# Patient Record
Sex: FEMALE | Race: OTHER | Employment: UNEMPLOYED | URBAN - METROPOLITAN AREA
[De-identification: names, ages, dates, MRNs, and addresses within clinical notes are randomized per-mention and may not be internally consistent; named-entity substitution may affect disease eponyms.]

---

## 2018-08-02 ENCOUNTER — APPOINTMENT (OUTPATIENT)
Dept: CT IMAGING | Age: 53
End: 2018-08-02
Payer: MEDICARE

## 2018-08-02 ENCOUNTER — HOSPITAL ENCOUNTER (EMERGENCY)
Age: 53
Discharge: HOME | End: 2018-08-02
Attending: EMERGENCY MEDICINE
Payer: MEDICARE

## 2018-08-02 VITALS
SYSTOLIC BLOOD PRESSURE: 105 MMHG | BODY MASS INDEX: 27.6 KG/M2 | HEIGHT: 62 IN | DIASTOLIC BLOOD PRESSURE: 65 MMHG | RESPIRATION RATE: 20 BRPM | OXYGEN SATURATION: 100 % | WEIGHT: 150 LBS | HEART RATE: 73 BPM | TEMPERATURE: 98.2 F

## 2018-08-02 DIAGNOSIS — S09.90XA INJURY OF HEAD, INITIAL ENCOUNTER: Primary | ICD-10-CM

## 2018-08-02 DIAGNOSIS — S01.511A LIP LACERATION, INITIAL ENCOUNTER: ICD-10-CM

## 2018-08-02 PROCEDURE — 72125 CT NECK SPINE W/O DYE: CPT

## 2018-08-02 PROCEDURE — 12011 RPR F/E/E/N/L/M 2.5 CM/<: CPT

## 2018-08-02 PROCEDURE — 6370000000 HC RX 637 (ALT 250 FOR IP): Performed by: EMERGENCY MEDICINE

## 2018-08-02 PROCEDURE — 70450 CT HEAD/BRAIN W/O DYE: CPT

## 2018-08-02 PROCEDURE — 99284 EMERGENCY DEPT VISIT MOD MDM: CPT

## 2018-08-02 RX ORDER — LIDOCAINE AND PRILOCAINE 25; 25 MG/G; MG/G
CREAM TOPICAL ONCE
Status: COMPLETED | OUTPATIENT
Start: 2018-08-02 | End: 2018-08-02

## 2018-08-02 RX ADMIN — LIDOCAINE AND PRILOCAINE: 25; 25 CREAM TOPICAL at 16:30

## 2018-08-02 ASSESSMENT — ENCOUNTER SYMPTOMS
SHORTNESS OF BREATH: 0
ABDOMINAL PAIN: 0
EYE PAIN: 0
CHEST TIGHTNESS: 0
NAUSEA: 0
SORE THROAT: 0
VOMITING: 0

## 2018-08-02 ASSESSMENT — PAIN DESCRIPTION - PAIN TYPE: TYPE: ACUTE PAIN

## 2018-08-02 ASSESSMENT — PAIN DESCRIPTION - LOCATION: LOCATION: MOUTH;NECK

## 2018-08-02 ASSESSMENT — PAIN SCALES - GENERAL: PAINLEVEL_OUTOF10: 7

## 2018-08-02 NOTE — ED PROVIDER NOTES
[facility] ED  eMERGENCY dEPARTMENT eNCOUnter      Pt Name: Bernadine Jay  MRN: [id number]  [place] 1965  Date of evaluation: 8/2/2018  Provider: Quinn Bullard DO    CHIEF COMPLAINT       Chief Complaint   Patient presents with    Fall     Pt was at workshop when she states she tripped while walking. Pt states she hit her but denies LOC. Laceration noted to top lip. Pt also complaining of neck. HISTORY OF PRESENT ILLNESS   (Location/Symptom, Timing/Onset, Context/Setting, Quality, Duration, Modifying Factors, Severity)  Note limiting factors. Bernadine Jay is a 46 y.o. female who presents to the emergency department . Patient presents from the workshop. She has history of mental retardation and seizure disorder but states that she did not have a seizure. She tripped over her own feet and fell hitting her face. She has a laceration on her top lip. She does have a headache and neck ache. She was not knocked out. She did not injure anything else     HPI    Nursing Notes were reviewed. REVIEW OF SYSTEMS    (2-9 systems for level 4, 10 or more for level 5)     Review of Systems   Constitutional: Negative for activity change, appetite change and fatigue. HENT: Negative for congestion and sore throat. Eyes: Negative for pain and visual disturbance. Respiratory: Negative for chest tightness and shortness of breath. Cardiovascular: Negative for chest pain. Gastrointestinal: Negative for abdominal pain, nausea and vomiting. Endocrine: Negative for polydipsia. Genitourinary: Negative for flank pain and urgency. Musculoskeletal: Positive for neck pain. Negative for gait problem and neck stiffness. Skin: Positive for wound. Negative for rash. Neurological: Positive for headaches. Negative for dizziness, tremors, seizures, syncope, facial asymmetry, speech difficulty, weakness, light-headedness and numbness.    Psychiatric/Behavioral: Negative for confusion and distress. HENT:   Head: Normocephalic and atraumatic. Right Ear: External ear normal.   Left Ear: External ear normal.   Mouth/Throat: No oropharyngeal exudate. 2 cm open wound upper lip on the mucosa. No active bleeding   Eyes: Conjunctivae are normal. Pupils are equal, round, and reactive to light. Neck: Normal range of motion. Neck supple. No JVD present. No tracheal deviation present. No thyromegaly present. Cardiovascular: Normal rate and normal heart sounds. No murmur heard. Pulmonary/Chest: Effort normal and breath sounds normal. No respiratory distress. She has no wheezes. Abdominal: Soft. Bowel sounds are normal. There is no tenderness. There is no guarding. Musculoskeletal: Normal range of motion. She exhibits no edema. Neck is tender to palpation over the spinous processes. She is in a hard cervical collar   Neurological: She is alert and oriented to person, place, and time. No cranial nerve deficit. Skin: Skin is warm and dry. No rash noted. She is not diaphoretic. Psychiatric: She has a normal mood and affect. Her behavior is normal.       DIAGNOSTIC RESULTS     EKG: All EKG's are interpreted by the Emergency Department Physician who either signs or Co-signs this chart in the absence of a cardiologist.        RADIOLOGY:   Non-plain film images such as CT, Ultrasound and MRI are read by the radiologist. Plain radiographic images are visualized and preliminarily interpreted by the emergency physician with the below findings:    CT of the cervical spine is negative for fracture.   CT of the brain is negative for bleeding    Interpretation per the Radiologist below, if available at the time of this note:    CT Head WO Contrast    (Results Pending)   CT CERVICAL SPINE WO CONTRAST    (Results Pending)         ED BEDSIDE ULTRASOUND:   Performed by ED Physician - none    LABS:  Labs Reviewed - No data to display    All other labs were within normal range or not returned as of this

## 2018-08-02 NOTE — ED NOTES
Pt and sister provided with discharge instructions, also educated verbally. Pt and sister both verbalize understanding & deny any questions.      Marisela Carlson RN  08/02/18 5548

## 2018-08-05 ENCOUNTER — HOSPITAL ENCOUNTER (EMERGENCY)
Age: 53
Discharge: HOME | End: 2018-08-05
Attending: FAMILY MEDICINE
Payer: MEDICARE

## 2018-08-05 ENCOUNTER — APPOINTMENT (OUTPATIENT)
Dept: CT IMAGING | Age: 53
End: 2018-08-05
Payer: MEDICARE

## 2018-08-05 VITALS
DIASTOLIC BLOOD PRESSURE: 67 MMHG | TEMPERATURE: 98.2 F | HEART RATE: 69 BPM | WEIGHT: 150 LBS | HEIGHT: 62 IN | OXYGEN SATURATION: 98 % | BODY MASS INDEX: 27.6 KG/M2 | RESPIRATION RATE: 16 BRPM | SYSTOLIC BLOOD PRESSURE: 110 MMHG

## 2018-08-05 DIAGNOSIS — R41.0 CONFUSION: Primary | ICD-10-CM
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
## 2018-08-05 LAB
ALBUMIN SERPL-MCNC: 4 G/DL (ref 3.9–4.9)
ALP BLD-CCNC: 104 U/L (ref 40–130)
ALT SERPL-CCNC: 12 U/L (ref 0–33)
ANION GAP SERPL CALCULATED.3IONS-SCNC: 12 MEQ/L (ref 7–13)
AST SERPL-CCNC: 12 U/L (ref 0–35)
BACTERIA: NORMAL /HPF
BASOPHILS ABSOLUTE: 0 K/UL (ref 0–0.2)
BASOPHILS RELATIVE PERCENT: 0.9 %
BILIRUB SERPL-MCNC: <0.2 MG/DL (ref 0–1.2)
BILIRUBIN URINE: NEGATIVE
BLOOD, URINE: NEGATIVE
BUN BLDV-MCNC: 9 MG/DL (ref 6–20)
CALCIUM SERPL-MCNC: 8.7 MG/DL (ref 8.6–10.2)
CHLORIDE BLD-SCNC: 104 MEQ/L (ref 98–107)
CLARITY: CLEAR
CO2: 22 MEQ/L (ref 22–29)
COLOR: ABNORMAL
CREAT SERPL-MCNC: 0.67 MG/DL (ref 0.5–0.9)
EOSINOPHILS ABSOLUTE: 0 K/UL (ref 0–0.7)
EOSINOPHILS RELATIVE PERCENT: 1 %
EPITHELIAL CELLS, UA: NORMAL /HPF
GFR AFRICAN AMERICAN: >60
GFR NON-AFRICAN AMERICAN: >60
GLOBULIN: 2.7 G/DL (ref 2.3–3.5)
GLUCOSE BLD-MCNC: 90 MG/DL (ref 74–109)
GLUCOSE URINE: NEGATIVE MG/DL
HCT VFR BLD CALC: 33 % (ref 37–47)
HEMOGLOBIN: 11.1 G/DL (ref 12–16)
KETONES, URINE: NEGATIVE MG/DL
LEUKOCYTE ESTERASE, URINE: ABNORMAL
LYMPHOCYTES ABSOLUTE: 1.1 K/UL (ref 1–4.8)
LYMPHOCYTES RELATIVE PERCENT: 28.4 %
MCH RBC QN AUTO: 32.6 PG (ref 27–31.3)
MCHC RBC AUTO-ENTMCNC: 33.6 % (ref 33–37)
MCV RBC AUTO: 96.8 FL (ref 82–100)
MONOCYTES ABSOLUTE: 0.3 K/UL (ref 0.2–0.8)
MONOCYTES RELATIVE PERCENT: 8 %
NEUTROPHILS ABSOLUTE: 2.4 K/UL (ref 1.4–6.5)
NEUTROPHILS RELATIVE PERCENT: 61.7 %
NITRITE, URINE: NEGATIVE
PDW BLD-RTO: 13.5 % (ref 11.5–14.5)
PH UA: 6.5 (ref 5–9)
PLATELET # BLD: 234 K/UL (ref 130–400)
POTASSIUM REFLEX MAGNESIUM: 3.7 MEQ/L (ref 3.5–5.1)
PROTEIN UA: NEGATIVE MG/DL
RBC # BLD: 3.41 M/UL (ref 4.2–5.4)
RBC UA: NORMAL /HPF (ref 0–2)
SLIDE REVIEW: ABNORMAL
SODIUM BLD-SCNC: 138 MEQ/L (ref 132–144)
SPECIFIC GRAVITY UA: 1.02 (ref 1–1.03)
TOTAL PROTEIN: 6.7 G/DL (ref 6.4–8.1)
UROBILINOGEN, URINE: 0.2 E.U./DL
WBC # BLD: 3.9 K/UL (ref 4.8–10.8)
WBC UA: NORMAL /HPF (ref 0–5)

## 2018-08-05 PROCEDURE — 81001 URINALYSIS AUTO W/SCOPE: CPT

## 2018-08-05 PROCEDURE — 36415 COLL VENOUS BLD VENIPUNCTURE: CPT

## 2018-08-05 PROCEDURE — 85025 COMPLETE CBC W/AUTO DIFF WBC: CPT

## 2018-08-05 PROCEDURE — 70450 CT HEAD/BRAIN W/O DYE: CPT

## 2018-08-05 PROCEDURE — 87086 URINE CULTURE/COLONY COUNT: CPT

## 2018-08-05 PROCEDURE — 80053 COMPREHEN METABOLIC PANEL: CPT

## 2018-08-05 PROCEDURE — 99285 EMERGENCY DEPT VISIT HI MDM: CPT

## 2018-08-05 ASSESSMENT — ENCOUNTER SYMPTOMS
RESPIRATORY NEGATIVE: 1
GASTROINTESTINAL NEGATIVE: 1
EYES NEGATIVE: 1
ALLERGIC/IMMUNOLOGIC NEGATIVE: 1

## 2018-08-05 NOTE — ED PROVIDER NOTES
Past Medical History:   Diagnosis Date    Mental retardation, mild (I.Q. 50-70)     Seizures ([address]          SURGICAL HISTORY       Past Surgical History:   Procedure Laterality Date    ORTHOPEDIC SURGERY Bilateral     ankle    TUBAL LIGATION           CURRENT MEDICATIONS       Previous Medications    ACETAMINOPHEN (TYLENOL) 325 MG TABLET    Take 2 tablets by mouth every 6 hours as needed for Pain or Fever    CARBAMAZEPINE (TEGRETOL XR) 400 MG EXTENDED RELEASE TABLET    Take 400 mg by mouth 2 times daily    CLOBAZAM (ONFI PO)    Take 10 mg by mouth every 12 hours    LIDOCAINE (LIDODERM) 5 %    Place 2 patches onto the skin daily 12 hours on, 12 hours off. MELOXICAM (MOBIC) 7.5 MG TABLET    Take 1 tablet by mouth 2 times daily (with meals)    RISPERIDONE (RISPERDAL) 3 MG TABLET    Take 3 mg by mouth 2 times daily    TOPIRAMATE (TOPAMAX) 200 MG TABLET    Take 200 mg by mouth 2 times daily       ALLERGIES     Patient has no known allergies. FAMILY HISTORY     History reviewed. No pertinent family history. SOCIAL HISTORY       Social History     Social History    Marital status: Single     Spouse name: N/A    Number of children: N/A    Years of education: N/A     Social History Main Topics    Smoking status: Never Smoker    Smokeless tobacco: Never Used    Alcohol use No    Drug use: No    Sexual activity: Not Asked     Other Topics Concern    None     Social History Narrative    None       SCREENINGS             PHYSICAL EXAM    (up to 7 for level 4, 8 or more for level 5)     ED Triage Vitals [08/05/18 1552]   BP Temp Temp Source Pulse Resp SpO2 Height Weight   110/67 98.2 °F (36.8 °C) Oral 69 16 98 % 5' 2\" (1.575 m) 150 lb (68 kg)       Physical Exam   Constitutional: She is oriented to person, place, and time. She appears well-developed and well-nourished. HENT:   Head: Normocephalic and atraumatic.    Right Ear: External ear normal.   Left Ear: External ear normal.   Nose: Nose normal. Mouth/Throat: Oropharynx is clear and moist.   Eyes: Conjunctivae and EOM are normal. Pupils are equal, round, and reactive to light. Neck: Normal range of motion. Neck supple. Cardiovascular: Normal rate, regular rhythm, normal heart sounds and intact distal pulses. Pulmonary/Chest: Breath sounds normal. No respiratory distress. She has no wheezes. She has no rales. She exhibits no tenderness. Abdominal: Soft. Bowel sounds are normal.   Musculoskeletal: Normal range of motion. Neurological: She is alert and oriented to person, place, and time. She has normal reflexes. She displays normal reflexes. No cranial nerve deficit. She exhibits normal muscle tone. Coordination normal.   Skin: Skin is warm and dry. Psychiatric: She has a normal mood and affect. Her behavior is normal. Judgment and thought content normal.   Nursing note and vitals reviewed. DIAGNOSTIC RESULTS     EKG: All EKG's are interpreted by the Emergency Department Physician who either signs or Co-signs this chart in the absence of a cardiologist.        RADIOLOGY:   Non-plain film images such as CT, Ultrasound and MRI are read by the radiologist. Plain radiographic images are visualized and preliminarily interpreted by the emergency physician with the below findings:        Interpretation per the Radiologist below, if available at the time of this note:    CT head without contrast   Final Result      NO ACUTE INTRACRANIAL PROCESS NOR CHANGE FROM LAST EXAM.      MILD MUCOSAL THICKENING ETHMOID SINUSES. All CT scans at this facility use dose modulation, iterative reconstruction, and/or weight based dosing when appropriate to reduce radiation dose to as low as reasonably achievable.                ED BEDSIDE ULTRASOUND:   Performed by ED Physician - none    LABS:  Labs Reviewed   CBC WITH AUTO DIFFERENTIAL - Abnormal; Notable for the following:        Result Value    WBC 3.9 (*)     RBC 3.41 (*)     Hemoglobin 11.1 (*)

## 2018-08-07 LAB — URINE CULTURE, ROUTINE: NORMAL

## 2019-01-11 ENCOUNTER — APPOINTMENT (OUTPATIENT)
Dept: CT IMAGING | Age: 54
End: 2019-01-11
Payer: MEDICARE

## 2019-01-11 ENCOUNTER — APPOINTMENT (OUTPATIENT)
Dept: GENERAL RADIOLOGY | Age: 54
End: 2019-01-11
Payer: MEDICARE

## 2019-01-11 ENCOUNTER — HOSPITAL ENCOUNTER (OUTPATIENT)
Age: 54
Setting detail: OBSERVATION
Discharge: HOME | End: 2019-01-14
Attending: INTERNAL MEDICINE | Admitting: INTERNAL MEDICINE
Payer: MEDICARE

## 2019-01-11 ENCOUNTER — APPOINTMENT (OUTPATIENT)
Dept: MRI IMAGING | Age: 54
End: 2019-01-11
Payer: MEDICARE

## 2019-01-11 ENCOUNTER — APPOINTMENT (OUTPATIENT)
Dept: ULTRASOUND IMAGING | Age: 54
End: 2019-01-11
Payer: MEDICARE

## 2019-01-11 DIAGNOSIS — R47.81 SLURRED SPEECH: ICD-10-CM

## 2019-01-11 DIAGNOSIS — G45.9 TIA (TRANSIENT ISCHEMIC ATTACK): Primary | ICD-10-CM

## 2019-01-11 PROBLEM — R29.90 STROKE-LIKE SYMPTOM: Status: ACTIVE | Noted: 2019-01-11

## 2019-01-11 LAB
ALBUMIN SERPL-MCNC: 4.2 G/DL (ref 3.9–4.9)
ALP BLD-CCNC: 128 U/L (ref 40–130)
ALT SERPL-CCNC: 10 U/L (ref 0–33)
ANION GAP SERPL CALCULATED.3IONS-SCNC: 12 MEQ/L (ref 7–13)
APTT: 25.8 SEC (ref 21.6–35.4)
AST SERPL-CCNC: 12 U/L (ref 0–35)
BASOPHILS ABSOLUTE: 0 K/UL (ref 0–0.2)
BASOPHILS RELATIVE PERCENT: 1 %
BILIRUB SERPL-MCNC: <0.2 MG/DL (ref 0–1.2)
BILIRUBIN URINE: NEGATIVE
BLOOD, URINE: NEGATIVE
BUN BLDV-MCNC: 9 MG/DL (ref 6–20)
CALCIUM SERPL-MCNC: 8.4 MG/DL (ref 8.6–10.2)
CARBAMAZEPINE LEVEL: 14.7 UG/ML (ref 4–10)
CHLORIDE BLD-SCNC: 101 MEQ/L (ref 98–107)
CLARITY: ABNORMAL
CO2: 22 MEQ/L (ref 22–29)
COLOR: ABNORMAL
CREAT SERPL-MCNC: 0.66 MG/DL (ref 0.5–0.9)
EKG ATRIAL RATE: 54 BPM
EKG P AXIS: 49 DEGREES
EKG P-R INTERVAL: 178 MS
EKG Q-T INTERVAL: 426 MS
EKG QRS DURATION: 104 MS
EKG QTC CALCULATION (BAZETT): 403 MS
EKG R AXIS: 68 DEGREES
EKG T AXIS: 46 DEGREES
EKG VENTRICULAR RATE: 54 BPM
EOSINOPHILS ABSOLUTE: 0.1 K/UL (ref 0–0.7)
EOSINOPHILS RELATIVE PERCENT: 1.9 %
FOLATE: 8.2 NG/ML (ref 7.3–26.1)
GFR AFRICAN AMERICAN: >60
GFR NON-AFRICAN AMERICAN: >60
GLOBULIN: 3 G/DL (ref 2.3–3.5)
GLUCOSE BLD-MCNC: 97 MG/DL (ref 74–109)
GLUCOSE URINE: NEGATIVE MG/DL
HCT VFR BLD CALC: 36.4 % (ref 37–47)
HEMOGLOBIN: 12.5 G/DL (ref 12–16)
INR BLD: 1
KETONES, URINE: NEGATIVE MG/DL
LEUKOCYTE ESTERASE, URINE: NEGATIVE
LYMPHOCYTES ABSOLUTE: 1 K/UL (ref 1–4.8)
LYMPHOCYTES RELATIVE PERCENT: 31.6 %
MCH RBC QN AUTO: 33.1 PG (ref 27–31.3)
MCHC RBC AUTO-ENTMCNC: 34.3 % (ref 33–37)
MCV RBC AUTO: 96.7 FL (ref 82–100)
MONOCYTES ABSOLUTE: 0.3 K/UL (ref 0.2–0.8)
MONOCYTES RELATIVE PERCENT: 9.3 %
NEUTROPHILS ABSOLUTE: 1.8 K/UL (ref 1.4–6.5)
NEUTROPHILS RELATIVE PERCENT: 56.2 %
NITRITE, URINE: NEGATIVE
PDW BLD-RTO: 13.8 % (ref 11.5–14.5)
PH UA: 8 (ref 5–9)
PLATELET # BLD: 253 K/UL (ref 130–400)
POTASSIUM SERPL-SCNC: 3.7 MEQ/L (ref 3.5–5.1)
PROTEIN UA: NEGATIVE MG/DL
PROTHROMBIN TIME: 10.2 SEC (ref 9–11.5)
RBC # BLD: 3.77 M/UL (ref 4.2–5.4)
SLIDE REVIEW: ABNORMAL
SODIUM BLD-SCNC: 135 MEQ/L (ref 132–144)
SPECIFIC GRAVITY UA: 1.02 (ref 1–1.03)
TOTAL CK: 80 U/L (ref 0–170)
TOTAL PROTEIN: 7.2 G/DL (ref 6.4–8.1)
TROPONIN: <0.01 NG/ML (ref 0–0.01)
TSH SERPL DL<=0.05 MIU/L-ACNC: 6.58 UIU/ML (ref 0.27–4.2)
URINE REFLEX TO CULTURE: ABNORMAL
UROBILINOGEN, URINE: 0.2 E.U./DL
VITAMIN D 25-HYDROXY: 9.2 NG/ML (ref 30–100)
WBC # BLD: 3.1 K/UL (ref 4.8–10.8)

## 2019-01-11 PROCEDURE — G0378 HOSPITAL OBSERVATION PER HR: HCPCS

## 2019-01-11 PROCEDURE — 80053 COMPREHEN METABOLIC PANEL: CPT

## 2019-01-11 PROCEDURE — 84443 ASSAY THYROID STIM HORMONE: CPT

## 2019-01-11 PROCEDURE — 96372 THER/PROPH/DIAG INJ SC/IM: CPT

## 2019-01-11 PROCEDURE — 71045 X-RAY EXAM CHEST 1 VIEW: CPT

## 2019-01-11 PROCEDURE — 80156 ASSAY CARBAMAZEPINE TOTAL: CPT

## 2019-01-11 PROCEDURE — 70544 MR ANGIOGRAPHY HEAD W/O DYE: CPT

## 2019-01-11 PROCEDURE — 84484 ASSAY OF TROPONIN QUANT: CPT

## 2019-01-11 PROCEDURE — 99285 EMERGENCY DEPT VISIT HI MDM: CPT

## 2019-01-11 PROCEDURE — 85025 COMPLETE CBC W/AUTO DIFF WBC: CPT

## 2019-01-11 PROCEDURE — 6370000000 HC RX 637 (ALT 250 FOR IP): Performed by: PHYSICIAN ASSISTANT

## 2019-01-11 PROCEDURE — 6360000002 HC RX W HCPCS: Performed by: NURSE PRACTITIONER

## 2019-01-11 PROCEDURE — 81003 URINALYSIS AUTO W/O SCOPE: CPT

## 2019-01-11 PROCEDURE — 85730 THROMBOPLASTIN TIME PARTIAL: CPT

## 2019-01-11 PROCEDURE — 82746 ASSAY OF FOLIC ACID SERUM: CPT

## 2019-01-11 PROCEDURE — 70551 MRI BRAIN STEM W/O DYE: CPT

## 2019-01-11 PROCEDURE — 93880 EXTRACRANIAL BILAT STUDY: CPT

## 2019-01-11 PROCEDURE — 70450 CT HEAD/BRAIN W/O DYE: CPT

## 2019-01-11 PROCEDURE — 85610 PROTHROMBIN TIME: CPT

## 2019-01-11 PROCEDURE — 82550 ASSAY OF CK (CPK): CPT

## 2019-01-11 PROCEDURE — 36415 COLL VENOUS BLD VENIPUNCTURE: CPT

## 2019-01-11 PROCEDURE — 93010 ELECTROCARDIOGRAM REPORT: CPT | Performed by: INTERNAL MEDICINE

## 2019-01-11 PROCEDURE — 93005 ELECTROCARDIOGRAM TRACING: CPT

## 2019-01-11 PROCEDURE — 82306 VITAMIN D 25 HYDROXY: CPT

## 2019-01-11 PROCEDURE — 6370000000 HC RX 637 (ALT 250 FOR IP): Performed by: INTERNAL MEDICINE

## 2019-01-11 RX ORDER — CLOBAZAM 10 MG/1
10 TABLET ORAL EVERY 12 HOURS
Status: DISCONTINUED | OUTPATIENT
Start: 2019-01-11 | End: 2019-01-14 | Stop reason: HOSPADM

## 2019-01-11 RX ORDER — SIMVASTATIN 20 MG
20 TABLET ORAL NIGHTLY
Status: DISCONTINUED | OUTPATIENT
Start: 2019-01-11 | End: 2019-01-11

## 2019-01-11 RX ORDER — LORAZEPAM 2 MG/ML
2 INJECTION INTRAMUSCULAR EVERY 10 MIN PRN
Status: DISCONTINUED | OUTPATIENT
Start: 2019-01-11 | End: 2019-01-14 | Stop reason: HOSPADM

## 2019-01-11 RX ORDER — SODIUM CHLORIDE 0.9 % (FLUSH) 0.9 %
10 SYRINGE (ML) INJECTION PRN
Status: DISCONTINUED | OUTPATIENT
Start: 2019-01-11 | End: 2019-01-14 | Stop reason: HOSPADM

## 2019-01-11 RX ORDER — ASPIRIN 81 MG/1
324 TABLET, CHEWABLE ORAL ONCE
Status: COMPLETED | OUTPATIENT
Start: 2019-01-11 | End: 2019-01-11

## 2019-01-11 RX ORDER — ASPIRIN 81 MG/1
81 TABLET ORAL DAILY
Status: DISCONTINUED | OUTPATIENT
Start: 2019-01-11 | End: 2019-01-14 | Stop reason: HOSPADM

## 2019-01-11 RX ORDER — RISPERIDONE 3 MG/1
3 TABLET, FILM COATED ORAL 2 TIMES DAILY
Status: DISCONTINUED | OUTPATIENT
Start: 2019-01-11 | End: 2019-01-14 | Stop reason: HOSPADM

## 2019-01-11 RX ORDER — CARBAMAZEPINE 100 MG/1
400 TABLET, EXTENDED RELEASE ORAL 2 TIMES DAILY
Status: DISCONTINUED | OUTPATIENT
Start: 2019-01-11 | End: 2019-01-13

## 2019-01-11 RX ORDER — SODIUM CHLORIDE 0.9 % (FLUSH) 0.9 %
10 SYRINGE (ML) INJECTION EVERY 12 HOURS SCHEDULED
Status: DISCONTINUED | OUTPATIENT
Start: 2019-01-11 | End: 2019-01-14 | Stop reason: HOSPADM

## 2019-01-11 RX ORDER — ACETAMINOPHEN 325 MG/1
650 TABLET ORAL EVERY 6 HOURS PRN
Status: DISCONTINUED | OUTPATIENT
Start: 2019-01-11 | End: 2019-01-14 | Stop reason: HOSPADM

## 2019-01-11 RX ORDER — ONDANSETRON 2 MG/ML
4 INJECTION INTRAMUSCULAR; INTRAVENOUS EVERY 6 HOURS PRN
Status: DISCONTINUED | OUTPATIENT
Start: 2019-01-11 | End: 2019-01-14 | Stop reason: HOSPADM

## 2019-01-11 RX ADMIN — ASPIRIN 81 MG 324 MG: 81 TABLET ORAL at 09:25

## 2019-01-11 RX ADMIN — CLOBAZAM 10 MG: 10 TABLET ORAL at 22:35

## 2019-01-11 RX ADMIN — RISPERIDONE 3 MG: 3 TABLET ORAL at 15:12

## 2019-01-11 RX ADMIN — RISPERIDONE 3 MG: 3 TABLET ORAL at 22:35

## 2019-01-11 RX ADMIN — ENOXAPARIN SODIUM 40 MG: 40 INJECTION SUBCUTANEOUS at 15:14

## 2019-01-11 RX ADMIN — TOPIRAMATE 200 MG: 200 TABLET, FILM COATED ORAL at 22:35

## 2019-01-11 ASSESSMENT — ENCOUNTER SYMPTOMS
EYE DISCHARGE: 0
SHORTNESS OF BREATH: 0
COLOR CHANGE: 0
ABDOMINAL DISTENTION: 0
CONSTIPATION: 0
ABDOMINAL PAIN: 0
RHINORRHEA: 0
SORE THROAT: 0

## 2019-01-12 LAB
CARBAMAZEPINE LEVEL: 5.7 UG/ML (ref 4–10)
CHOLESTEROL, TOTAL: 262 MG/DL (ref 0–199)
GLUCOSE BLD-MCNC: 107 MG/DL (ref 60–115)
HDLC SERPL-MCNC: 112 MG/DL (ref 40–59)
LDL CHOLESTEROL CALCULATED: 120 MG/DL (ref 0–129)
LV EF: 55 %
LVEF MODALITY: NORMAL
PERFORMED ON: NORMAL
T3 FREE: 2.5 PG/ML (ref 2–4.4)
T4 FREE: 0.55 NG/DL (ref 0.93–1.7)
TRIGL SERPL-MCNC: 152 MG/DL (ref 0–200)

## 2019-01-12 PROCEDURE — 93306 TTE W/DOPPLER COMPLETE: CPT

## 2019-01-12 PROCEDURE — 84439 ASSAY OF FREE THYROXINE: CPT

## 2019-01-12 PROCEDURE — 80061 LIPID PANEL: CPT

## 2019-01-12 PROCEDURE — 6370000000 HC RX 637 (ALT 250 FOR IP): Performed by: NURSE PRACTITIONER

## 2019-01-12 PROCEDURE — 6370000000 HC RX 637 (ALT 250 FOR IP): Performed by: SPECIALIST

## 2019-01-12 PROCEDURE — G0378 HOSPITAL OBSERVATION PER HR: HCPCS

## 2019-01-12 PROCEDURE — G8978 MOBILITY CURRENT STATUS: HCPCS

## 2019-01-12 PROCEDURE — 80156 ASSAY CARBAMAZEPINE TOTAL: CPT

## 2019-01-12 PROCEDURE — 36415 COLL VENOUS BLD VENIPUNCTURE: CPT

## 2019-01-12 PROCEDURE — 84481 FREE ASSAY (FT-3): CPT

## 2019-01-12 PROCEDURE — 96372 THER/PROPH/DIAG INJ SC/IM: CPT

## 2019-01-12 PROCEDURE — 6360000002 HC RX W HCPCS: Performed by: NURSE PRACTITIONER

## 2019-01-12 PROCEDURE — 97162 PT EVAL MOD COMPLEX 30 MIN: CPT

## 2019-01-12 PROCEDURE — G8979 MOBILITY GOAL STATUS: HCPCS

## 2019-01-12 PROCEDURE — 2580000003 HC RX 258: Performed by: NURSE PRACTITIONER

## 2019-01-12 PROCEDURE — 6370000000 HC RX 637 (ALT 250 FOR IP): Performed by: INTERNAL MEDICINE

## 2019-01-12 RX ORDER — ASPIRIN 81 MG/1
81 TABLET ORAL DAILY
Qty: 30 TABLET | Refills: 3 | Status: SHIPPED | OUTPATIENT
Start: 2019-01-12 | End: 2019-01-28

## 2019-01-12 RX ORDER — ATORVASTATIN CALCIUM 40 MG/1
40 TABLET, FILM COATED ORAL NIGHTLY
Status: DISCONTINUED | OUTPATIENT
Start: 2019-01-12 | End: 2019-01-14 | Stop reason: HOSPADM

## 2019-01-12 RX ORDER — ATORVASTATIN CALCIUM 40 MG/1
40 TABLET, FILM COATED ORAL NIGHTLY
Qty: 30 TABLET | Refills: 3 | Status: SHIPPED | OUTPATIENT
Start: 2019-01-12 | End: 2019-01-28

## 2019-01-12 RX ADMIN — Medication 10 ML: at 22:11

## 2019-01-12 RX ADMIN — Medication 10 ML: at 10:18

## 2019-01-12 RX ADMIN — CLOBAZAM 10 MG: 10 TABLET ORAL at 14:48

## 2019-01-12 RX ADMIN — RISPERIDONE 3 MG: 3 TABLET ORAL at 22:11

## 2019-01-12 RX ADMIN — ACETAMINOPHEN 650 MG: 325 TABLET ORAL at 23:38

## 2019-01-12 RX ADMIN — TOPIRAMATE 200 MG: 200 TABLET, FILM COATED ORAL at 22:11

## 2019-01-12 RX ADMIN — RISPERIDONE 3 MG: 3 TABLET ORAL at 10:17

## 2019-01-12 RX ADMIN — ENOXAPARIN SODIUM 40 MG: 40 INJECTION SUBCUTANEOUS at 10:18

## 2019-01-12 RX ADMIN — ATORVASTATIN CALCIUM 40 MG: 40 TABLET, FILM COATED ORAL at 22:11

## 2019-01-12 RX ADMIN — Medication 5000 UNITS: at 22:11

## 2019-01-12 RX ADMIN — ASPIRIN 81 MG: 81 TABLET, COATED ORAL at 10:17

## 2019-01-12 RX ADMIN — TOPIRAMATE 200 MG: 200 TABLET, FILM COATED ORAL at 10:17

## 2019-01-12 ASSESSMENT — PAIN SCALES - GENERAL
PAINLEVEL_OUTOF10: 5
PAINLEVEL_OUTOF10: 0

## 2019-01-13 PROCEDURE — 2580000003 HC RX 258: Performed by: NURSE PRACTITIONER

## 2019-01-13 PROCEDURE — G0378 HOSPITAL OBSERVATION PER HR: HCPCS

## 2019-01-13 PROCEDURE — 90686 IIV4 VACC NO PRSV 0.5 ML IM: CPT | Performed by: INTERNAL MEDICINE

## 2019-01-13 PROCEDURE — 6360000002 HC RX W HCPCS: Performed by: NURSE PRACTITIONER

## 2019-01-13 PROCEDURE — 6370000000 HC RX 637 (ALT 250 FOR IP): Performed by: SPECIALIST

## 2019-01-13 PROCEDURE — G0008 ADMIN INFLUENZA VIRUS VAC: HCPCS | Performed by: INTERNAL MEDICINE

## 2019-01-13 PROCEDURE — 6370000000 HC RX 637 (ALT 250 FOR IP): Performed by: INTERNAL MEDICINE

## 2019-01-13 PROCEDURE — 96372 THER/PROPH/DIAG INJ SC/IM: CPT

## 2019-01-13 PROCEDURE — 6360000002 HC RX W HCPCS: Performed by: INTERNAL MEDICINE

## 2019-01-13 PROCEDURE — 6370000000 HC RX 637 (ALT 250 FOR IP): Performed by: NURSE PRACTITIONER

## 2019-01-13 RX ADMIN — ENOXAPARIN SODIUM 40 MG: 40 INJECTION SUBCUTANEOUS at 08:50

## 2019-01-13 RX ADMIN — ACETAMINOPHEN 650 MG: 325 TABLET ORAL at 05:04

## 2019-01-13 RX ADMIN — RISPERIDONE 3 MG: 3 TABLET ORAL at 20:56

## 2019-01-13 RX ADMIN — ATORVASTATIN CALCIUM 40 MG: 40 TABLET, FILM COATED ORAL at 20:56

## 2019-01-13 RX ADMIN — CLOBAZAM 10 MG: 10 TABLET ORAL at 21:02

## 2019-01-13 RX ADMIN — Medication 10 ML: at 08:52

## 2019-01-13 RX ADMIN — TOPIRAMATE 200 MG: 200 TABLET, FILM COATED ORAL at 20:56

## 2019-01-13 RX ADMIN — TOPIRAMATE 200 MG: 200 TABLET, FILM COATED ORAL at 08:50

## 2019-01-13 RX ADMIN — RISPERIDONE 3 MG: 3 TABLET ORAL at 08:50

## 2019-01-13 RX ADMIN — ACETAMINOPHEN 650 MG: 325 TABLET ORAL at 20:56

## 2019-01-13 RX ADMIN — Medication 10 ML: at 20:58

## 2019-01-13 RX ADMIN — ACETAMINOPHEN 650 MG: 325 TABLET ORAL at 13:35

## 2019-01-13 RX ADMIN — CLOBAZAM 10 MG: 10 TABLET ORAL at 05:18

## 2019-01-13 RX ADMIN — Medication 5000 UNITS: at 08:50

## 2019-01-13 RX ADMIN — INFLUENZA A VIRUS A/MICHIGAN/45/2015 X-275 (H1N1) ANTIGEN (FORMALDEHYDE INACTIVATED), INFLUENZA A VIRUS A/SINGAPORE/INFIMH-16-0019/2016 IVR-186 (H3N2) ANTIGEN (FORMALDEHYDE INACTIVATED), INFLUENZA B VIRUS B/PHUKET/3073/2013 ANTIGEN (FORMALDEHYDE INACTIVATED), AND INFLUENZA B VIRUS B/MARYLAND/15/2016 BX-69A ANTIGEN (FORMALDEHYDE INACTIVATED) 0.5 ML: 15; 15; 15; 15 INJECTION, SUSPENSION INTRAMUSCULAR at 08:50

## 2019-01-13 RX ADMIN — ASPIRIN 81 MG: 81 TABLET, COATED ORAL at 08:50

## 2019-01-13 ASSESSMENT — PAIN DESCRIPTION - LOCATION: LOCATION: HEAD

## 2019-01-13 ASSESSMENT — PAIN SCALES - GENERAL
PAINLEVEL_OUTOF10: 10
PAINLEVEL_OUTOF10: 6
PAINLEVEL_OUTOF10: 8

## 2019-01-14 VITALS
WEIGHT: 152 LBS | OXYGEN SATURATION: 97 % | HEART RATE: 88 BPM | BODY MASS INDEX: 27.97 KG/M2 | RESPIRATION RATE: 18 BRPM | SYSTOLIC BLOOD PRESSURE: 133 MMHG | TEMPERATURE: 99.1 F | HEIGHT: 62 IN | DIASTOLIC BLOOD PRESSURE: 73 MMHG

## 2019-01-14 PROCEDURE — 6370000000 HC RX 637 (ALT 250 FOR IP): Performed by: INTERNAL MEDICINE

## 2019-01-14 PROCEDURE — G0378 HOSPITAL OBSERVATION PER HR: HCPCS

## 2019-01-14 PROCEDURE — 6360000002 HC RX W HCPCS: Performed by: NURSE PRACTITIONER

## 2019-01-14 PROCEDURE — 97116 GAIT TRAINING THERAPY: CPT

## 2019-01-14 PROCEDURE — 96372 THER/PROPH/DIAG INJ SC/IM: CPT

## 2019-01-14 PROCEDURE — 6370000000 HC RX 637 (ALT 250 FOR IP): Performed by: SPECIALIST

## 2019-01-14 PROCEDURE — 6370000000 HC RX 637 (ALT 250 FOR IP): Performed by: NURSE PRACTITIONER

## 2019-01-14 RX ADMIN — ASPIRIN 81 MG: 81 TABLET, COATED ORAL at 08:12

## 2019-01-14 RX ADMIN — ACETAMINOPHEN 650 MG: 325 TABLET ORAL at 04:05

## 2019-01-14 RX ADMIN — Medication 5000 UNITS: at 08:12

## 2019-01-14 RX ADMIN — CLOBAZAM 10 MG: 10 TABLET ORAL at 07:09

## 2019-01-14 RX ADMIN — TOPIRAMATE 200 MG: 200 TABLET, FILM COATED ORAL at 08:12

## 2019-01-14 RX ADMIN — ENOXAPARIN SODIUM 40 MG: 40 INJECTION SUBCUTANEOUS at 08:13

## 2019-01-14 RX ADMIN — ACETAMINOPHEN 650 MG: 325 TABLET ORAL at 12:26

## 2019-01-14 RX ADMIN — RISPERIDONE 3 MG: 3 TABLET ORAL at 08:12

## 2019-01-14 ASSESSMENT — PAIN DESCRIPTION - LOCATION: LOCATION: HEAD

## 2019-01-14 ASSESSMENT — PAIN SCALES - GENERAL
PAINLEVEL_OUTOF10: 5
PAINLEVEL_OUTOF10: 0
PAINLEVEL_OUTOF10: 7
PAINLEVEL_OUTOF10: 6

## 2019-01-28 ENCOUNTER — OFFICE VISIT (OUTPATIENT)
Dept: FAMILY MEDICINE CLINIC | Age: 54
End: 2019-01-28
Payer: MEDICARE

## 2019-01-28 VITALS
TEMPERATURE: 98.1 F | HEART RATE: 77 BPM | OXYGEN SATURATION: 99 % | DIASTOLIC BLOOD PRESSURE: 62 MMHG | BODY MASS INDEX: 27.42 KG/M2 | HEIGHT: 62 IN | WEIGHT: 149 LBS | RESPIRATION RATE: 14 BRPM | SYSTOLIC BLOOD PRESSURE: 118 MMHG

## 2019-01-28 DIAGNOSIS — Z12.11 COLON CANCER SCREENING: ICD-10-CM

## 2019-01-28 DIAGNOSIS — E55.9 VITAMIN D DEFICIENCY: Chronic | ICD-10-CM

## 2019-01-28 DIAGNOSIS — R29.90 STROKE-LIKE SYMPTOM: Primary | ICD-10-CM

## 2019-01-28 DIAGNOSIS — Q07.9 CONGENITAL ENCEPHALOPATHY (HCC): ICD-10-CM

## 2019-01-28 DIAGNOSIS — Z12.39 SCREENING FOR BREAST CANCER: ICD-10-CM

## 2019-01-28 DIAGNOSIS — E03.9 HYPOTHYROIDISM, UNSPECIFIED TYPE: ICD-10-CM

## 2019-01-28 DIAGNOSIS — G40.909 SEIZURE DISORDER (HCC): ICD-10-CM

## 2019-01-28 PROBLEM — R79.89 ABNORMAL THYROID BLOOD TEST: Chronic | Status: ACTIVE | Noted: 2019-01-28

## 2019-01-28 PROCEDURE — 1036F TOBACCO NON-USER: CPT | Performed by: FAMILY MEDICINE

## 2019-01-28 PROCEDURE — 3017F COLORECTAL CA SCREEN DOC REV: CPT | Performed by: FAMILY MEDICINE

## 2019-01-28 PROCEDURE — 99204 OFFICE O/P NEW MOD 45 MIN: CPT | Performed by: FAMILY MEDICINE

## 2019-01-28 PROCEDURE — G8427 DOCREV CUR MEDS BY ELIG CLIN: HCPCS | Performed by: FAMILY MEDICINE

## 2019-01-28 PROCEDURE — G8482 FLU IMMUNIZE ORDER/ADMIN: HCPCS | Performed by: FAMILY MEDICINE

## 2019-01-28 PROCEDURE — G8419 CALC BMI OUT NRM PARAM NOF/U: HCPCS | Performed by: FAMILY MEDICINE

## 2019-01-28 RX ORDER — LANOLIN ALCOHOL/MO/W.PET/CERES
3 CREAM (GRAM) TOPICAL DAILY
COMMUNITY
End: 2019-11-25 | Stop reason: DRUGHIGH

## 2019-01-28 RX ORDER — CLOBAZAM 10 MG/1
TABLET ORAL
Refills: 5 | COMMUNITY
Start: 2018-12-30

## 2019-01-28 ASSESSMENT — PATIENT HEALTH QUESTIONNAIRE - PHQ9
2. FEELING DOWN, DEPRESSED OR HOPELESS: 0
SUM OF ALL RESPONSES TO PHQ QUESTIONS 1-9: 0
SUM OF ALL RESPONSES TO PHQ9 QUESTIONS 1 & 2: 0
SUM OF ALL RESPONSES TO PHQ QUESTIONS 1-9: 0
1. LITTLE INTEREST OR PLEASURE IN DOING THINGS: 0

## 2019-05-03 ENCOUNTER — HOSPITAL ENCOUNTER (EMERGENCY)
Age: 54
Discharge: HOME | End: 2019-05-03
Attending: EMERGENCY MEDICINE
Payer: MEDICARE

## 2019-05-03 ENCOUNTER — APPOINTMENT (OUTPATIENT)
Dept: GENERAL RADIOLOGY | Age: 54
End: 2019-05-03
Payer: MEDICARE

## 2019-05-03 ENCOUNTER — APPOINTMENT (OUTPATIENT)
Dept: CT IMAGING | Age: 54
End: 2019-05-03
Payer: MEDICARE

## 2019-05-03 VITALS
RESPIRATION RATE: 18 BRPM | TEMPERATURE: 98.8 F | DIASTOLIC BLOOD PRESSURE: 55 MMHG | WEIGHT: 150 LBS | SYSTOLIC BLOOD PRESSURE: 107 MMHG | BODY MASS INDEX: 27.44 KG/M2 | HEART RATE: 66 BPM | OXYGEN SATURATION: 100 %
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
## 2019-05-03 DIAGNOSIS — S09.90XA CLOSED HEAD INJURY, INITIAL ENCOUNTER: Primary | ICD-10-CM

## 2019-05-03 PROCEDURE — 72220 X-RAY EXAM SACRUM TAILBONE: CPT

## 2019-05-03 PROCEDURE — 72110 X-RAY EXAM L-2 SPINE 4/>VWS: CPT

## 2019-05-03 PROCEDURE — 99283 EMERGENCY DEPT VISIT LOW MDM: CPT

## 2019-05-03 PROCEDURE — 70450 CT HEAD/BRAIN W/O DYE: CPT

## 2019-05-03 ASSESSMENT — ENCOUNTER SYMPTOMS
DIARRHEA: 0
RHINORRHEA: 0
COUGH: 0
ABDOMINAL PAIN: 0
ABDOMINAL DISTENTION: 0
EYE PAIN: 0
COLOR CHANGE: 0
BACK PAIN: 0
CONSTIPATION: 0
NAUSEA: 0
SHORTNESS OF BREATH: 0
APNEA: 0
SORE THROAT: 0
SINUS PRESSURE: 0
VOMITING: 0
WHEEZING: 0
PHOTOPHOBIA: 0

## 2019-05-03 NOTE — ED NOTES
Patient resting in bed with caregiver at bedside. Patient denies any complaints of pain. Call light in reach. Bed pads intact.      Rene Mckoy RN  05/03/19 0851

## 2019-05-03 NOTE — ED PROVIDER NOTES
[address] [place] [address] ED  eMERGENCY dEPARTMENT eNCOUnter      Pt Name: Patrick Green  MRN: [id number]  [place] 1965  Date of evaluation: 5/3/2019  Provider: Rosa Gutiérrez MD    CHIEF COMPLAINT       Chief Complaint   Patient presents with    Fall     fell at adult  found on floor in restroom (hx seizures)         HISTORY OF PRESENT ILLNESS   (Location/Symptom, Timing/Onset,Context/Setting, Quality, Duration, Modifying Factors, Severity)  Note limiting factors. Patrick Green is a 46 y.o. female who presents to the emergency department with complaint of fall at work in the restroom. 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 Marlise J Luis while coming out of the restroom and landed on her butt. Denies loss of consciousness. Wears protective helmet. May have struck her head. Denies pain or discomfort. HPI    Nursing Notes were reviewed. REVIEW OF SYSTEMS    (2-9 systems for level 4, 10 or more for level 5)     Review of Systems   Constitutional: Negative. Negative for activity change, appetite change, chills, fatigue and fever. HENT: Negative for congestion, ear discharge, ear pain, hearing loss, rhinorrhea, sinus pressure and sore throat. Eyes: Negative for photophobia, pain and visual disturbance. Respiratory: Negative for apnea, cough, shortness of breath and wheezing. Cardiovascular: Negative for chest pain, palpitations and leg swelling. Gastrointestinal: Negative for abdominal distention, abdominal pain, constipation, diarrhea, nausea and vomiting. Endocrine: Negative for cold intolerance, heat intolerance and polyuria. Genitourinary: Negative for dysuria, flank pain, frequency and urgency. Musculoskeletal: Negative for arthralgias, back pain, gait problem, myalgias and neck stiffness. Skin: Negative for color change, pallor and rash. Allergic/Immunologic: Negative for food allergies and immunocompromised state.    Neurological: Negative for History    None   Social Needs    Financial resource strain: None    Food insecurity:     Worry: None     Inability: None    Transportation needs:     Medical: None     Non-medical: None   Tobacco Use    Smoking status: Never Smoker    Smokeless tobacco: Never Used   Substance and Sexual Activity    Alcohol use: No    Drug use: No    Sexual activity: Never   Lifestyle    Physical activity:     Days per week: None     Minutes per session: None    Stress: None   Relationships    Social connections:     Talks on phone: None     Gets together: None     Attends Latter-day service: None     Active member of club or organization: None     Attends meetings of clubs or organizations: None     Relationship status: None    Intimate partner violence:     Fear of current or ex partner: None     Emotionally abused: None     Physically abused: None     Forced sexual activity: None   Other Topics Concern    None   Social History Narrative    None       SCREENINGS   NIH Stroke Scale  Interval: Baseline  Level of Consciousness (1a. ): Alert  LOC Questions (1b. ):  Answers both correctly  LOC Commands (1c. ): Obeys both correctly  Best Gaze (2. ): Normal  Visual (3. ): No visual loss  Facial Palsy (4. ): Normal  Motor Arm, Left (5a. ): No drift  Motor Arm, Right (5b. ): No drift  Motor Leg, Left (6a. ): No drift  Motor Leg, Right (6b. ): No drift  Limb Ataxia (7. ): Absent  Sensory (8. ): Normal  Best Language (9. ): No aphasia  Dysarthria (10. ): Normal  Extinction and Inattention (11): No neglect  Total: 0Glasgow Coma Scale  Eye Opening: Spontaneous  Best Verbal Response: Oriented  Best Motor Response: Obeys commands  Obi Coma Scale Score: 15        PHYSICAL EXAM    (up to 7 for level 4, 8 or more for level 5)     ED Triage Vitals    BP Temp Temp src Pulse Resp SpO2 Height Weight   (!) 104/56 98.8 °F (37.1 °C) -- 76 20 98 % -- 150 lb (68 kg)       Physical Exam   Constitutional: She is oriented to person, place, and time. She appears well-developed and well-nourished. No distress. Protective helmet intact   HENT:   Head: Normocephalic and atraumatic. Nose: Nose normal.   Mouth/Throat: Oropharynx is clear and moist. No oropharyngeal exudate. Eyes: Pupils are equal, round, and reactive to light. Conjunctivae and EOM are normal. Right eye exhibits no discharge. Left eye exhibits no discharge. No scleral icterus. Neck: Normal range of motion. Neck supple. No JVD present. No tracheal deviation present. No thyromegaly present. Cardiovascular: Normal rate, regular rhythm, normal heart sounds and intact distal pulses. Exam reveals no gallop and no friction rub. No murmur heard. Pulmonary/Chest: Effort normal and breath sounds normal. No stridor. No respiratory distress. She has no wheezes. She has no rales. She exhibits no tenderness. Abdominal: Soft. Bowel sounds are normal. She exhibits no distension and no mass. There is no tenderness. There is no rebound and no guarding. Musculoskeletal: Normal range of motion. She exhibits no edema, tenderness or deformity. Lymphadenopathy:     She has no cervical adenopathy. Neurological: She is alert and oriented to person, place, and time. She has normal reflexes. She displays normal reflexes. No cranial nerve deficit. She exhibits normal muscle tone. Coordination normal.   Skin: Skin is warm and dry. Capillary refill takes less than 2 seconds. No rash noted. She is not diaphoretic. No erythema. No pallor. Psychiatric: She has a normal mood and affect. Her behavior is normal. Judgment and thought content normal.   Nursing note and vitals reviewed.       DIAGNOSTIC RESULTS     EKG: All EKG's are interpreted by the Emergency Department Physician who either signs or Co-signs this chart in the absence of a cardiologist.        RADIOLOGY:   Non-plain film images such as CT, Ultrasound and MRI are read by the radiologist. Crystal Caputo radiographicimages are visualized and preliminarily interpreted by the emergency physician with the below findings:        Interpretation per the Radiologist below, if available at the time of this note:    XR LUMBAR SPINE (MIN 4 VIEWS)   Final Result   Mild degenerative changes are seen in the lumbar spine. No acute fracture is seen in the lumbar spine or coccyx. XR SACRUM COCCYX (MIN 2 VIEWS)   Final Result   Mild degenerative changes are seen in the lumbar spine. No acute fracture is seen in the lumbar spine or coccyx. CT Head WO Contrast   Final Result   No acute intracranial process is seen. .      All CT scans at this facility use dose modulation, iterative reconstruction, and/or weight based dosing when appropriate to reduce radiation dose to as low as reasonably achievable. ED BEDSIDE ULTRASOUND:   Performed by ED Physician - none    LABS:  Labs Reviewed - No data to display    All other labs were within normal range or not returned as of this dictation. EMERGENCY DEPARTMENT COURSE and DIFFERENTIALDIAGNOSIS/MDM:   Vitals:    Vitals:    05/03/19 1448 05/03/19 1551   BP: (!) 104/56 (!) 107/55   Pulse: 76 66   Resp: 20 18   Temp: 98.8 °F (37.1 °C)    SpO2: 98% 100%   Weight: 150 lb (68 kg)            MDM  Number of Diagnoses or Management Options     Amount and/or Complexity of Data Reviewed  Tests in the radiology section of CPT®: reviewed and ordered    Risk of Complications, Morbidity, and/or Mortality  Presenting problems: moderate  Diagnostic procedures: moderate  Management options: moderate    Patient Progress  Patient progress: improved      CRITICAL CARE TIME   Total Critical Care time was  minutes, excluding separately reportable procedures. There was a high probability of clinically significant/life threatening deterioration in the patient's condition which required my urgentintervention. CONSULTS:  None    PROCEDURES:  Unless otherwise noted below, none     Procedures    FINAL IMPRESSION      1.  Closed head injury,

## 2019-05-03 NOTE — ED TRIAGE NOTES
Patient brought in by Corby Tomlinson from [address] adult  she states she was in the bathroom and fell she was alert upon caregiver finding her and was only gone from the group for a few minutes. Patient does have history of seizures and is MR but she is alert and talkative with home helmet in place upon arrival. Denies any complaints of pain. Seizure pads applied to bedside. Group home care giver at bedside.

## 2019-05-24 ENCOUNTER — OFFICE VISIT (OUTPATIENT)
Dept: FAMILY MEDICINE CLINIC | Age: 54
End: 2019-05-24
Payer: MEDICARE

## 2019-05-24 VITALS
OXYGEN SATURATION: 99 % | HEIGHT: 62 IN | RESPIRATION RATE: 16 BRPM | SYSTOLIC BLOOD PRESSURE: 122 MMHG | BODY MASS INDEX: 26.87 KG/M2 | DIASTOLIC BLOOD PRESSURE: 60 MMHG | HEART RATE: 79 BPM | TEMPERATURE: 97.4 F | WEIGHT: 146 LBS

## 2019-05-24 DIAGNOSIS — E55.9 VITAMIN D DEFICIENCY: Chronic | ICD-10-CM

## 2019-05-24 DIAGNOSIS — E07.9 DISORDER OF THYROID: ICD-10-CM

## 2019-05-24 DIAGNOSIS — Q07.9 CONGENITAL ENCEPHALOPATHY (HCC): ICD-10-CM

## 2019-05-24 DIAGNOSIS — R79.89 ABNORMAL THYROID BLOOD TEST: Chronic | ICD-10-CM

## 2019-05-24 DIAGNOSIS — R79.89 ABNORMAL THYROID BLOOD TEST: Primary | Chronic | ICD-10-CM

## 2019-05-24 DIAGNOSIS — G40.909 SEIZURE DISORDER (HCC): ICD-10-CM

## 2019-05-24 PROBLEM — R29.90 STROKE-LIKE SYMPTOM: Status: RESOLVED | Noted: 2019-01-11 | Resolved: 2019-05-24

## 2019-05-24 LAB
TSH SERPL DL<=0.05 MIU/L-ACNC: 2.88 UIU/ML (ref 0.44–3.86)
VITAMIN D 25-HYDROXY: 12.3 NG/ML (ref 30–100)

## 2019-05-24 PROCEDURE — 1036F TOBACCO NON-USER: CPT | Performed by: FAMILY MEDICINE

## 2019-05-24 PROCEDURE — G8427 DOCREV CUR MEDS BY ELIG CLIN: HCPCS | Performed by: FAMILY MEDICINE

## 2019-05-24 PROCEDURE — G8419 CALC BMI OUT NRM PARAM NOF/U: HCPCS | Performed by: FAMILY MEDICINE

## 2019-05-24 PROCEDURE — 3017F COLORECTAL CA SCREEN DOC REV: CPT | Performed by: FAMILY MEDICINE

## 2019-05-24 PROCEDURE — 99214 OFFICE O/P EST MOD 30 MIN: CPT | Performed by: FAMILY MEDICINE

## 2019-05-24 NOTE — PROGRESS NOTES
Subjective  Teofilo Hardin, 46 y.o. female presents today with:  Chief Complaint   Patient presents with    Hypothyroidism     Patient is here for her 6 month follow up with multiple healt problems. HPI    Seizure disorder. No recent seizures. Congenital encephalopathy. Stable on current medications. 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 Wants to run more money. Abnormal thyroid blood test.  TSH has not been drawn yet. Vitamin D deficiency. She is unsure if she is taking M.D. on a regular basis. No other questions and or concerns for today's visit      Review of Systems  No fevers, chills, sweats. No unintended weight loss. No abdominal pain, nausea, vomiting, diarrhea, constipation, bloody stools, black tarry stools. No rashes. No swollen glands.     Past Medical History:   Diagnosis Date    Arthritis     Bacteremia due to Gram-negative bacteria 12/11/2016    Mental retardation, mild (I.Q. 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 classified elsewhere     Seizures (HCC)      Past Surgical History:   Procedure Laterality Date    FRACTURE SURGERY      ORTHOPEDIC SURGERY Bilateral     ankle    TUBAL LIGATION       Social History     Socioeconomic History    Marital status: Single     Spouse name: Not on file    Number of children: Not on file    Years of education: Not on file    Highest education level: Not on file   Occupational History    Not on file   Social Needs    Financial resource strain: Not on file    Food insecurity:     Worry: Not on file     Inability: Not on file    Transportation needs:     Medical: Not on file     Non-medical: Not on file   Tobacco Use    Smoking status: Never Smoker    Smokeless tobacco: Never Used   Substance and Sexual Activity    Alcohol use: No    Drug use: No    Sexual activity: Never   Lifestyle    Physical activity:     Days per week: Not on file     Minutes per session: Not on file    Stress: Not on file   Relationships    Social connections:     Talks on phone: Not on file     Gets together: Not on file     Attends Mormon service: Not on file     Active member of club or organization: Not on file     Attends meetings of clubs or organizations: Not on file     Relationship status: Not on file    Intimate partner violence:     Fear of current or ex partner: Not on file     Emotionally abused: Not on file     Physically abused: Not on file     Forced sexual activity: Not on file   Other Topics Concern    Not on file   Social History Narrative    Not on file     Family History   Problem Relation Age of Onset    Stroke Mother     Thyroid Disease Mother     Diabetes Sister     Diabetes Brother     Hypertension Father     Stroke Father         ICH     No Known Allergies  Current Outpatient Medications   Medication Sig Dispense Refill    cloBAZam (ONFI) 10 MG TABS tablet TAKE ONE TABLET BY MOUTH EVERY 12 HOURS  5    melatonin 3 MG TABS tablet Take 3 mg by mouth daily      vitamin D (CHOLECALCIFEROL) 5000 units CAPS capsule Take 1 capsule by mouth daily 30 capsule 1    acetaminophen (TYLENOL) 325 MG tablet Take 2 tablets by mouth every 6 hours as needed for Pain or Fever 120 tablet 3    risperiDONE (RISPERDAL) 3 MG tablet Take 3 mg by mouth 2 times daily      topiramate (TOPAMAX) 200 MG tablet Take 200 mg by mouth 2 times daily       No current facility-administered medications for this visit. PMH, Surgical Hx, Family Hx, and Social Hxreviewed and updated. Health Maintenance reviewed. Objective    Vitals:    05/24/19 1256   BP: 122/60   Site: Left Upper Arm   Position: Sitting   Cuff Size: Medium Adult   Pulse: 79   Resp: 16   Temp: 97.4 °F (36.3 °C)   TempSrc: Temporal   SpO2: 99%   Weight: 146 lb (66.2 kg)   Height: 5' 2\" (1.575 m)        Physical Exam   Constitutional: She appears well-developed and well-nourished.

## 2019-07-20 LAB — CARBAMAZEPINE LEVEL: <2 UG/ML (ref 4–10)

## 2019-11-25 ENCOUNTER — OFFICE VISIT (OUTPATIENT)
Dept: FAMILY MEDICINE CLINIC | Age: 54
End: 2019-11-25
Payer: MEDICARE

## 2019-11-25 VITALS
RESPIRATION RATE: 18 BRPM | HEART RATE: 72 BPM | SYSTOLIC BLOOD PRESSURE: 110 MMHG | BODY MASS INDEX: 27.97 KG/M2 | HEIGHT: 62 IN | TEMPERATURE: 97.7 F | DIASTOLIC BLOOD PRESSURE: 62 MMHG | OXYGEN SATURATION: 98 % | WEIGHT: 152 LBS

## 2019-11-25 DIAGNOSIS — R79.89 ABNORMAL THYROID BLOOD TEST: Chronic | ICD-10-CM

## 2019-11-25 DIAGNOSIS — Z79.899 HIGH RISK MEDICATION USE: Primary | Chronic | ICD-10-CM

## 2019-11-25 DIAGNOSIS — Z23 FLU VACCINE NEED: ICD-10-CM

## 2019-11-25 DIAGNOSIS — K92.1 BLOOD IN STOOL: Chronic | ICD-10-CM

## 2019-11-25 PROCEDURE — G8427 DOCREV CUR MEDS BY ELIG CLIN: HCPCS | Performed by: FAMILY MEDICINE

## 2019-11-25 PROCEDURE — 1036F TOBACCO NON-USER: CPT | Performed by: FAMILY MEDICINE

## 2019-11-25 PROCEDURE — 90688 IIV4 VACCINE SPLT 0.5 ML IM: CPT | Performed by: FAMILY MEDICINE

## 2019-11-25 PROCEDURE — 99214 OFFICE O/P EST MOD 30 MIN: CPT | Performed by: FAMILY MEDICINE

## 2019-11-25 PROCEDURE — G8482 FLU IMMUNIZE ORDER/ADMIN: HCPCS | Performed by: FAMILY MEDICINE

## 2019-11-25 PROCEDURE — 3017F COLORECTAL CA SCREEN DOC REV: CPT | Performed by: FAMILY MEDICINE

## 2019-11-25 PROCEDURE — G0008 ADMIN INFLUENZA VIRUS VAC: HCPCS | Performed by: FAMILY MEDICINE

## 2019-11-25 PROCEDURE — G8419 CALC BMI OUT NRM PARAM NOF/U: HCPCS | Performed by: FAMILY MEDICINE

## 2019-11-25 RX ORDER — BRIVARACETAM 50 MG/1
100 TABLET, FILM COATED ORAL
Refills: 3 | COMMUNITY
Start: 2019-11-07

## 2019-11-25 RX ORDER — CARBAMAZEPINE 300 MG/1
CAPSULE, EXTENDED RELEASE ORAL
Refills: 3 | COMMUNITY
Start: 2019-08-19 | End: 2019-11-25 | Stop reason: ALTCHOICE

## 2019-11-25 RX ORDER — MAGNESIUM OXIDE 500 MG
TABLET ORAL
Refills: 3 | COMMUNITY
Start: 2019-08-24 | End: 2019-11-25 | Stop reason: ALTCHOICE

## 2019-11-30 DIAGNOSIS — K92.1 BLOOD IN STOOL: Chronic | ICD-10-CM

## 2019-11-30 DIAGNOSIS — Z79.899 HIGH RISK MEDICATION USE: Chronic | ICD-10-CM

## 2019-11-30 LAB
ALBUMIN SERPL-MCNC: 4.2 G/DL (ref 3.5–4.6)
ALP BLD-CCNC: 110 U/L (ref 40–130)
ALT SERPL-CCNC: 17 U/L (ref 0–33)
ANION GAP SERPL CALCULATED.3IONS-SCNC: 11 MEQ/L (ref 9–15)
AST SERPL-CCNC: 13 U/L (ref 0–35)
BASOPHILS ABSOLUTE: 0 K/UL (ref 0–0.2)
BASOPHILS RELATIVE PERCENT: 1.2 %
BILIRUB SERPL-MCNC: <0.2 MG/DL (ref 0.2–0.7)
BUN BLDV-MCNC: 15 MG/DL (ref 6–20)
CALCIUM SERPL-MCNC: 8.9 MG/DL (ref 8.5–9.9)
CHLORIDE BLD-SCNC: 109 MEQ/L (ref 95–107)
CHOLESTEROL, FASTING: 235 MG/DL (ref 0–199)
CO2: 21 MEQ/L (ref 20–31)
CREAT SERPL-MCNC: 0.69 MG/DL (ref 0.5–0.9)
EOSINOPHILS ABSOLUTE: 0 K/UL (ref 0–0.7)
EOSINOPHILS RELATIVE PERCENT: 1 %
GFR AFRICAN AMERICAN: >60
GFR NON-AFRICAN AMERICAN: >60
GLOBULIN: 3.3 G/DL (ref 2.3–3.5)
GLUCOSE BLD-MCNC: 87 MG/DL (ref 70–99)
HCT VFR BLD CALC: 35.2 % (ref 37–47)
HDLC SERPL-MCNC: 100 MG/DL (ref 40–59)
HEMOGLOBIN: 11.4 G/DL (ref 12–16)
LDL CHOLESTEROL CALCULATED: 118 MG/DL (ref 0–129)
LYMPHOCYTES ABSOLUTE: 0.8 K/UL (ref 1–4.8)
LYMPHOCYTES RELATIVE PERCENT: 23 %
MCH RBC QN AUTO: 31 PG (ref 27–31.3)
MCHC RBC AUTO-ENTMCNC: 32.5 % (ref 33–37)
MCV RBC AUTO: 95.3 FL (ref 82–100)
MONOCYTES ABSOLUTE: 0.1 K/UL (ref 0.2–0.8)
MONOCYTES RELATIVE PERCENT: 2.9 %
NEUTROPHILS ABSOLUTE: 2.6 K/UL (ref 1.4–6.5)
NEUTROPHILS RELATIVE PERCENT: 73 %
PDW BLD-RTO: 15.5 % (ref 11.5–14.5)
PLATELET # BLD: 283 K/UL (ref 130–400)
PLATELET SLIDE REVIEW: NORMAL
POTASSIUM SERPL-SCNC: 3.7 MEQ/L (ref 3.4–4.9)
RBC # BLD: 3.69 M/UL (ref 4.2–5.4)
SODIUM BLD-SCNC: 141 MEQ/L (ref 135–144)
TOTAL PROTEIN: 7.5 G/DL (ref 6.3–8)
TRIGLYCERIDE, FASTING: 84 MG/DL (ref 0–150)
TSH SERPL DL<=0.05 MIU/L-ACNC: 5.28 UIU/ML (ref 0.44–3.86)
WBC # BLD: 3.6 K/UL (ref 4.8–10.8)

## 2019-12-05 ENCOUNTER — APPOINTMENT (OUTPATIENT)
Dept: CT IMAGING | Age: 54
End: 2019-12-05
Payer: MEDICARE

## 2019-12-05 ENCOUNTER — HOSPITAL ENCOUNTER (EMERGENCY)
Age: 54
Discharge: HOME | End: 2019-12-05
Attending: INTERNAL MEDICINE
Payer: MEDICARE

## 2019-12-05 VITALS
RESPIRATION RATE: 18 BRPM | OXYGEN SATURATION: 100 % | DIASTOLIC BLOOD PRESSURE: 55 MMHG | SYSTOLIC BLOOD PRESSURE: 116 MMHG | HEART RATE: 60 BPM | TEMPERATURE: 98.4 F

## 2019-12-05 DIAGNOSIS — G40.909 SEIZURE DISORDER (HCC): Primary | ICD-10-CM

## 2019-12-05 LAB
ALBUMIN SERPL-MCNC: 4.6 G/DL (ref 3.5–4.6)
ALP BLD-CCNC: 115 U/L (ref 40–130)
ALT SERPL-CCNC: 15 U/L (ref 0–33)
ANION GAP SERPL CALCULATED.3IONS-SCNC: 15 MEQ/L (ref 9–15)
AST SERPL-CCNC: 14 U/L (ref 0–35)
BASOPHILS ABSOLUTE: 0 K/UL (ref 0–0.2)
BASOPHILS RELATIVE PERCENT: 1 %
BILIRUB SERPL-MCNC: <0.2 MG/DL (ref 0.2–0.7)
BUN BLDV-MCNC: 9 MG/DL (ref 6–20)
CALCIUM SERPL-MCNC: 9.1 MG/DL (ref 8.5–9.9)
CHLORIDE BLD-SCNC: 108 MEQ/L (ref 95–107)
CO2: 19 MEQ/L (ref 20–31)
CREAT SERPL-MCNC: 0.73 MG/DL (ref 0.5–0.9)
EOSINOPHILS ABSOLUTE: 0 K/UL (ref 0–0.7)
EOSINOPHILS RELATIVE PERCENT: 1 %
GFR AFRICAN AMERICAN: >60
GFR NON-AFRICAN AMERICAN: >60
GLOBULIN: 2.9 G/DL (ref 2.3–3.5)
GLUCOSE BLD-MCNC: 117 MG/DL (ref 70–99)
HCT VFR BLD CALC: 36.4 % (ref 37–47)
HEMOGLOBIN: 11.9 G/DL (ref 12–16)
LYMPHOCYTES ABSOLUTE: 1.5 K/UL (ref 1–4.8)
LYMPHOCYTES RELATIVE PERCENT: 41 %
MCH RBC QN AUTO: 30.7 PG (ref 27–31.3)
MCHC RBC AUTO-ENTMCNC: 32.7 % (ref 33–37)
MCV RBC AUTO: 93.9 FL (ref 82–100)
MONOCYTES ABSOLUTE: 0.4 K/UL (ref 0.2–0.8)
MONOCYTES RELATIVE PERCENT: 10 %
NEUTROPHILS ABSOLUTE: 1.7 K/UL (ref 1.4–6.5)
NEUTROPHILS RELATIVE PERCENT: 47 %
PDW BLD-RTO: 15.1 % (ref 11.5–14.5)
PLATELET # BLD: 260 K/UL (ref 130–400)
PLATELET SLIDE REVIEW: ADEQUATE
POTASSIUM SERPL-SCNC: 3.4 MEQ/L (ref 3.4–4.9)
RBC # BLD: 3.88 M/UL (ref 4.2–5.4)
RBC # BLD: NORMAL 10*6/UL
SODIUM BLD-SCNC: 142 MEQ/L (ref 135–144)
TOTAL PROTEIN: 7.5 G/DL (ref 6.3–8)
WBC # BLD: 3.6 K/UL (ref 4.8–10.8)

## 2019-12-05 PROCEDURE — 80053 COMPREHEN METABOLIC PANEL: CPT

## 2019-12-05 PROCEDURE — 85025 COMPLETE CBC W/AUTO DIFF WBC: CPT

## 2019-12-05 PROCEDURE — 72125 CT NECK SPINE W/O DYE: CPT

## 2019-12-05 PROCEDURE — 70450 CT HEAD/BRAIN W/O DYE: CPT

## 2019-12-05 PROCEDURE — 99285 EMERGENCY DEPT VISIT HI MDM: CPT

## 2019-12-05 PROCEDURE — 36415 COLL VENOUS BLD VENIPUNCTURE: CPT

## 2019-12-12 ENCOUNTER — OFFICE VISIT (OUTPATIENT)
Dept: FAMILY MEDICINE CLINIC | Age: 54
End: 2019-12-12
Payer: MEDICARE

## 2019-12-12 VITALS
DIASTOLIC BLOOD PRESSURE: 60 MMHG | WEIGHT: 153 LBS | SYSTOLIC BLOOD PRESSURE: 122 MMHG | BODY MASS INDEX: 28.16 KG/M2 | TEMPERATURE: 97.7 F | RESPIRATION RATE: 16 BRPM | HEIGHT: 62 IN | HEART RATE: 64 BPM | OXYGEN SATURATION: 99 %

## 2019-12-12 DIAGNOSIS — E03.9 ACQUIRED HYPOTHYROIDISM: Primary | Chronic | ICD-10-CM

## 2019-12-12 PROCEDURE — G8482 FLU IMMUNIZE ORDER/ADMIN: HCPCS | Performed by: FAMILY MEDICINE

## 2019-12-12 PROCEDURE — G8427 DOCREV CUR MEDS BY ELIG CLIN: HCPCS | Performed by: FAMILY MEDICINE

## 2019-12-12 PROCEDURE — 1036F TOBACCO NON-USER: CPT | Performed by: FAMILY MEDICINE

## 2019-12-12 PROCEDURE — 99213 OFFICE O/P EST LOW 20 MIN: CPT | Performed by: FAMILY MEDICINE

## 2019-12-12 PROCEDURE — 3017F COLORECTAL CA SCREEN DOC REV: CPT | Performed by: FAMILY MEDICINE

## 2019-12-12 PROCEDURE — G8419 CALC BMI OUT NRM PARAM NOF/U: HCPCS | Performed by: FAMILY MEDICINE

## 2019-12-12 RX ORDER — LEVOTHYROXINE SODIUM 0.07 MG/1
75 TABLET ORAL DAILY
Qty: 90 TABLET | Refills: 4 | Status: SHIPPED | OUTPATIENT
Start: 2019-12-12 | End: 2020-12-21 | Stop reason: SDUPTHER

## 2020-03-06 ENCOUNTER — APPOINTMENT (OUTPATIENT)
Dept: CT IMAGING | Age: 55
End: 2020-03-06
Payer: MEDICARE

## 2020-03-06 ENCOUNTER — HOSPITAL ENCOUNTER (EMERGENCY)
Age: 55
Discharge: HOME | End: 2020-03-06
Payer: MEDICARE

## 2020-03-06 VITALS
BODY MASS INDEX: 27.98 KG/M2 | HEART RATE: 64 BPM | DIASTOLIC BLOOD PRESSURE: 88 MMHG | RESPIRATION RATE: 14 BRPM | TEMPERATURE: 97 F | OXYGEN SATURATION: 100 % | HEIGHT: 62 IN | SYSTOLIC BLOOD PRESSURE: 126 MMHG

## 2020-03-06 LAB
ALBUMIN SERPL-MCNC: 4.2 G/DL (ref 3.5–4.6)
ALP BLD-CCNC: 127 U/L (ref 40–130)
ALT SERPL-CCNC: 17 U/L (ref 0–33)
ANION GAP SERPL CALCULATED.3IONS-SCNC: 13 MEQ/L (ref 9–15)
AST SERPL-CCNC: 15 U/L (ref 0–35)
BASOPHILS ABSOLUTE: 0.1 K/UL (ref 0–0.2)
BASOPHILS RELATIVE PERCENT: 1.2 %
BILIRUB SERPL-MCNC: <0.2 MG/DL (ref 0.2–0.7)
BUN BLDV-MCNC: 10 MG/DL (ref 6–20)
CALCIUM SERPL-MCNC: 8.8 MG/DL (ref 8.5–9.9)
CHLORIDE BLD-SCNC: 103 MEQ/L (ref 95–107)
CO2: 20 MEQ/L (ref 20–31)
CREAT SERPL-MCNC: 0.71 MG/DL (ref 0.5–0.9)
EKG ATRIAL RATE: 67 BPM
EKG P AXIS: 56 DEGREES
EKG P-R INTERVAL: 134 MS
EKG Q-T INTERVAL: 404 MS
EKG QRS DURATION: 86 MS
EKG QTC CALCULATION (BAZETT): 426 MS
EKG R AXIS: 51 DEGREES
EKG T AXIS: 40 DEGREES
EKG VENTRICULAR RATE: 67 BPM
EOSINOPHILS ABSOLUTE: 0.1 K/UL (ref 0–0.7)
EOSINOPHILS RELATIVE PERCENT: 1.2 %
GFR AFRICAN AMERICAN: >60
GFR NON-AFRICAN AMERICAN: >60
GLOBULIN: 3.1 G/DL (ref 2.3–3.5)
GLUCOSE BLD-MCNC: 102 MG/DL (ref 70–99)
HCT VFR BLD CALC: 36.5 % (ref 37–47)
HEMOGLOBIN: 12.1 G/DL (ref 12–16)
LYMPHOCYTES ABSOLUTE: 1.4 K/UL (ref 1–4.8)
LYMPHOCYTES RELATIVE PERCENT: 31.2 %
MAGNESIUM: 2.1 MG/DL (ref 1.7–2.4)
MCH RBC QN AUTO: 30.7 PG (ref 27–31.3)
MCHC RBC AUTO-ENTMCNC: 33 % (ref 33–37)
MCV RBC AUTO: 93.1 FL (ref 82–100)
MONOCYTES ABSOLUTE: 0.3 K/UL (ref 0.2–0.8)
MONOCYTES RELATIVE PERCENT: 7.4 %
NEUTROPHILS ABSOLUTE: 2.7 K/UL (ref 1.4–6.5)
NEUTROPHILS RELATIVE PERCENT: 59 %
PDW BLD-RTO: 14.1 % (ref 11.5–14.5)
PLATELET # BLD: 276 K/UL (ref 130–400)
POTASSIUM SERPL-SCNC: 3.1 MEQ/L (ref 3.4–4.9)
PROLACTIN: 86.4 NG/ML
RBC # BLD: 3.92 M/UL (ref 4.2–5.4)
SODIUM BLD-SCNC: 136 MEQ/L (ref 135–144)
TOTAL PROTEIN: 7.3 G/DL (ref 6.3–8)
TROPONIN: <0.01 NG/ML (ref 0–0.01)
WBC # BLD: 4.6 K/UL (ref 4.8–10.8)

## 2020-03-06 PROCEDURE — 80053 COMPREHEN METABOLIC PANEL: CPT

## 2020-03-06 PROCEDURE — 85025 COMPLETE CBC W/AUTO DIFF WBC: CPT

## 2020-03-06 PROCEDURE — 93005 ELECTROCARDIOGRAM TRACING: CPT

## 2020-03-06 PROCEDURE — 84484 ASSAY OF TROPONIN QUANT: CPT

## 2020-03-06 PROCEDURE — 84146 ASSAY OF PROLACTIN: CPT

## 2020-03-06 PROCEDURE — 70450 CT HEAD/BRAIN W/O DYE: CPT

## 2020-03-06 PROCEDURE — 6370000000 HC RX 637 (ALT 250 FOR IP): Performed by: NURSE PRACTITIONER

## 2020-03-06 PROCEDURE — 99283 EMERGENCY DEPT VISIT LOW MDM: CPT

## 2020-03-06 PROCEDURE — 36415 COLL VENOUS BLD VENIPUNCTURE: CPT

## 2020-03-06 PROCEDURE — 99284 EMERGENCY DEPT VISIT MOD MDM: CPT

## 2020-03-06 PROCEDURE — 83735 ASSAY OF MAGNESIUM: CPT

## 2020-03-06 RX ADMIN — POTASSIUM BICARBONATE 40 MEQ: 782 TABLET, EFFERVESCENT ORAL at 16:50

## 2020-03-06 ASSESSMENT — ENCOUNTER SYMPTOMS
CONSTIPATION: 0
COUGH: 0
SHORTNESS OF BREATH: 0
NAUSEA: 0
WHEEZING: 0
BLOOD IN STOOL: 0
ABDOMINAL PAIN: 0
VOMITING: 0
EYE DISCHARGE: 0
DIARRHEA: 0
EYE REDNESS: 0
RHINORRHEA: 0
TROUBLE SWALLOWING: 0
COLOR CHANGE: 0
SORE THROAT: 0
BACK PAIN: 0
EYE PAIN: 0

## 2020-03-06 NOTE — ED NOTES
Pt awake calm and cooperative at this time. family at bedside. Pt denies pain.       Chuyita Carmichael, OSCAR  03/06/20 7319

## 2020-03-06 NOTE — ED PROVIDER NOTES
Coma Scale  Eye Opening: Spontaneous  Best Verbal Response: Oriented  Best Motor Response: Obeys commands  Obi Coma Scale Score: 15        PHYSICAL EXAM    (up to 7 for level 4, 8 or more for level 5)     ED Triage Vitals [03/06/20 1549]   BP Temp Temp src Pulse Resp SpO2 Height Weight   106/68 97 °F (36.1 °C) -- 66 19 100 % 5' 2\" (1.575 m) --       Physical Exam  Vitals signs and nursing note reviewed. Constitutional:       General: She is not in acute distress. Appearance: She is well-developed. She is not diaphoretic. HENT:      Head: Normocephalic and atraumatic. Nose: Nose normal.   Eyes:      Conjunctiva/sclera: Conjunctivae normal.      Pupils: Pupils are equal, round, and reactive to light. Neck:      Musculoskeletal: Normal range of motion and neck supple. Cardiovascular:      Rate and Rhythm: Normal rate and regular rhythm. Heart sounds: Normal heart sounds. Pulmonary:      Effort: Pulmonary effort is normal. No respiratory distress. Breath sounds: Normal breath sounds. No wheezing. Abdominal:      General: Bowel sounds are normal.      Palpations: Abdomen is soft. Tenderness: There is no abdominal tenderness. Skin:     General: Skin is warm and dry. Capillary Refill: Capillary refill takes less than 2 seconds. Findings: No rash. Neurological:      Mental Status: She is alert and oriented to person, place, and time. Cranial Nerves: No cranial nerve deficit. Psychiatric:         Behavior: Behavior normal.         RESULTS     EKG: All EKG's are interpreted by the Emergency Department Physician who either signs or Co-signsthis chart in the absence of a cardiologist.    Normal sinus rhythm, rate of 67 bpm.  Next elevations noted. No ectopy.     RADIOLOGY:   Non-plain filmimages such as CT, Ultrasound and MRI are read by the radiologist. Plain radiographic images are visualized and preliminarily interpreted by the emergency physician with the below findings:        Interpretation per the Radiologist below, if available at the time ofthis note:    CT Head WO Contrast   Final Result      No acute intracranial process. ED BEDSIDE ULTRASOUND:   Performed by ED Physician - none    LABS:  Labs Reviewed   COMPREHENSIVE METABOLIC PANEL - Abnormal; Notable for the following components:       Result Value    Potassium 3.1 (*)     Glucose 102 (*)     All other components within normal limits   CBC WITH AUTO DIFFERENTIAL - Abnormal; Notable for the following components:    WBC 4.6 (*)     RBC 3.92 (*)     Hematocrit 36.5 (*)     All other components within normal limits   TROPONIN   MAGNESIUM   PROLACTIN       All other labs were within normal range or not returned as of this dictation. EMERGENCY DEPARTMENT COURSE and DIFFERENTIAL DIAGNOSIS/MDM:   Vitals:    Vitals:    03/06/20 1549   BP: 106/68   Pulse: 66   Resp: 19   Temp: 97 °F (36.1 °C)   SpO2: 100%   Height: 5' 2\" (1.575 m)            MDM   Patient is a 18-year-old female presenting with chief complaint of possible seizure. She had a mechanical fall and the people nearby thought that she may have been postictal when they went to check on her. She is hemodynamically stable nontoxic-appearing. She would like to be gotten out of the ER by 530 and states that she has absolutely no symptoms at this time. Patient's labs are unremarkable. CT head is negative. She did have mild hypokalemia which I replaced in the emergency department. Consulted Dr. Britt Rajan placed. Waiting for Dr. Britt Rajan, neurologist, to call back. Dr. Britt Rajan agrees with plan to discharge patient at this time and considering medication she is on at home, he does not believe that any changes should be made at this time. He states that she can follow-up in outpatient setting. This information was communicated to the patient and the patient feels comfortable being discharged.   She is discharged in stable condition stable vital

## 2020-03-10 ENCOUNTER — OFFICE VISIT (OUTPATIENT)
Dept: FAMILY MEDICINE CLINIC | Age: 55
End: 2020-03-10
Payer: MEDICARE

## 2020-03-10 VITALS
RESPIRATION RATE: 14 BRPM | BODY MASS INDEX: 28.49 KG/M2 | TEMPERATURE: 96.4 F | HEIGHT: 62 IN | HEART RATE: 70 BPM | DIASTOLIC BLOOD PRESSURE: 76 MMHG | OXYGEN SATURATION: 100 % | WEIGHT: 154.8 LBS | SYSTOLIC BLOOD PRESSURE: 112 MMHG

## 2020-03-10 DIAGNOSIS — G40.909 SEIZURE DISORDER (HCC): ICD-10-CM

## 2020-03-10 DIAGNOSIS — E87.6 HYPOKALEMIA: ICD-10-CM

## 2020-03-10 LAB
ANION GAP SERPL CALCULATED.3IONS-SCNC: 12 MEQ/L (ref 9–15)
BUN BLDV-MCNC: 10 MG/DL (ref 6–20)
CALCIUM SERPL-MCNC: 8.9 MG/DL (ref 8.5–9.9)
CHLORIDE BLD-SCNC: 108 MEQ/L (ref 95–107)
CO2: 21 MEQ/L (ref 20–31)
CREAT SERPL-MCNC: 0.8 MG/DL (ref 0.5–0.9)
GFR AFRICAN AMERICAN: >60
GFR NON-AFRICAN AMERICAN: >60
GLUCOSE BLD-MCNC: 92 MG/DL (ref 70–99)
POTASSIUM SERPL-SCNC: 3.5 MEQ/L (ref 3.4–4.9)
SODIUM BLD-SCNC: 141 MEQ/L (ref 135–144)

## 2020-03-10 PROCEDURE — G8419 CALC BMI OUT NRM PARAM NOF/U: HCPCS | Performed by: PHYSICIAN ASSISTANT

## 2020-03-10 PROCEDURE — G8482 FLU IMMUNIZE ORDER/ADMIN: HCPCS | Performed by: PHYSICIAN ASSISTANT

## 2020-03-10 PROCEDURE — 99214 OFFICE O/P EST MOD 30 MIN: CPT | Performed by: PHYSICIAN ASSISTANT

## 2020-03-10 PROCEDURE — 1036F TOBACCO NON-USER: CPT | Performed by: PHYSICIAN ASSISTANT

## 2020-03-10 PROCEDURE — 3017F COLORECTAL CA SCREEN DOC REV: CPT | Performed by: PHYSICIAN ASSISTANT

## 2020-03-10 PROCEDURE — G8427 DOCREV CUR MEDS BY ELIG CLIN: HCPCS | Performed by: PHYSICIAN ASSISTANT

## 2020-03-10 ASSESSMENT — PATIENT HEALTH QUESTIONNAIRE - PHQ9
SUM OF ALL RESPONSES TO PHQ9 QUESTIONS 1 & 2: 0
SUM OF ALL RESPONSES TO PHQ QUESTIONS 1-9: 0
1. LITTLE INTEREST OR PLEASURE IN DOING THINGS: 0
2. FEELING DOWN, DEPRESSED OR HOPELESS: 0
SUM OF ALL RESPONSES TO PHQ QUESTIONS 1-9: 0

## 2020-03-10 NOTE — PROGRESS NOTES
Marital status: Single     Spouse name: Not on file    Number of children: Not on file    Years of education: Not on file    Highest education level: Not on file   Occupational History    Not on file   Social Needs    Financial resource strain: Not on file    Food insecurity     Worry: Not on file     Inability: Not on file    Transportation needs     Medical: Not on file     Non-medical: Not on file   Tobacco Use    Smoking status: Never Smoker    Smokeless tobacco: Never Used   Substance and Sexual Activity    Alcohol use: No    Drug use: No    Sexual activity: Never   Lifestyle    Physical activity     Days per week: Not on file     Minutes per session: Not on file    Stress: Not on file   Relationships    Social connections     Talks on phone: Not on file     Gets together: Not on file     Attends Jain service: Not on file     Active member of club or organization: Not on file     Attends meetings of clubs or organizations: Not on file     Relationship status: Not on file    Intimate partner violence     Fear of current or ex partner: Not on file     Emotionally abused: Not on file     Physically abused: Not on file     Forced sexual activity: Not on file   Other Topics Concern    Not on file   Social History Narrative    Not on file     Family History   Problem Relation Age of Onset    Stroke Mother     Thyroid Disease Mother     Diabetes Sister     Diabetes Brother     Hypertension Father     Stroke Father         ICH     No Known Allergies  Current Outpatient Medications   Medication Sig Dispense Refill    levothyroxine (SYNTHROID) 75 MCG tablet Take 1 tablet by mouth daily 90 tablet 4    BRIVIACT 50 MG TABS TK 1 T PO Q 12 H  3    cloBAZam (ONFI) 10 MG TABS tablet TAKE ONE TABLET BY MOUTH EVERY 12 HOURS  5    vitamin D (CHOLECALCIFEROL) 5000 units CAPS capsule Take 1 capsule by mouth daily 30 capsule 1    acetaminophen (TYLENOL) 325 MG tablet Take 2 tablets by mouth every 6 that there is no change from baseline. Assessment & Plan   Sydnie Ferraro was seen today for check-up. Diagnoses and all orders for this visit:    Seizure disorder [facility])  -     Basic Metabolic Panel; Future    Hypokalemia  -     Basic Metabolic Panel; Future    Reviewed ER notes, lab, imaging. Will repeat BMP today. Has follow up with PCP in May. Call with any questions or concerns. Orders Placed This Encounter   Procedures    Basic Metabolic Panel     Standing Status:   Future     Number of Occurrences:   1     Standing Expiration Date:   3/10/2021     No orders of the defined types were placed in this encounter. There are no discontinued medications. No follow-ups on file. Reviewed with the patient: current clinical status, medications, activities and diet. Side effects, adverse effects of the medication prescribed today, as well as treatment plan/ rationale and result expectations have been discussed with the patient who expresses understanding and desires to proceed. Close follow up to evaluate treatment results and for coordination of care. I have reviewed the patient's medical history in detail and updated the computerized patient record.     Ashlyn Buckner PA-C

## 2020-03-12 ASSESSMENT — ENCOUNTER SYMPTOMS
SHORTNESS OF BREATH: 0
COLOR CHANGE: 0
COUGH: 0

## 2020-05-13 ENCOUNTER — TELEPHONE (OUTPATIENT)
Dept: INTERNAL MEDICINE CLINIC | Age: 55
End: 2020-05-13

## 2020-06-19 ENCOUNTER — VIRTUAL VISIT (OUTPATIENT)
Dept: FAMILY MEDICINE CLINIC | Age: 55
End: 2020-06-19
Payer: MEDICARE

## 2020-06-19 PROCEDURE — 99442 PR PHYS/QHP TELEPHONE EVALUATION 11-20 MIN: CPT | Performed by: FAMILY MEDICINE

## 2020-06-19 NOTE — PROGRESS NOTES
Mine Sims is a 63 y.o. female evaluated via telephone on 2020. Consent:  She and/or health care decision maker is aware that that she may receive a bill for this telephone service, depending on her insurance coverage, and has provided verbal consent to proceed: Yes      Documentation:  I communicated with the patient and/or health care decision maker about see below. Details of this discussion including any medical advice provided: see below    I affirm this is a Patient Initiated Episode with an Established Patient who has not had a related appointment within my department in the past 7 days or scheduled within the next 24 hours. Total Time: minutes: 11-20 minutes    Note: not billable if this call serves to triage the patient into an appointment for the relevant concern      Main CARUSO     2020    TELEHEALTH EVALUATION -- Audio (During VJIJW-20 public health emergency)    HPI:    Mine Sims (:  1965) has requested an audio evaluation for the following concern(s):    Sister, Adalberto Dao, is present during phone call. Seizures - has had several seizures over the last few months; her neurologist increased her Breviact. 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 Is well-controlled with topiramate and risperidone. Sister isn't sure when work will reopen. Hypothyroidism. Compliant with levothyroxine which is taken correctly. No diarrhea, constipation, palpitations, dry skin, depression, difficulty sleeping or fatigue. No weight gain or loss. Review of Systems    Prior to Visit Medications    Medication Sig Taking? Authorizing Provider   levothyroxine (SYNTHROID) 75 MCG tablet Take 1 tablet by mouth daily  Bere Parrish MD   BRIVIACT 50 MG TABS 100 mg.  Take 100mg QAM and 200mg QPM  Historical Provider, MD   cloBAZam (ONFI) 10 MG TABS tablet TAKE ONE TABLET BY MOUTH EVERY 12 HOURS  Historical Provider, MD vitamin D (CHOLECALCIFEROL) 5000 units CAPS capsule Take 1 capsule by mouth daily  Zan Toney DO   acetaminophen (TYLENOL) 325 MG tablet Take 2 tablets by mouth every 6 hours as needed for Pain or Fever  Dustin Kirk MD   risperiDONE (RISPERDAL) 3 MG tablet Take 3 mg by mouth 2 times daily  Historical Provider, MD   topiramate (TOPAMAX) 200 MG tablet Take 200 mg by mouth 2 times daily  Historical Provider, MD       Social History     Tobacco Use    Smoking status: Never Smoker    Smokeless tobacco: Never Used   Substance Use Topics    Alcohol use: No    Drug use: No        No Known Allergies,   Past Medical History:   Diagnosis Date    Acquired hypothyroidism     Arthritis     Bacteremia due to Gram-negative bacteria 12/11/2016    Mental retardation, mild (I.Q. 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 classified elsewhere     Seizures ([facility] [address]        PHYSICAL EXAMINATION:  [ INSTRUCTIONS:  \"[x]\" Indicates a positive item  \"[]\" Indicates a negative item  -- DELETE ALL ITEMS NOT EXAMINED]  Vital Signs: unavailable    Patient appears to be alert and oriented to person, place, and is in no acute distress. Respiratory effort appears normal. Mood appears stable and speech and thought are at her  Baseline; dysarthria present; cognitive impairment present  ASSESSMENT/PLAN:  Diagnoses and all orders for this visit:    Acquired hypothyroidism  Comments: Will follow labs. Adjust levothyroxine accordingly  Orders:  -     TSH Without Reflex; Future    Seizure disorder [facility])  Comments:  Managed by neurology. Dose has been adjusted. Family struggling to get medication approval from insurance    Congenital encephalopathy ([facility]  Comments:  Stable, fair control. Workshop is close due to [place]. Vitamin D deficiency  -     Vitamin D 25 Hydroxy;  Future    Abnormal glucose  -     Hemoglobin A1C; Future          Return in about 6 months (around 12/19/2020) for seizure d/o , Thyroid- Kriss Carpenter is a 63 y.o. female being evaluated by a Virtual Visit (telephonic visit) encounter to address concerns as mentioned above. A caregiver was present when appropriate. Due to this being a TeleHealth encounter ([facility]), evaluation of the following organ systems was limited: Vitals/Constitutional/EENT/Resp/CV/GI//MS/Neuro/Skin/Heme-Lymph-Imm. Pursuant to the emergency declaration under the [address] [place] [address] and the [employer] and [employer] Act, this Virtual Visit was conducted with patient's (and/or legal guardian's) consent, to reduce the patient's risk of exposure to COVID-19 and provide necessary medical care. The patient (and/or legal guardian) has also been advised to contact this office for worsening conditions or problems, and seek emergency medical treatment and/or call 911 if deemed necessary. Services were provided through a telephonic synchronous discussion virtually to substitute for in-person clinic visit. Patient and provider were located at their individual homes. --Travon Heath MD on 6/19/2020 at 1:57 PM    An electronic signature was used to authenticate this note.

## 2020-09-29 DIAGNOSIS — E55.9 VITAMIN D DEFICIENCY: Chronic | ICD-10-CM

## 2020-09-29 DIAGNOSIS — E03.9 ACQUIRED HYPOTHYROIDISM: Chronic | ICD-10-CM

## 2020-09-29 LAB
HBA1C MFR BLD: 5.6 % (ref 4.8–5.9)
TSH SERPL DL<=0.05 MIU/L-ACNC: 0.9 UIU/ML (ref 0.44–3.86)
VITAMIN D 25-HYDROXY: 13.2 NG/ML (ref 30–100)

## 2020-09-30 LAB — TOPIRAMATE LEVEL: 22.5 UG/ML (ref 5–20)

## 2020-12-21 ENCOUNTER — VIRTUAL VISIT (OUTPATIENT)
Dept: FAMILY MEDICINE CLINIC | Age: 55
End: 2020-12-21
Payer: MEDICARE

## 2020-12-21 PROCEDURE — 3017F COLORECTAL CA SCREEN DOC REV: CPT | Performed by: FAMILY MEDICINE

## 2020-12-21 PROCEDURE — G8484 FLU IMMUNIZE NO ADMIN: HCPCS | Performed by: FAMILY MEDICINE

## 2020-12-21 PROCEDURE — G8427 DOCREV CUR MEDS BY ELIG CLIN: HCPCS | Performed by: FAMILY MEDICINE

## 2020-12-21 PROCEDURE — G0438 PPPS, INITIAL VISIT: HCPCS | Performed by: FAMILY MEDICINE

## 2020-12-21 PROCEDURE — 99214 OFFICE O/P EST MOD 30 MIN: CPT | Performed by: FAMILY MEDICINE

## 2020-12-21 RX ORDER — LEVOTHYROXINE SODIUM 0.07 MG/1
75 TABLET ORAL DAILY
Qty: 90 TABLET | Refills: 4 | Status: SHIPPED | OUTPATIENT
Start: 2020-12-21 | End: 2022-02-22

## 2020-12-21 ASSESSMENT — LIFESTYLE VARIABLES
AUDIT TOTAL SCORE: INCOMPLETE
HOW OFTEN DO YOU HAVE A DRINK CONTAINING ALCOHOL: 0
AUDIT-C TOTAL SCORE: INCOMPLETE
HOW OFTEN DO YOU HAVE A DRINK CONTAINING ALCOHOL: NEVER

## 2020-12-21 ASSESSMENT — PATIENT HEALTH QUESTIONNAIRE - PHQ9
SUM OF ALL RESPONSES TO PHQ QUESTIONS 1-9: 0
2. FEELING DOWN, DEPRESSED OR HOPELESS: 0
1. LITTLE INTEREST OR PLEASURE IN DOING THINGS: 0
SUM OF ALL RESPONSES TO PHQ QUESTIONS 1-9: 0
SUM OF ALL RESPONSES TO PHQ QUESTIONS 1-9: 0
SUM OF ALL RESPONSES TO PHQ9 QUESTIONS 1 & 2: 0

## 2020-12-21 NOTE — PROGRESS NOTES
Medicare Annual Wellness Visit  Are Name: Stacy Salomon Date: 2020   MRN: [id number] Sex: Female   Age: 63 y.o. Ethnicity: /   : 1965 Race:       Lucretia Frazier is here for Medicare AWV    Screenings for behavioral, psychosocial and functional/safety risks, and cognitive dysfunction are all negative except as indicated below. These results, as well as other patient data from the [address] form, are documented in Flowsheets linked to this Encounter. No Known Allergies    Prior to Visit Medications    Medication Sig Taking? Authorizing Provider   zoster recombinant adjuvanted vaccine [facility]) 50 MCG/0.5ML SUSR injection Inject 0.5 mLs into the muscle See Admin Instructions 1 dose now and repeat in 2-6 months  Kira Tay MD   levothyroxine (SYNTHROID) 75 MCG tablet Take 1 tablet by mouth daily  Kira Tay MD   BRIVIACT 50 MG TABS 100 mg.  Take 100mg QAM and 200mg QPM  Historical Provider, MD   cloBAZam (ONFI) 10 MG TABS tablet TAKE ONE TABLET BY MOUTH EVERY 12 HOURS  Historical Provider, MD   vitamin D (CHOLECALCIFEROL) 5000 units CAPS capsule Take 1 capsule by mouth daily  Sen Pate DO   acetaminophen (TYLENOL) 325 MG tablet Take 2 tablets by mouth every 6 hours as needed for Pain or Fever  Hung Ambriz MD   risperiDONE (RISPERDAL) 3 MG tablet Take 3 mg by mouth 2 times daily  Historical Provider, MD   topiramate (TOPAMAX) 200 MG tablet Take 200 mg by mouth 2 times daily  Historical Provider, MD       Past Medical History:   Diagnosis Date    Acquired hypothyroidism     Arthritis     Bacteremia due to Gram-negative bacteria 2016    Mental retardation, mild (I.Q. 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 classified elsewhere     Seizures ([facility] [address]        Past Surgical History:   Procedure Laterality Date    FRACTURE SURGERY      ORTHOPEDIC SURGERY Bilateral     ankle Health Habits/Nutrition  Do you exercise for at least 20 minutes 2-3 times per week?: (!) No  Have you lost any weight without trying in the past 3 months?: No  Do you eat fewer than 2 meals per day?: No  Have you seen a dentist within the past year?: Yes     Health Habits/Nutrition Interventions:  · Inadequate physical activity:  educational materials provided to promote increased physical activity      ADL:  ADLs  In the past 7 days, did you need help from others to perform any of the following everyday activities? Eating, dressing, grooming, bathing, toileting, or walking/balance?: None  In the past 7 days, did you need help from others to take care of any of the following?  Laundry, housekeeping, banking/finances, shopping, telephone use, food preparation, transportation, or taking medications?: [employer], Housekeeping, Food Preparation, Transportation  ADL Interventions:  · Patient declines any further evaluation/treatment for this issue    Personalized Preventive Plan   Current Health Maintenance Status  Immunization History   Administered Date(s) Administered    Influenza Virus Vaccine 11/25/2019    Influenza, Doralee Age, 6 mo and older, IM, PF (Flulaval, Fluarix) 01/13/2019    Influenza, Quadv, IM, (6 mo and older Fluzone, Flulaval, Fluarix and 3 yrs and older Afluria) 11/25/2019        Health Maintenance   Topic Date Due    DTaP/Tdap/Td vaccine (1 - Tdap) 06/15/1983    Breast cancer screen  06/15/2014    Shingles Vaccine (1 of 2) 06/15/2014    Annual Wellness Visit (AWV)  05/29/2019    Flu vaccine (1) 09/01/2020    Cervical cancer screen  12/21/2025 (Originally 6/15/1985)    TSH testing  09/29/2021    Colon cancer screen fecal DNA test (Cologuard)  03/19/2022    Lipid screen  11/30/2024    Hepatitis A vaccine  Aged Out    Hepatitis B vaccine  Aged Out    Hib vaccine  Aged Out    Meningococcal (ACWY) vaccine  Aged Out    Pneumococcal 0-64 years Vaccine  Aged Barnhart  Hepatitis C screen  Discontinued    HIV screen  Discontinued     Recommendations for Preventive Services Due: see orders and patient instructions/AVS.  . Recommended screening schedule for the next 5-10 years is provided to the patient in written form: see Patient Instructions/AVS.    There are no diagnoses linked to this encounter. Matt Hardin is a 63 y.o. female being evaluated by a Virtual Visit (phone) encounter to address concerns as mentioned above. A caregiver was present when appropriate. Due to this being a TeleHealth encounter (During [facility] public health emergency), evaluation of the following organ systems was limited: Vitals/Constitutional/EENT/Resp/CV/GI//MS/Neuro/Skin/Heme-Lymph-Imm. Pursuant to the emergency declaration under the [address] [place] [address] authority and the [employer] and [employer] Act, this Virtual Visit was conducted with patient's (and/or legal guardian's) consent, to reduce the patient's risk of exposure to COVID-19 and provide necessary medical care. The patient (and/or legal guardian) has also been advised to contact this office for worsening conditions or problems, and seek emergency medical treatment and/or call 911 if deemed necessary. Patient identification was verified at the start of the visit: Yes    Services were provided through phone to substitute for in-person clinic visit. Patient and provider were located at their individual homes. --Sarah Sevilla MD on 12/21/2020 at 1:42 PM    An electronic signature was used to authenticate this note.

## 2020-12-21 NOTE — PATIENT INSTRUCTIONS
Personalized Preventive Plan for Sheyla Eid - 12/21/2020  Medicare offers a range of preventive health benefits. Some of the tests and screenings are paid in full while other may be subject to a deductible, co-insurance, and/or copay. Some of these benefits include a comprehensive review of your medical history including lifestyle, illnesses that may run in your family, and various assessments and screenings as appropriate. After reviewing your medical record and screening and assessments performed today your provider may have ordered immunizations, labs, imaging, and/or referrals for you. A list of these orders (if applicable) as well as your Preventive Care list are included within your After Visit Summary for your review. Other Preventive Recommendations:    · A preventive eye exam performed by an eye specialist is recommended every 1-2 years to screen for glaucoma; cataracts, macular degeneration, and other eye disorders. · A preventive dental visit is recommended every 6 months. · Try to get at least 150 minutes of exercise per week or 10,000 steps per day on a pedometer . · Order or download the FREE \"Exercise & Physical Activity: Your Everyday Guide\" from [employer]. Call 2-113.928.6537 or search [employer] online. · You need 8817-4570 mg of calcium and 7979-4534 IU of vitamin D per day. It is possible to meet your calcium requirement with diet alone, but a vitamin D supplement is usually necessary to meet this goal.  · When exposed to the sun, use a sunscreen that protects against both UVA and UVB radiation with an SPF of 30 or greater. Reapply every 2 to 3 hours or after sweating, drying off with a towel, or swimming. · Always wear a seat belt when traveling in a car. Always wear a helmet when riding a bicycle or motorcycle.

## 2020-12-21 NOTE — PROGRESS NOTES
Marilin Law is a 63 y.o. female evaluated via telephone on 2020. Consent:  She and/or health care decision maker is aware that that she may receive a bill for this telephone service, depending on her insurance coverage, and has provided verbal consent to proceed: Yes      Documentation:  I communicated with the patient and/or health care decision maker about see below. Details of this discussion including any medical advice provided: see below      I affirm this is a Patient Initiated Episode with an Established Patient who has not had a related appointment within my department in the past 7 days or scheduled within the next 24 hours. Total Time: minutes: 11-20 minutes    Note: not billable if this call serves to triage the patient into an appointment for the relevant concern      Mango CARUSO     2020    TELEHEALTH EVALUATION -- Audio (During JRJLW-10 public health emergency)    HPI:    Marilin Law (:  1965) has requested an audio evaluation for the following concern(s):    6 Month Follow-Up (no concerns )    Hypothyroidism. Compliant with levothyroxine which is taken correctly. No diarrhea, constipation, palpitations, dry skin, depression, difficulty sleeping or fatigue. No weight gain or loss. Seizure Disorder. No new deficits. No facial droop. No new speech impairment. No unilateral weakness. Last seizure was 2 weeks ago while she was in bed. It was mild according to her sister who is primary informant during this encounter. Has not had a grand mal for years. Preictal symptom - stomach pain. Review of Systems No fevers, chills, sweats. No unintended weight loss. No abdominal pain, nausea, vomiting, diarrhea, constipation, bloody stools, black tarry stools. No rashes. No swollen glands. Prior to Visit Medications    Medication Sig Taking?  Authorizing Provider zoster recombinant adjuvanted vaccine [facility]) 50 MCG/0.5ML SUSR injection Inject 0.5 mLs into the muscle See Admin Instructions 1 dose now and repeat in 2-6 months Yes Mercedes Mahmood MD   levothyroxine (SYNTHROID) 75 MCG tablet Take 1 tablet by mouth daily Yes Mercedes Mahmood MD   BRIVIACT 50 MG TABS 100 mg.  Take 100mg QAM and 200mg QPM Yes Historical Provider, MD   cloBAZam (ONFI) 10 MG TABS tablet TAKE ONE TABLET BY MOUTH EVERY 12 HOURS Yes Historical Provider, MD   vitamin D (CHOLECALCIFEROL) 5000 units CAPS capsule Take 1 capsule by mouth daily Yes Prabhjot Castillo DO   acetaminophen (TYLENOL) 325 MG tablet Take 2 tablets by mouth every 6 hours as needed for Pain or Fever Yes Angie Nelson MD   risperiDONE (RISPERDAL) 3 MG tablet Take 3 mg by mouth 2 times daily Yes Historical Provider, MD   topiramate (TOPAMAX) 200 MG tablet Take 200 mg by mouth 2 times daily Yes Historical Provider, MD       Social History     Tobacco Use    Smoking status: Never Smoker    Smokeless tobacco: Never Used   Substance Use Topics    Alcohol use: No    Drug use: No        No Known Allergies,   Past Medical History:   Diagnosis Date    Acquired hypothyroidism     Arthritis     Bacteremia due to Gram-negative bacteria 12/11/2016    Mental retardation, mild (I.Q. 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 classified elsewhere     Seizures ([facility] [address]    ,   Past Surgical History:   Procedure Laterality Date    FRACTURE SURGERY      ORTHOPEDIC SURGERY Bilateral     ankle    TUBAL LIGATION     ,   Social History     Tobacco Use    Smoking status: Never Smoker    Smokeless tobacco: Never Used   Substance Use Topics    Alcohol use: No    Drug use: No   ,   Family History   Problem Relation Age of Onset    Stroke Mother     Thyroid Disease Mother     Diabetes Sister     Diabetes Brother     Hypertension Father     Stroke Father         ICH       PHYSICAL EXAMINATION: [ INSTRUCTIONS:  \"[x]\" Indicates a positive item  \"[]\" Indicates a negative item  -- DELETE ALL ITEMS NOT EXAMINED]  Vital Signs: unavailable    Patient appears to be alert and oriented to person, place, time, situation and is in no acute distress. Respiratory effort appears normal. Mood appears stable and speech and thought are grossly normal.    ASSESSMENT/PLAN:  Dylan Abraham was seen today for 6 month follow-up. Diagnoses and all orders for this visit:    Acquired hypothyroidism  Comments:  Reviewed proper administration of Synthroid which, it was explained, is a chronic, lifelong med. Orders:  -     levothyroxine (SYNTHROID) 75 MCG tablet; Take 1 tablet by mouth daily    Seizure disorder ([address]  Comments:  stable and fairly well-controlled on current meds. Managed by Dr. Yana Francois    Vitamin D deficiency  Comments:  WIll continue to follow. Take with meals. Orders:  -     Vitamin D 25 Hydroxy; Future    Encounter for screening mammogram for breast cancer  -     JIMENA DIGITAL SCREEN W OR WO CAD BILATERAL; Future    Need for shingles vaccine  -     zoster recombinant adjuvanted vaccine [facility]) 50 MCG/0.5ML SUSR injection; Inject 0.5 mLs into the muscle See Admin Instructions 1 dose now and repeat in 2-6 months          Return in about 6 months (around 6/21/2021) for Thyroid, seizure d/o - OV. Barbara Roldan is a 63 y.o. female being evaluated by a Virtual Visit (telephonic visit) encounter to address concerns as mentioned above. A caregiver was present when appropriate. Due to this being a TeleHealth encounter (During JRBDV-53 public health emergency), evaluation of the following organ systems was limited: Vitals/Constitutional/EENT/Resp/CV/GI//MS/Neuro/Skin/Heme-Lymph-Imm. Pursuant to the emergency declaration under the [address] [place] [address] and the [employer] and [employer] Act, this Virtual Visit was conducted with patient's (and/or legal guardian's) consent, to reduce the patient's risk of exposure to COVID-19 and provide necessary medical care. The patient (and/or legal guardian) has also been advised to contact this office for worsening conditions or problems, and seek emergency medical treatment and/or call 911 if deemed necessary. Services were provided through a telephonic synchronous discussion virtually to substitute for in-person clinic visit. Patient and provider were located at their individual homes. --Delmis Rivera MD on 12/21/2020 at 1:41 PM    An electronic signature was used to authenticate this note.

## 2021-02-12 ENCOUNTER — NURSE ONLY (OUTPATIENT)
Dept: PRIMARY CARE CLINIC | Age: 56
End: 2021-02-12

## 2021-02-12 ENCOUNTER — VIRTUAL VISIT (OUTPATIENT)
Dept: FAMILY MEDICINE CLINIC | Age: 56
End: 2021-02-12
Payer: MEDICARE

## 2021-02-12 DIAGNOSIS — Z20.822 ENCOUNTER BY TELEHEALTH FOR SUSPECTED COVID-19: ICD-10-CM

## 2021-02-12 DIAGNOSIS — Z20.822 ENCOUNTER BY TELEHEALTH FOR SUSPECTED COVID-19: Primary | ICD-10-CM

## 2021-02-12 PROCEDURE — 99442 PR PHYS/QHP TELEPHONE EVALUATION 11-20 MIN: CPT | Performed by: PHYSICIAN ASSISTANT

## 2021-02-12 ASSESSMENT — ENCOUNTER SYMPTOMS
SHORTNESS OF BREATH: 0
SORE THROAT: 0
VOMITING: 0
CHEST TIGHTNESS: 0
SINUS PAIN: 0
NAUSEA: 0
BACK PAIN: 0
COUGH: 0
DIARRHEA: 0
SINUS PRESSURE: 0
ABDOMINAL PAIN: 0

## 2021-02-12 NOTE — PROGRESS NOTES
2021    TELEHEALTH EVALUATION -- Audio/Visual (During OBPTO-21 public health emergency)    Due to [place] 19 outbreak, patient's office visit was converted to a virtual visit. Patient was contacted and agreed to proceed with a virtual visit via Telephone Visit  The risks and benefits of converting to a virtual visit were discussed in light of the current infectious disease epidemic. Patient also understood that insurance coverage and co-pays are up to their individual insurance plans. HPI:    Arcenio Gaviria (:  1965) has requested an audio/video evaluation for the following concern(s):    VV via audio for concerns for COVID-19 after exposure. Not feeling sick  Eating and drinking  No loss of smell or taste. No fever, sob, chills, abdominal pain, or HA    Review of Systems   Constitutional: Negative for activity change, appetite change, chills and fever. HENT: Negative for congestion, drooling, sinus pressure, sinus pain and sore throat. Eyes: Negative for visual disturbance. Respiratory: Negative for cough, chest tightness and shortness of breath. Cardiovascular: Negative for chest pain. Gastrointestinal: Negative for abdominal pain, diarrhea, nausea and vomiting. Endocrine: Negative for cold intolerance. Genitourinary: Negative for dysuria, flank pain, frequency and hematuria. Musculoskeletal: Negative for arthralgias and back pain. Skin: Negative for rash. Allergic/Immunologic: Negative for food allergies. Neurological: Negative for weakness, light-headedness, numbness and headaches. Hematological: Does not bruise/bleed easily. Prior to Visit Medications    Medication Sig Taking?  Authorizing Provider   zoster recombinant adjuvanted vaccine [facility]) 50 MCG/0.5ML SUSR injection Inject 0.5 mLs into the muscle See Admin Instructions 1 dose now and repeat in 2-6 months  Lori Duval MD levothyroxine (SYNTHROID) 75 MCG tablet Take 1 tablet by mouth daily  Hayden Viveros MD   BRIVIACT 50 MG TABS 100 mg. Take 100mg QAM and 200mg QPM  Historical Provider, MD   cloBAZam (ONFI) 10 MG TABS tablet TAKE ONE TABLET BY MOUTH EVERY 12 HOURS  Historical Provider, MD   vitamin D (CHOLECALCIFEROL) 5000 units CAPS capsule Take 1 capsule by mouth daily  Beatriz Max DO   acetaminophen (TYLENOL) 325 MG tablet Take 2 tablets by mouth every 6 hours as needed for Pain or Fever  Veronica Schwab MD   risperiDONE (RISPERDAL) 3 MG tablet Take 3 mg by mouth 2 times daily  Historical Provider, MD   topiramate (TOPAMAX) 200 MG tablet Take 200 mg by mouth 2 times daily  Historical Provider, MD       Social History     Tobacco Use    Smoking status: Never Smoker    Smokeless tobacco: Never Used   Substance Use Topics    Alcohol use: No    Drug use: No            PHYSICAL EXAMINATION:  [ INSTRUCTIONS:  \"[x]\" Indicates a positive item  \"[]\" Indicates a negative item  -- DELETE ALL ITEMS NOT EXAMINED]    Patient was responding to all questions appropriately without dyspnea. No coughs or congestion noted during the conversation. No audible wheezing or adventitious breath sounds. Patient thoughts and judgement is intact. Due to this being a TeleHealth encounter, evaluation of the following organ systems is limited: Vitals/Constitutional/EENT/Resp/CV/GI//MS/Neuro/Skin/Heme-Lymph-Imm. ASSESSMENT/PLAN:  1. Encounter by telehealth for suspected COVID-19  -The patient was advised to remain isolated after their COVID-19 test pending their results.  The patient was informed that the results will take 2-3 days and that someone will contact the patient of their results.   - We'll call with COVID-19 results  Results will also be available on your [facility] account, if activated    Things to Know:     - Do not leave home except to get medical care while waiting for your results - Testing does not change treatment- there is no medication that treats COVID-19  - Stay well-hydrated, rest as needed  - May take over the counter medicine acetaminophen (aka Tylenol) for pain or fever if needed according to the directions on the box if tolerated no allergies  - Monitor your symptoms + contact a medical provider if symptoms are worsening- such as difficulty breathing  - Follow social distancing guidelines and wear a face mask when leaving home is necessary  - Symptoms may develop 2 days to 2 weeks following exposure to the virus- if you are exposed after testing, or if you were in the incubation period when tested, you could become ill with COVID-19 later    Seek emergency medical care immediately (ER/ call 911) if you have trouble breathing, persistent pain or pressure in the chest, new confusion, inability to wake or stay awake, bluish lips or face, or any other signs/ symptoms that you think could be an emergency. Return if symptoms worsen or fail to improve. On this date 02/12/21 I have spent 15 minutes reviewing previous notes, test results and face to face with the patient discussing the diagnosis and importance of compliance with the treatment plan as well as documenting on the day of the visit. An  electronic signature was used to authenticate this note. --ZHOU Weiss on 2/12/2021 at 3:11 PM    1    Pursuant to the emergency declaration under the [facility], [facility] waiver authority and the [employer] and [employer] Act, this Virtual  Visit was conducted, with patient's consent, to reduce the patient's risk of exposure to COVID-19 and provide continuity of care for an established patient. Services were provided through a video synchronous discussion virtually to substitute for in-person clinic visit.

## 2021-02-13 LAB
SARS-COV-2: NOT DETECTED
SOURCE: NORMAL

## 2021-03-29 ENCOUNTER — APPOINTMENT (OUTPATIENT)
Dept: GENERAL RADIOLOGY | Age: 56
End: 2021-03-29
Payer: MEDICARE

## 2021-03-29 ENCOUNTER — HOSPITAL ENCOUNTER (EMERGENCY)
Age: 56
Discharge: HOME | End: 2021-03-29
Payer: MEDICARE

## 2021-03-29 VITALS
SYSTOLIC BLOOD PRESSURE: 99 MMHG | BODY MASS INDEX: 25.61 KG/M2 | RESPIRATION RATE: 18 BRPM | TEMPERATURE: 100.7 F | HEART RATE: 80 BPM | DIASTOLIC BLOOD PRESSURE: 68 MMHG | WEIGHT: 150 LBS | OXYGEN SATURATION: 97 % | HEIGHT: 64 IN

## 2021-03-29 DIAGNOSIS — U07.1 COVID-19: Primary | ICD-10-CM

## 2021-03-29 LAB
ALBUMIN SERPL-MCNC: 3.1 G/DL (ref 3.5–4.6)
ALP BLD-CCNC: 76 U/L (ref 40–130)
ALT SERPL-CCNC: 21 U/L (ref 0–33)
ANION GAP SERPL CALCULATED.3IONS-SCNC: 7 MEQ/L (ref 9–15)
AST SERPL-CCNC: 43 U/L (ref 0–35)
BANDED NEUTROPHILS RELATIVE PERCENT: 3 % (ref 5–11)
BASOPHILS ABSOLUTE: 0 K/UL (ref 0–0.2)
BASOPHILS RELATIVE PERCENT: 0.5 %
BILIRUB SERPL-MCNC: <0.2 MG/DL (ref 0.2–0.7)
BUN BLDV-MCNC: 9 MG/DL (ref 6–20)
CALCIUM SERPL-MCNC: 7.3 MG/DL (ref 8.5–9.9)
CHLORIDE BLD-SCNC: 113 MEQ/L (ref 95–107)
CO2: 18 MEQ/L (ref 20–31)
CREAT SERPL-MCNC: 0.66 MG/DL (ref 0.5–0.9)
EOSINOPHILS ABSOLUTE: 0 K/UL (ref 0–0.7)
EOSINOPHILS RELATIVE PERCENT: 0 %
GFR AFRICAN AMERICAN: >60
GFR NON-AFRICAN AMERICAN: >60
GLOBULIN: 2.6 G/DL (ref 2.3–3.5)
GLUCOSE BLD-MCNC: 87 MG/DL (ref 70–99)
HCT VFR BLD CALC: 32.6 % (ref 37–47)
HEMOGLOBIN: 10.8 G/DL (ref 12–16)
LYMPHOCYTES ABSOLUTE: 0.6 K/UL (ref 1–4.8)
LYMPHOCYTES RELATIVE PERCENT: 17 %
MCH RBC QN AUTO: 31.9 PG (ref 27–31.3)
MCHC RBC AUTO-ENTMCNC: 33.2 % (ref 33–37)
MCV RBC AUTO: 96.3 FL (ref 82–100)
MONOCYTES ABSOLUTE: 0.3 K/UL (ref 0.2–0.8)
MONOCYTES RELATIVE PERCENT: 6.8 %
NEUTROPHILS ABSOLUTE: 2.8 K/UL (ref 1.4–6.5)
NEUTROPHILS RELATIVE PERCENT: 74 %
OVALOCYTES: ABNORMAL
PDW BLD-RTO: 13.5 % (ref 11.5–14.5)
PLATELET # BLD: 160 K/UL (ref 130–400)
PLATELET SLIDE REVIEW: NORMAL
POIKILOCYTES: ABNORMAL
POTASSIUM SERPL-SCNC: 3.2 MEQ/L (ref 3.4–4.9)
PROCALCITONIN: 0.12 NG/ML (ref 0–0.15)
RBC # BLD: 3.39 M/UL (ref 4.2–5.4)
SARS-COV-2, NAAT: DETECTED
SODIUM BLD-SCNC: 138 MEQ/L (ref 135–144)
TOTAL PROTEIN: 5.7 G/DL (ref 6.3–8)
TSH SERPL DL<=0.05 MIU/L-ACNC: 0.15 UIU/ML (ref 0.44–3.86)
WBC # BLD: 3.6 K/UL (ref 4.8–10.8)

## 2021-03-29 PROCEDURE — 84145 PROCALCITONIN (PCT): CPT

## 2021-03-29 PROCEDURE — 6360000002 HC RX W HCPCS: Performed by: PHYSICIAN ASSISTANT

## 2021-03-29 PROCEDURE — 96374 THER/PROPH/DIAG INJ IV PUSH: CPT

## 2021-03-29 PROCEDURE — 80053 COMPREHEN METABOLIC PANEL: CPT

## 2021-03-29 PROCEDURE — 71045 X-RAY EXAM CHEST 1 VIEW: CPT

## 2021-03-29 PROCEDURE — 2580000003 HC RX 258: Performed by: PHYSICIAN ASSISTANT

## 2021-03-29 PROCEDURE — 36415 COLL VENOUS BLD VENIPUNCTURE: CPT

## 2021-03-29 PROCEDURE — 85025 COMPLETE CBC W/AUTO DIFF WBC: CPT

## 2021-03-29 PROCEDURE — 84443 ASSAY THYROID STIM HORMONE: CPT

## 2021-03-29 PROCEDURE — 87635 SARS-COV-2 COVID-19 AMP PRB: CPT

## 2021-03-29 PROCEDURE — 99284 EMERGENCY DEPT VISIT MOD MDM: CPT

## 2021-03-29 RX ORDER — 0.9 % SODIUM CHLORIDE 0.9 %
1000 INTRAVENOUS SOLUTION INTRAVENOUS ONCE
Status: COMPLETED | OUTPATIENT
Start: 2021-03-29 | End: 2021-03-29

## 2021-03-29 RX ORDER — KETOROLAC TROMETHAMINE 15 MG/ML
15 INJECTION, SOLUTION INTRAMUSCULAR; INTRAVENOUS ONCE
Status: COMPLETED | OUTPATIENT
Start: 2021-03-29 | End: 2021-03-29

## 2021-03-29 RX ADMIN — KETOROLAC TROMETHAMINE 15 MG: 15 INJECTION, SOLUTION INTRAMUSCULAR; INTRAVENOUS at 08:17

## 2021-03-29 RX ADMIN — SODIUM CHLORIDE 1000 ML: 9 INJECTION, SOLUTION INTRAVENOUS at 08:17

## 2021-03-29 ASSESSMENT — ENCOUNTER SYMPTOMS
COUGH: 1
SORE THROAT: 0
EYE DISCHARGE: 0
ABDOMINAL DISTENTION: 0
COLOR CHANGE: 0
CONSTIPATION: 0
SHORTNESS OF BREATH: 0
WHEEZING: 0
ABDOMINAL PAIN: 0
RHINORRHEA: 0
STRIDOR: 0

## 2021-03-29 ASSESSMENT — PAIN DESCRIPTION - LOCATION: LOCATION: HEAD

## 2021-03-29 ASSESSMENT — PAIN SCALES - GENERAL: PAINLEVEL_OUTOF10: 10

## 2021-03-29 NOTE — ED PROVIDER NOTES
[facility] ED  eMERGENCY dEPARTMENT eNCOUnter      Pt Name: Etta Preston  MRN: [id number]  [place] 1965  Date of evaluation: 3/29/2021  Provider: Tommy Teran PA-C    CHIEF COMPLAINT       Chief Complaint   Patient presents with    Fatigue    Headache         HISTORY OF PRESENT ILLNESS   (Location/Symptom, Timing/Onset,Context/Setting, Quality, Duration, Modifying Factors, Severity)  Note limiting factors. Etta Preston is a 63 y.o. female who presents to the emergency department complaint of fatigue, headache, cough, sneezing, which patient states been ongoing for the last 3 days since she received her COVID-19 shot. She denies any shortness of breath, no fever, no nausea vomiting, no dizziness, no new complaint, no constipation or diarrhea. She has been eating and drinking as normal.  Past medical history significant for hypothyroidism, arthritis, MR.  Seizures    HPI    NursingNotes were reviewed. REVIEW OF SYSTEMS    (2-9 systems for level 4, 10 or more for level 5)     Review of Systems   Constitutional: Positive for fatigue. Negative for activity change and appetite change. HENT: Positive for sneezing. Negative for congestion, ear discharge, ear pain, nosebleeds, rhinorrhea and sore throat. Eyes: Negative for discharge. Respiratory: Positive for cough. Negative for shortness of breath, wheezing and stridor. Cardiovascular: Negative for chest pain, palpitations and leg swelling. Gastrointestinal: Negative for abdominal distention, abdominal pain and constipation. Genitourinary: Negative for difficulty urinating and dysuria. Musculoskeletal: Negative for arthralgias. Skin: Negative for color change and rash. Neurological: Positive for weakness and headaches. Negative for dizziness, syncope and numbness. Psychiatric/Behavioral: Negative for agitation and confusion.        Except as noted above the remainder of the review of systems was reviewed and negative. PAST MEDICAL HISTORY     Past Medical History:   Diagnosis Date    Acquired hypothyroidism     Arthritis     Bacteremia due to Gram-negative bacteria 12/11/2016    Mental retardation, mild (I.Q. 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 classified elsewhere     Seizures ([facility] [address]          SURGICALHISTORY       Past Surgical History:   Procedure Laterality Date    FRACTURE SURGERY      ORTHOPEDIC SURGERY Bilateral     ankle    TUBAL LIGATION           CURRENT MEDICATIONS       Discharge Medication List as of 3/29/2021 10:17 AM      CONTINUE these medications which have NOT CHANGED    Details   zoster recombinant adjuvanted vaccine (SHINGRIX) 50 MCG/0.5ML SUSR injection Inject 0.5 mLs into the muscle See Admin Instructions 1 dose now and repeat in 2-6 months, Disp-0.5 mL, R-0Print      levothyroxine (SYNTHROID) 75 MCG tablet Take 1 tablet by mouth daily, Disp-90 tablet, R-4Normal      BRIVIACT 50 MG TABS 100 mg. Take 100mg QAM and 200mg QPM, R-3, DAWHistorical Med      cloBAZam (ONFI) 10 MG TABS tablet TAKE ONE TABLET BY MOUTH EVERY 12 HOURS, R-5Historical Med      vitamin D (CHOLECALCIFEROL) 5000 units CAPS capsule Take 1 capsule by mouth daily, Disp-30 capsule, R-1Print      acetaminophen (TYLENOL) 325 MG tablet Take 2 tablets by mouth every 6 hours as needed for Pain or Fever, Disp-120 tablet, R-3      risperiDONE (RISPERDAL) 3 MG tablet Take 3 mg by mouth 2 times daily      topiramate (TOPAMAX) 200 MG tablet Take 200 mg by mouth 2 times daily             ALLERGIES     Patient has no known allergies.     FAMILY HISTORY       Family History   Problem Relation Age of Onset    Stroke Mother     Thyroid Disease Mother     Diabetes Sister     Diabetes Brother     Hypertension Father     Stroke Father         ICH          SOCIAL HISTORY       Social History     Socioeconomic History    Marital status: Single     Spouse name: Not on file    Number of children: Not on file    Years of education: Not on file    Highest education level: Not on file   Occupational History    Not on file   Social Needs    Financial resource strain: Not on file    Food insecurity     Worry: Not on file     Inability: Not on file    Transportation needs     Medical: Not on file     Non-medical: Not on file   Tobacco Use    Smoking status: Never Smoker    Smokeless tobacco: Never Used   Substance and Sexual Activity    Alcohol use: No    Drug use: No    Sexual activity: Never   Lifestyle    Physical activity     Days per week: Not on file     Minutes per session: Not on file    Stress: Not on file   Relationships    Social connections     Talks on phone: Not on file     Gets together: Not on file     Attends Yarsani service: Not on file     Active member of club or organization: Not on file     Attends meetings of clubs or organizations: Not on file     Relationship status: Not on file    Intimate partner violence     Fear of current or ex partner: Not on file     Emotionally abused: Not on file     Physically abused: Not on file     Forced sexual activity: Not on file   Other Topics Concern    Not on file   Social History Narrative    Not on file       SCREENINGS      @[facility]([id number])@      PHYSICAL EXAM    (up to 7 for level 4, 8 or more for level 5)     ED Triage Vitals [03/29/21 0657]   BP Temp Temp src Pulse Resp SpO2 Height Weight   96/67 100.7 °F (38.2 °C) -- 78 20 97 % 5' 4\" (1.626 m) 150 lb (68 kg)       Physical Exam  Vitals signs and nursing note reviewed. Constitutional:       General: She is not in acute distress. Appearance: She is well-developed. She is not ill-appearing, toxic-appearing or diaphoretic. HENT:      Head: Normocephalic. Right Ear: Tympanic membrane normal.      Left Ear: Tympanic membrane normal.      Nose: Nose normal. No congestion.       Mouth/Throat:      Mouth: Mucous membranes are moist.      Pharynx: No oropharyngeal exudate or posterior oropharyngeal erythema. Eyes:      Extraocular Movements: Extraocular movements intact. Conjunctiva/sclera: Conjunctivae normal.      Pupils: Pupils are equal, round, and reactive to light. Neck:      Musculoskeletal: Normal range of motion and neck supple. No neck rigidity. Vascular: No JVD. Trachea: No tracheal deviation. Cardiovascular:      Rate and Rhythm: Normal rate. Pulses: Normal pulses. Heart sounds: Normal heart sounds. No murmur. No friction rub. No gallop. Pulmonary:      Effort: Pulmonary effort is normal. No tachypnea, accessory muscle usage, respiratory distress or retractions. Breath sounds: Normal breath sounds. No stridor. No wheezing, rhonchi or rales. Comments: Lung sounds are clear in all fields, there is no wheezes rales or rhonchi, there is no accessory muscle use, no retractions, room air saturations are at 87%. Chest:      Chest wall: No tenderness. Abdominal:      General: Abdomen is flat. Bowel sounds are normal. There is no distension or abdominal bruit. Palpations: There is no shifting dullness, fluid wave, hepatomegaly, splenomegaly, mass or pulsatile mass. Tenderness: There is no abdominal tenderness. There is no right CVA tenderness, left CVA tenderness, guarding or rebound. Negative signs include Quintana's sign, Rovsing's sign and McBurney's sign. Musculoskeletal: Normal range of motion. General: No deformity. Skin:     General: Skin is warm and dry. Capillary Refill: Capillary refill takes less than 2 seconds. Coloration: Skin is not jaundiced. Neurological:      General: No focal deficit present. Mental Status: She is alert and oriented to person, place, and time. Mental status is at baseline. Cranial Nerves: No cranial nerve deficit. Sensory: No sensory deficit. Motor: No weakness.       Coordination: Coordination normal.   Psychiatric:         Mood and Affect: Mood normal. DIAGNOSTIC RESULTS     EKG: All EKG's are interpreted by the Emergency Department Physician who either signs or Co-signsthis chart in the absence of a cardiologist.        RADIOLOGY:   Rui Car such as CT, Ultrasound and MRI are read by the radiologist. Ron Meneses radiographic images are visualized and preliminarily interpreted by the emergency physician with the below findings:        Interpretation per the Radiologist below, if available at the time ofthis note:    XR CHEST PORTABLE   Final Result   PATCHY BIBASILAR INFILTRATES. POSSIBLE VIRAL PNEUMONIA. ED BEDSIDE ULTRASOUND:   Performed by ED Physician - none    LABS:  Labs Reviewed   COVID-19, RAPID - Abnormal; Notable for the following components:       Result Value    SARS-CoV-2, NAAT DETECTED (*)     All other components within normal limits    Narrative:     Glo Nicole tel. [phone],  COVID results called to and read back by María Elena Dodson, 03/29/2021 07:23,  by [facility]   COMPREHENSIVE METABOLIC PANEL - Abnormal; Notable for the following components:    Potassium 3.2 (*)     Chloride 113 (*)     CO2 18 (*)     Anion Gap 7 (*)     Calcium 7.3 (*)     Total Protein 5.7 (*)     Albumin 3.1 (*)     AST 43 (*)     All other components within normal limits   CBC WITH AUTO DIFFERENTIAL - Abnormal; Notable for the following components:    WBC 3.6 (*)     RBC 3.39 (*)     Hemoglobin 10.8 (*)     Hematocrit 32.6 (*)     MCH 31.9 (*)     Lymphocytes Absolute 0.6 (*)     Bands Relative 3 (*)     All other components within normal limits   TSH WITHOUT REFLEX - Abnormal; Notable for the following components:    TSH 0.152 (*)     All other components within normal limits   PROCALCITONIN       All other labs were within normal range or not returned as of this dictation.     EMERGENCY DEPARTMENT COURSE and DIFFERENTIAL DIAGNOSIS/MDM:   Vitals:    Vitals:    03/29/21 0657 03/29/21 0928 03/29/21 1101   BP: 96/67 102/67 99/68   Pulse: 78 77 80   Resp: 20 20 18   Temp: 100.7 °F (38.2 °C)     SpO2: 97% 97% 97%   Weight: 150 lb (68 kg)     Height: 5' 4\" (1.626 m)              MDM  Number of Diagnoses or Management Options  COVID-19  Diagnosis management comments: Cannot presented to ED with complaint of cough, sneezing, which she states been ongoing for last 3 days, she has other family members within her home who recently tested positive for COVID-19. Patient states that she had recently been vaccinated within the last couple of days as well. She is Covid 19+ on examination today. She appears in no acute distress, she has no accessory muscle use, room air saturations are 97%, she does have a low-grade fever 100.7. She she is eating and drinking well, she has no urinary complaints, no constipation or diarrhea. Chest x-ray shows some infiltrates within the lung bases, which could be consistent with that of viral illness, her pro calcitonin level is low at this time of 0.12, leading more suspicion to that of viral etiology. Family was notified, advised if patient has any worsening of symptoms, worsening shortness of breath, cough, fevers, she should return to the ER immediately for reevaluation. She is otherwise advised to contact her family doctor for follow-up. CRITICAL CARE TIME   Total Critical Care time was 0 minutes, excluding separately reportableprocedures. There was a high probability of clinicallysignificant/life threatening deterioration in the patient's condition which required my urgent intervention.      CONSULTS:  None    PROCEDURES:  Unless otherwise noted below, none     Procedures    FINAL IMPRESSION      1. COVID-19          DISPOSITION/PLAN   DISPOSITION Decision To Discharge 03/29/2021 10:17:06 AM      PATIENT [facility] TO:  [facility] [address]   [phone]    In 3 days        DISCHARGE MEDICATIONS:  Discharge Medication List as of 3/29/2021 10:17 AM             (Please note that portions of this note were completed with a voice recognition program.  Efforts were made to edit the dictations but occasionally words are mis-transcribed.)    Roberto Jackson PA-C (electronically signed)  Attending Emergency Physician         Roberto Jackson PA-C  03/30/21 7440

## 2021-03-29 NOTE — ED NOTES
Pt is ready for discharge and family was called to . Pt's sister stated that the entire family has Covid-19 and there isn't anyone available to pick her up and requested the pt be taken home. [facility] was called and advised they will send a wheelchair Lavelle Brown to transport the pt home.      Susan Sotomayor RN  03/29/21 [id number]

## 2021-03-29 NOTE — ED NOTES
Squad here to transport pt home, pt's sister was called and informed.      Judy Harris RN  03/29/21 [id number]

## 2021-03-30 ENCOUNTER — CARE COORDINATION (OUTPATIENT)
Dept: CARE COORDINATION | Age: 56
End: 2021-03-30

## 2021-03-30 NOTE — CARE COORDINATION
Date/Time:  3/30/2021 11:07 AM  Attempted to reach patient by telephone. Call within 2 business days of discharge: Yes   I left HIPPA compliant message requesting a return call. Will attempt to reach patient again.

## 2021-03-31 ENCOUNTER — HOSPITAL ENCOUNTER (EMERGENCY)
Age: 56
Discharge: HOME | End: 2021-03-31
Attending: EMERGENCY MEDICINE
Payer: MEDICARE

## 2021-03-31 ENCOUNTER — CARE COORDINATION (OUTPATIENT)
Dept: CARE COORDINATION | Age: 56
End: 2021-03-31

## 2021-03-31 ENCOUNTER — VIRTUAL VISIT (OUTPATIENT)
Dept: FAMILY MEDICINE CLINIC | Age: 56
End: 2021-03-31
Payer: MEDICARE

## 2021-03-31 ENCOUNTER — APPOINTMENT (OUTPATIENT)
Dept: GENERAL RADIOLOGY | Age: 56
End: 2021-03-31
Payer: MEDICARE

## 2021-03-31 VITALS
HEIGHT: 63 IN | DIASTOLIC BLOOD PRESSURE: 61 MMHG | OXYGEN SATURATION: 98 % | HEART RATE: 73 BPM | BODY MASS INDEX: 26.58 KG/M2 | WEIGHT: 150 LBS | RESPIRATION RATE: 16 BRPM | SYSTOLIC BLOOD PRESSURE: 101 MMHG | TEMPERATURE: 100.2 F

## 2021-03-31 DIAGNOSIS — U07.1 COVID-19: Primary | ICD-10-CM

## 2021-03-31 DIAGNOSIS — U07.1 COVID-19: ICD-10-CM

## 2021-03-31 DIAGNOSIS — J18.9 PNEUMONIA DUE TO ORGANISM: Primary | ICD-10-CM

## 2021-03-31 LAB
ALBUMIN SERPL-MCNC: 3.6 G/DL (ref 3.5–4.6)
ALP BLD-CCNC: 80 U/L (ref 40–130)
ALT SERPL-CCNC: 36 U/L (ref 0–33)
ANION GAP SERPL CALCULATED.3IONS-SCNC: 11 MEQ/L (ref 9–15)
AST SERPL-CCNC: 56 U/L (ref 0–35)
BACTERIA: NEGATIVE /HPF
BASOPHILS ABSOLUTE: 0 K/UL (ref 0–0.2)
BASOPHILS RELATIVE PERCENT: 0.3 %
BILIRUB SERPL-MCNC: 0.3 MG/DL (ref 0.2–0.7)
BILIRUBIN URINE: NEGATIVE
BLOOD, URINE: NEGATIVE
BUN BLDV-MCNC: 6 MG/DL (ref 6–20)
CALCIUM SERPL-MCNC: 8.7 MG/DL (ref 8.5–9.9)
CHLORIDE BLD-SCNC: 107 MEQ/L (ref 95–107)
CLARITY: ABNORMAL
CO2: 18 MEQ/L (ref 20–31)
COLOR: YELLOW
CREAT SERPL-MCNC: 0.72 MG/DL (ref 0.5–0.9)
EOSINOPHILS ABSOLUTE: 0 K/UL (ref 0–0.7)
EOSINOPHILS RELATIVE PERCENT: 0.1 %
EPITHELIAL CELLS, UA: NORMAL /HPF (ref 0–5)
GFR AFRICAN AMERICAN: >60
GFR NON-AFRICAN AMERICAN: >60
GLOBULIN: 3.2 G/DL (ref 2.3–3.5)
GLUCOSE BLD-MCNC: 94 MG/DL (ref 70–99)
GLUCOSE URINE: NEGATIVE MG/DL
HCT VFR BLD CALC: 36.9 % (ref 37–47)
HEMOGLOBIN: 12.6 G/DL (ref 12–16)
HYALINE CASTS: NORMAL /HPF (ref 0–5)
KETONES, URINE: NEGATIVE MG/DL
LACTIC ACID: 1.2 MMOL/L (ref 0.5–2.2)
LEUKOCYTE ESTERASE, URINE: NEGATIVE
LYMPHOCYTES ABSOLUTE: 0.7 K/UL (ref 1–4.8)
LYMPHOCYTES RELATIVE PERCENT: 13.3 %
MAGNESIUM: 2.1 MG/DL (ref 1.7–2.4)
MCH RBC QN AUTO: 32.2 PG (ref 27–31.3)
MCHC RBC AUTO-ENTMCNC: 34 % (ref 33–37)
MCV RBC AUTO: 94.6 FL (ref 82–100)
MONOCYTES ABSOLUTE: 0.4 K/UL (ref 0.2–0.8)
MONOCYTES RELATIVE PERCENT: 7.5 %
NEUTROPHILS ABSOLUTE: 3.9 K/UL (ref 1.4–6.5)
NEUTROPHILS RELATIVE PERCENT: 78.8 %
NITRITE, URINE: NEGATIVE
PDW BLD-RTO: 13.9 % (ref 11.5–14.5)
PH UA: 8 (ref 5–9)
PLATELET # BLD: 218 K/UL (ref 130–400)
POTASSIUM SERPL-SCNC: 3.4 MEQ/L (ref 3.4–4.9)
PROTEIN UA: 30 MG/DL
RBC # BLD: 3.9 M/UL (ref 4.2–5.4)
RBC UA: NORMAL /HPF (ref 0–5)
SODIUM BLD-SCNC: 136 MEQ/L (ref 135–144)
SPECIFIC GRAVITY UA: 1.02 (ref 1–1.03)
TOTAL PROTEIN: 6.8 G/DL (ref 6.3–8)
URINE REFLEX TO CULTURE: ABNORMAL
UROBILINOGEN, URINE: 1 E.U./DL
WBC # BLD: 4.9 K/UL (ref 4.8–10.8)
WBC UA: NORMAL /HPF (ref 0–5)

## 2021-03-31 PROCEDURE — G2025 DIS SITE TELE SVCS RHC/FQHC: HCPCS | Performed by: NURSE PRACTITIONER

## 2021-03-31 PROCEDURE — 36415 COLL VENOUS BLD VENIPUNCTURE: CPT

## 2021-03-31 PROCEDURE — 6370000000 HC RX 637 (ALT 250 FOR IP): Performed by: EMERGENCY MEDICINE

## 2021-03-31 PROCEDURE — 80053 COMPREHEN METABOLIC PANEL: CPT

## 2021-03-31 PROCEDURE — 83735 ASSAY OF MAGNESIUM: CPT

## 2021-03-31 PROCEDURE — 2580000003 HC RX 258: Performed by: EMERGENCY MEDICINE

## 2021-03-31 PROCEDURE — 96365 THER/PROPH/DIAG IV INF INIT: CPT

## 2021-03-31 PROCEDURE — 87040 BLOOD CULTURE FOR BACTERIA: CPT

## 2021-03-31 PROCEDURE — 96375 TX/PRO/DX INJ NEW DRUG ADDON: CPT

## 2021-03-31 PROCEDURE — 6360000002 HC RX W HCPCS: Performed by: EMERGENCY MEDICINE

## 2021-03-31 PROCEDURE — 83605 ASSAY OF LACTIC ACID: CPT

## 2021-03-31 PROCEDURE — 85025 COMPLETE CBC W/AUTO DIFF WBC: CPT

## 2021-03-31 PROCEDURE — 99284 EMERGENCY DEPT VISIT MOD MDM: CPT

## 2021-03-31 PROCEDURE — 71045 X-RAY EXAM CHEST 1 VIEW: CPT

## 2021-03-31 PROCEDURE — 81001 URINALYSIS AUTO W/SCOPE: CPT

## 2021-03-31 RX ORDER — AZITHROMYCIN 250 MG/1
TABLET, FILM COATED ORAL
Qty: 1 PACKET | Refills: 0 | Status: SHIPPED | OUTPATIENT
Start: 2021-03-31 | End: 2021-04-04

## 2021-03-31 RX ORDER — PREDNISONE 20 MG/1
40 TABLET ORAL DAILY
Qty: 10 TABLET | Refills: 0 | Status: SHIPPED | OUTPATIENT
Start: 2021-03-31 | End: 2021-04-05

## 2021-03-31 RX ORDER — DEXAMETHASONE SODIUM PHOSPHATE 10 MG/ML
6 INJECTION INTRAMUSCULAR; INTRAVENOUS ONCE
Status: COMPLETED | OUTPATIENT
Start: 2021-03-31 | End: 2021-03-31

## 2021-03-31 RX ORDER — 0.9 % SODIUM CHLORIDE 0.9 %
1000 INTRAVENOUS SOLUTION INTRAVENOUS ONCE
Status: COMPLETED | OUTPATIENT
Start: 2021-03-31 | End: 2021-03-31

## 2021-03-31 RX ORDER — IBUPROFEN 800 MG/1
800 TABLET ORAL ONCE
Status: COMPLETED | OUTPATIENT
Start: 2021-03-31 | End: 2021-03-31

## 2021-03-31 RX ADMIN — IBUPROFEN 800 MG: 800 TABLET ORAL at 16:25

## 2021-03-31 RX ADMIN — SODIUM CHLORIDE 1000 ML: 9 INJECTION, SOLUTION INTRAVENOUS at 16:25

## 2021-03-31 RX ADMIN — AZITHROMYCIN 500 MG: 500 INJECTION, POWDER, LYOPHILIZED, FOR SOLUTION INTRAVENOUS at 17:04

## 2021-03-31 RX ADMIN — DEXAMETHASONE SODIUM PHOSPHATE 10 MG: 10 INJECTION INTRAMUSCULAR; INTRAVENOUS at 17:04

## 2021-03-31 SDOH — ECONOMIC STABILITY: TRANSPORTATION INSECURITY
IN THE PAST 12 MONTHS, HAS LACK OF TRANSPORTATION KEPT YOU FROM MEETINGS, WORK, OR FROM GETTING THINGS NEEDED FOR DAILY LIVING?: NO

## 2021-03-31 SDOH — ECONOMIC STABILITY: INCOME INSECURITY: HOW HARD IS IT FOR YOU TO PAY FOR THE VERY BASICS LIKE FOOD, HOUSING, MEDICAL CARE, AND HEATING?: NOT HARD AT ALL

## 2021-03-31 SDOH — ECONOMIC STABILITY: FOOD INSECURITY: WITHIN THE PAST 12 MONTHS, THE FOOD YOU BOUGHT JUST DIDN'T LAST AND YOU DIDN'T HAVE MONEY TO GET MORE.: NEVER TRUE

## 2021-03-31 SDOH — ECONOMIC STABILITY: TRANSPORTATION INSECURITY
IN THE PAST 12 MONTHS, HAS THE LACK OF TRANSPORTATION KEPT YOU FROM MEDICAL APPOINTMENTS OR FROM GETTING MEDICATIONS?: NO

## 2021-03-31 ASSESSMENT — PATIENT HEALTH QUESTIONNAIRE - PHQ9
SUM OF ALL RESPONSES TO PHQ QUESTIONS 1-9: 0
2. FEELING DOWN, DEPRESSED OR HOPELESS: 0
SUM OF ALL RESPONSES TO PHQ QUESTIONS 1-9: 0

## 2021-03-31 ASSESSMENT — PAIN SCALES - GENERAL: PAINLEVEL_OUTOF10: 4

## 2021-03-31 ASSESSMENT — ENCOUNTER SYMPTOMS: COUGH: 1

## 2021-03-31 ASSESSMENT — PAIN DESCRIPTION - LOCATION: LOCATION: BACK

## 2021-03-31 ASSESSMENT — PAIN DESCRIPTION - DESCRIPTORS: DESCRIPTORS: ACHING

## 2021-03-31 ASSESSMENT — PAIN SCALES - WONG BAKER: WONGBAKER_NUMERICALRESPONSE: 4

## 2021-03-31 NOTE — ED TRIAGE NOTES
Patient arrives to ED via EMS with C/O cough. Family called EMS d/t increasing lethargy and new incontinence after being diagnosed with covid 3days ago. Patient is MRDD and sister/care giver called saying her condition was worse. EMS crew that picked up patient was same crew that picked her up 3days ago and says she looks markedly better from 3days ago. Patient seems tearful because her sister is in the hospital with covid. Respirations even and unlabored. Skin p/w/d.

## 2021-03-31 NOTE — CARE COORDINATION
Phone call from atients sister- Lorene June, she states they just had telephone virtual visit with Cody Samuel and she recommended that Leon Cardozo be re-evalutated in ER. She has called ambulance for transport to ER, she can not go with her because she is also covid positive, she requests that when Leon Cardozo arrives she be called to provide any information they need, as Leon Cardozo is mentally challenged, I spoke with ER Coordinator -Maricel Henderson and gave her contact information for Lorene June and requested she be called when pt arrives.

## 2021-03-31 NOTE — ED PROVIDER NOTES
[facility] ED  EMERGENCY DEPARTMENT ENCOUNTER      Pt Name: Jim Daniels  MRN: [id number]  [place] 1965  Date of evaluation: 3/31/2021  Provider: Branda Frankel, DO    CHIEF COMPLAINT       Chief Complaint   Patient presents with    Cough     x3-4days         HISTORY OF PRESENT ILLNESS   (Location/Symptom, Timing/Onset, Context/Setting, Quality, Duration, Modifying Factors, Severity)  Note limiting factors. Jim Daniels is a 63 y.o. female who is mentally challenged  presents to the emergency department . Patient was sent in to the ER because sister does not feel that she is doing well. Patient was diagnosed with Covid 3 days ago. Sister states that patient is not acting like herself. Confused unsteady and coughing a lot. Feels like she is not able to eat drink or take her pills. Complaining of back pain. Urinated in bed which is unusual.  Sister that cares for her now has Covid and another sister is in intensive care with Covid. The sister that called is concerned that this patient will get sick like the sister in ICU and just wants to make sure nothing is being overlooked. She does not feel like patient is doing well right now. HPI    Nursing Notes were reviewed. REVIEW OF SYSTEMS    (2-9 systems for level 4, 10 or more for level 5)     Review of Systems   Unable to perform ROS: Other   Constitutional: Positive for activity change and appetite change. Respiratory: Positive for cough. Genitourinary:        Urinary incontinence   Musculoskeletal: Positive for gait problem. Except as noted above the remainder of the review of systems was reviewed and negative.        PAST MEDICAL HISTORY     Past Medical History:   Diagnosis Date    Acquired hypothyroidism     Arthritis     Bacteremia due to Gram-negative bacteria 12/11/2016    Mental retardation, mild (I.Q. 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 classified elsewhere     Seizures ([facility] [address] Relationships    Social connections     Talks on phone: None     Gets together: None     Attends Sabianism service: None     Active member of club or organization: None     Attends meetings of clubs or organizations: None     Relationship status: None    Intimate partner violence     Fear of current or ex partner: None     Emotionally abused: None     Physically abused: None     Forced sexual activity: None   Other Topics Concern    None   Social History Narrative    None       SCREENINGS                        PHYSICAL EXAM    (up to 7 for level 4, 8 or more for level 5)     ED Triage Vitals [03/31/21 1548]   BP Temp Temp Source Pulse Resp SpO2 Height Weight   106/62 100.2 °F (37.9 °C) Oral 81 18 100 % 5' 3\" (1.6 m) 150 lb (68 kg)       Physical Exam  Vitals signs and nursing note reviewed. Constitutional:       General: She is not in acute distress. Appearance: She is well-developed. She is not diaphoretic. HENT:      Head: Normocephalic and atraumatic. Comments: Wearing helmet     Right Ear: External ear normal.      Left Ear: External ear normal.      Mouth/Throat:      Pharynx: No oropharyngeal exudate. Eyes:      Conjunctiva/sclera: Conjunctivae normal.      Pupils: Pupils are equal, round, and reactive to light. Neck:      Musculoskeletal: Normal range of motion and neck supple. Thyroid: No thyromegaly. Vascular: No JVD. Trachea: No tracheal deviation. Cardiovascular:      Rate and Rhythm: Normal rate and regular rhythm. Heart sounds: Normal heart sounds. No murmur. Pulmonary:      Effort: Pulmonary effort is normal. No respiratory distress. Breath sounds: Normal breath sounds. No stridor. No wheezing, rhonchi or rales. Abdominal:      General: Bowel sounds are normal.      Palpations: Abdomen is soft. Tenderness: There is no abdominal tenderness. There is no guarding. Musculoskeletal: Normal range of motion. Right lower leg: No edema. Left lower leg: No edema. Skin:     General: Skin is warm and dry. Findings: No rash. Neurological:      Mental Status: She is alert and oriented to person, place, and time. Cranial Nerves: No cranial nerve deficit. Psychiatric:         Behavior: Behavior normal.         DIAGNOSTIC RESULTS     EKG: All EKG's are interpreted by the Emergency Department Physician who either signs or Co-signs this chart in the absence of a cardiologist.        RADIOLOGY:   Non-plain film images such as CT, Ultrasound and MRI are read by the radiologist. Plain radiographic images are visualized and preliminarily interpreted by the emergency physician with the below findings:    Chest x-ray shows right lower pneumonia worse than previous study    Interpretation per the Radiologist below, if available at the time of this note:    XR CHEST PORTABLE    (Results Pending)         ED BEDSIDE ULTRASOUND:   Performed by ED Physician - none    LABS:  Labs Reviewed - No data to display    All other labs were within normal range or not returned as of this dictation. EMERGENCY DEPARTMENT COURSE and DIFFERENTIAL DIAGNOSIS/MDM:   Vitals:    Vitals:    03/31/21 1548   BP: 106/62   Pulse: 81   Resp: 18   Temp: 100.2 °F (37.9 °C)   TempSrc: Oral   SpO2: 100%   Weight: 150 lb (68 kg)   Height: 5' 3\" (1.6 m)       Patient has COVID-19. Pneumonia worse than it was 3 days ago but patient is not hypoxic. She is not tachycardic or hypotensive. There is nothing to suggest sepsis. Patient was given steroids and Zithromax. She will go home and we are providing home health care because she has MRDD and her sister has Covid and is ill. MDM      REASSESSMENT          CRITICAL CARE TIME   Total Critical Care time was 0 minutes, excluding separately reportable procedures. There was a high probability of clinically significant/life threatening deterioration in the patient's condition which required my urgent intervention.

## 2021-03-31 NOTE — ED NOTES
Discharge instructions provided to patients sister Merline Barrios via telephone. She verbalized understanding. [facility] will be transporting home. [address] for pickup.      Elizabeth Raman RN  03/31/21 [id number]

## 2021-03-31 NOTE — ED NOTES
Bed: 12  Expected date:   Expected time:   Means of arrival:   Comments:  63 F - general illness. Covid + test 3 days ago.  106/66,85,100%

## 2021-03-31 NOTE — CARE COORDINATION
This CM spoke with Donna HERRING regarding this patient's illness course and her planned return to ER at the recommendation of the provider she had a virtual visit with today. CM also received a call from patient's sister/primary caregiver Merline Barrios who provided additional details about patient's health status. Per Merline Barrios, patient is alert and active at baseline. Patient is continent at baseline. Patient's sister states the patient is now [place] a hard time waking up to eat and drink\" and is not able to take her meds. Per report, patient has been complaining of back pain and has become incontinent. Merline Barrios states that their other sister Layla Villalobos is here at [address] also with COVID. Layla Villalobos is patient's legal guardian and Merline Barrios states \"I don't want Giancarlo Nelson to get sick like Yohana on my watch. \" Both the patient and Merline Barrios are also COVID positive. This CM communicated all info provided by April Andrews and patient's sister to patient's ER RN Blaise Prieto and ER physician Dr. Tracie Randle. CM emphasized that patient's sister would like ER staff to understand that patient has experienced an acute change from her baseline.

## 2021-03-31 NOTE — CARE COORDINATION
Patient contacted regarding [id number]-36 diagnosis\". Discussed COVID-19 related testing which was available at this time. Test results were positive. Patient/ EC [place]  informed of results, if available? Yes    Care Transition Nurse contacted the family by telephone to perform post discharge assessment. Call within 2 business days of discharge: Yes. Verified name and  with family as identifiers. Provided introduction to self, and explanation of the CTN/ACM role, and reason for call due to risk factors for infection and/or exposure to COVID-19. Symptoms reviewed with family who verbalized the following symptoms: fatigue and cough. Due to worsening symptoms encounter was routed to provider for escalation. Discussed follow-up appointments. If no appointment was previously scheduled, appointment scheduling offered: Yes  [facility] follow up appointment(s):   Future Appointments   Date Time Provider Katelyn Gillette   2021 11:00 AM Maico Peres MD [facility]     Non-[facility] follow up appointment(s):     Non-face-to-face services provided:  Scheduled appointment with PCP-dale lowery  Obtained and reviewed discharge summary and/or continuity of care documents     Advance Care Planning:   Does patient have an Advance Directive:  reviewed and current. Patient has following risk factors of: no known risk factors. CTN reviewed discharge instructions, medical action plan and red flags such as increased shortness of breath, increasing fever and signs of decompensation with family who verbalized understanding. Discussed exposure protocols and quarantine with CDC Guidelines What to do if you are sick with coronavirus disease .  Family was given an opportunity for questions and concerns. The family agrees to contact the  PCP office for questions related to their healthcare. CTN provided contact information for future needs.     Reviewed and educated family on any new and changed medications related to discharge diagnosis     Was patient discharged with a pulse oximeter-no     Patient/family/caregiver given information for [employer] Loop and agrees to enroll yes    Patient's preferred e-mail:   [email or web address]  Patient's preferred phone number:   Based on Loop alert triggers, patient will be contacted by nurse care manager for worsening symptoms. Pt enrolled in LOOP    I spoke with patients sister and emergency contact -Clarita Garcia- she states Shane Bain is still feeling fatigued and has a cough, no temp or acute shortness of breath, Reviewed CDC recommendations and symptoms to monitor, discussed need to quarantine as per CDC recommendations, She states Shane Bain is taking in fluids and eating, reminded to increase fluid intake and get plenty of rest, advised that if she develops any shortness of breath or is getting worse, she should return to ER for follow up, advised she needs follow up ASAP with PCP, Danny Angeles is assisting with scheduling follow up. Enrolled in LOOP.

## 2021-03-31 NOTE — ED NOTES
Patients sister called to check on patient. States she thinks patient is dehydrated because of her recent illness and has been increasingly fatigued and unsteady on her feet. Sister states she does not think she can safely care for patient in her current state. Dr Ariana Foote notified.       Tiffany Krueger RN  03/31/21 8105

## 2021-03-31 NOTE — PROGRESS NOTES
Jet Pierre is a 63 y.o. female evaluated via telephone on 3/31/2021. Consent:  She and/or health care decision maker is aware that that she may receive a bill for this telephone service, depending on her insurance coverage, and has provided verbal consent to proceed: Yes    Documentation:  I communicated with the patient and/or health care decision maker about worsening COVID-19 symptoms. Details of this discussion including any medical advice provided:    Subjective  Jet Pierre, 63 y.o. female presents today with:  Chief Complaint   Patient presents with    Positive For Covid-19     x 5 days       HPI     Patient presents with worsening COVID symptoms. Phone visit completed with Flaco Molina. States patient has had a runny nose since Friday. Monday was lethargic. Usually very high functioning, takes her own medications. Could not arouse herself on Monday, so her sister took her to the ED. They sent her home stated she did not have pneumonia. Has been breathing harder since then. Does not want to get out of bed, had an urinary accident this morning. Still very lethargic. Patient is saying she feels bad. Has been dizzy, when walking she has been bumping into doors etc. Getting very short of breath. Has been having back pains. Getting tired even when eating. Has not had a fever    Past Medical History:   Diagnosis Date    Acquired hypothyroidism     Arthritis     Bacteremia due to Gram-negative bacteria 12/11/2016    Mental retardation, mild (I.Q. 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 classified elsewhere     Seizures (HCC)        No Known Allergies    Current Outpatient Medications on File Prior to Visit   Medication Sig Dispense Refill    levothyroxine (SYNTHROID) 75 MCG tablet Take 1 tablet by mouth daily 90 tablet 4    BRIVIACT 50 MG TABS 100 mg.  Take 100mg QAM and 200mg QPM  3    cloBAZam (ONFI) 10 MG TABS tablet TAKE ONE TABLET BY MOUTH EVERY 12 HOURS  5 Yes    Total Time: minutes: 11-20 minutes    This telephone visit was provided as a focused evaluation during the [facility] -[facility] pandemic/national emergency. A comprehensive review of all previous patient history and testing was not conducted. Pertinent findings were elicited during the visit.       Note: not billable if this call serves to triage the patient into an appointment for the relevant concern      Epi Em

## 2021-03-31 NOTE — CARE COORDINATION
1 CM spoke with ER physician Dr. Kelin Mckinney for results of patient's work-up and potential DC plan. ER physician states that while patient does have pneumonia related to COVID, labs and assessment of patient do not indicate a need for admission to the hospital. Physician states patient will be discharged home. 1730 This CM spoke with patient's sister Francisco Javier Mccullough to update her on findings, DC plan, new medications, and to determine if she would be willing to have skilled [place] [address] visit the patient for further monitoring. Francisco Javier Mccullough was in agreement with [place] [address], and chose [facility]. CM informed Francisco Javier Mccullough that CM would contact [facility] and then let her know if they were able to accept the patient. If unable to accept, an alternate [place] [address] agency will be chosen. Patient's sister also stated that the patient will need an ambulance for transport home. [address] answering service was contacted by CM to see if the agency accepts COVID positive patients. Message left, await call-back. Physician updated and [facility] order placed in [facility]    1800, CM attempted to call patient's sister to let her know [facility] can accept the patient. No answer and HIPAA compliant message left on voicemail. Two additional calls were made to update sister, no answer. 1835 Multiple additional calls were made to patient's sister, each going directly to voicemail. 1855 CM spoke with sister Francisco Javier Mccullough, gave a full report on testing and results done while patient was in ER. CM went over DC instructions including new medications Zithromax and prednisone, both of which will start tomorrow as patient received each in the ER today. Patient's sister verbalized understanding of all.     1900 Transportation set up by unit secretary. Referral called to [facility], confirmation of address given and CM asked that [facility] contact patient's sister Francisco Javier Mccullough at [phone] as patient's other sister/guardian Fernando Rocha is presently hospitalized.

## 2021-04-01 ENCOUNTER — CARE COORDINATION (OUTPATIENT)
Dept: CARE COORDINATION | Age: 56
End: 2021-04-01

## 2021-04-01 NOTE — CARE COORDINATION
Call placed to patient for COVID-19 Risk Monitoring. Unable to reach patient's caregiver by phone. HIPPA compliant message left regarding the purpose of the call. Requested call back. Provided call back number.
follow-up call in 5-7 days based on severity of symptoms and risk factors. Received call back from patient's sister, An Castle. She reports patient symptoms improving. Patient is up and moving around more like her usual self. She reports improvement in fluid intake. Discussed scheduling follow-up appointment with Alvin Kruse MD. Will have [address] Specialist contact to schedule.

## 2021-04-02 ASSESSMENT — ENCOUNTER SYMPTOMS
BACK PAIN: 1
SHORTNESS OF BREATH: 1
COUGH: 1
RHINORRHEA: 1

## 2021-04-05 LAB
BLOOD CULTURE, ROUTINE: NORMAL
CULTURE, BLOOD 2: NORMAL

## 2021-04-08 ENCOUNTER — CARE COORDINATION (OUTPATIENT)
Dept: CARE COORDINATION | Age: 56
End: 2021-04-08

## 2021-04-09 ENCOUNTER — CARE COORDINATION (OUTPATIENT)
Dept: CARE COORDINATION | Age: 56
End: 2021-04-09

## 2021-04-09 NOTE — CARE COORDINATION
You Patient resolved from the Care Transitions episode on 4/9/2021  Discussed COVID-19 related testing which was available at this time. Test results were positive. Patient informed of results, if available? Yes    Patient/family has been provided the following resources and education related to COVID-19:                         Signs, symptoms and red flags related to COVID-19            CDC exposure and quarantine guidelines            [facility] exposure contact - [phone]            Contact for their local Department of Health                 Patient currently reports that the following symptoms have improved:  all symptoms resolved and no new/worsening symptoms     No further outreach scheduled with this CTN/ACM. Episode of Care resolved. Patient has this CTN/ACM contact information if future needs arise.

## 2021-04-14 ENCOUNTER — VIRTUAL VISIT (OUTPATIENT)
Dept: FAMILY MEDICINE CLINIC | Age: 56
End: 2021-04-14
Payer: MEDICARE

## 2021-04-14 DIAGNOSIS — Q07.9 CONGENITAL ENCEPHALOPATHY (HCC): Primary | ICD-10-CM

## 2021-04-14 DIAGNOSIS — Z12.31 ENCOUNTER FOR SCREENING MAMMOGRAM FOR MALIGNANT NEOPLASM OF BREAST: ICD-10-CM

## 2021-04-14 DIAGNOSIS — G40.909 SEIZURE DISORDER (HCC): ICD-10-CM

## 2021-04-14 PROBLEM — K92.1 BLOOD IN STOOL: Chronic | Status: RESOLVED | Noted: 2019-11-25 | Resolved: 2021-04-14

## 2021-04-14 PROCEDURE — 99214 OFFICE O/P EST MOD 30 MIN: CPT | Performed by: FAMILY MEDICINE

## 2021-04-14 PROCEDURE — G8427 DOCREV CUR MEDS BY ELIG CLIN: HCPCS | Performed by: FAMILY MEDICINE

## 2021-04-14 PROCEDURE — 3017F COLORECTAL CA SCREEN DOC REV: CPT | Performed by: FAMILY MEDICINE

## 2021-04-14 NOTE — PROGRESS NOTES
Becky Husain is a 63 y.o. female evaluated via telephone on 2021. Consent:  She and/or health care decision maker is aware that that she may receive a bill for this telephone service, depending on her insurance coverage, and has provided verbal consent to proceed: Yes      I affirm this is a Patient Initiated Episode with an Established Patient who has not had a related appointment within my department in the past 7 days or scheduled within the next 24 hours. Total Time:     Note: not billable if this call serves to triage the patient into an appointment for the relevant concern      Marcin CARUSO     2021    TELEHEALTH EVALUATION -- Audio (During ALUXF-08 public health emergency)    Chief Complaint   Patient presents with    Concern For COVID-19    Seizures       HPI     Becky Husain (:  1965) has requested an audio evaluation for the following concern(s):    Recently had COVID. Strength is improving. No longer urinating on herself. Cough resolved. Appetite is good. Lost her sister, [facility], to COVID this week. Seizures. Is having small seizures d/t stress related to death of her sister. No new deficits. No facial droop. No speech impairment. No unilateral weakness. Prior to Visit Medications    Medication Sig Taking? Authorizing Provider   zoster recombinant adjuvanted vaccine [facility]) 50 MCG/0.5ML SUSR injection Inject 0.5 mLs into the muscle See Admin Instructions 1 dose now and repeat in 2-6 months  Polina Ramirez MD   levothyroxine (SYNTHROID) 75 MCG tablet Take 1 tablet by mouth daily  Polina Ramirez MD   BRIVIACT 50 MG TABS 100 mg.  Take 100mg QAM and 200mg QPM  Historical Provider, MD   cloBAZam (ONFI) 10 MG TABS tablet TAKE ONE TABLET BY MOUTH EVERY 12 HOURS  Historical Provider, MD   vitamin D (CHOLECALCIFEROL) 5000 units CAPS capsule Take 1 capsule by mouth daily  Wendy Gonzalez DO   acetaminophen (TYLENOL) 325 MG tablet Take 2 tablets by mouth every 6 hours as needed for Pain or Fever  Rubén Evangelista MD   risperiDONE (RISPERDAL) 3 MG tablet Take 3 mg by mouth 2 times daily  Historical Provider, MD   topiramate (TOPAMAX) 200 MG tablet Take 200 mg by mouth 2 times daily  Historical Provider, MD         PHYSICAL EXAMINATION:    Patient-Reported Vitals 3/31/2021   Patient-Reported Weight 130   Patient-Reported Height 5'4\"   Patient-Reported Temperature 97.9          Patient appears to be alert and oriented to person, place, time, situation and is in no acute distress. Respiratory effort appears normal. Mood appears stable and speech and thought are grossly normal.    Lab Results   Component Value Date    TSH 0.152 (L) 03/29/2021     Lipid Profile: No components found for: CHLPL  Lab Results   Component Value Date    TRIG 152 01/12/2019    TRIG 117 07/15/2017    TRIG 90 03/25/2013     Lab Results   Component Value Date     (H) 11/30/2019     (H) 01/12/2019     (H) 07/15/2017     Lab Results   Component Value Date    LDLCALC 118 11/30/2019    LDLCALC 120 01/12/2019    LDLCALC 131 (H) 07/15/2017     No results found for: LABVLDL  Hemoglobin A1C:   Lab Results   Component Value Date    LABA1C 5.6 09/29/2020       Xr Chest Portable    Result Date: 3/31/2021  In the right lower lobe with worsening airspace disease when compared to previous study. Mild infiltrate or atelectasis is seen in the left lower lobe. Xr Chest Portable    Result Date: 3/29/2021  PATCHY BIBASILAR INFILTRATES. POSSIBLE VIRAL PNEUMONIA. ASSESSMENT/PLAN:  Jeimy Gannon was seen today for concern for covid-19 and seizures. 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 Increased seizures, mild, d/t stress.     Encounter for screening

## 2021-04-22 ENCOUNTER — TELEPHONE (OUTPATIENT)
Dept: FAMILY MEDICINE CLINIC | Age: 56
End: 2021-04-22

## 2021-04-27 ENCOUNTER — CARE COORDINATION (OUTPATIENT)
Dept: CARE COORDINATION | Age: 56
End: 2021-04-27

## 2021-04-27 NOTE — CARE COORDINATION
Ambulatory Care Coordination Note  4/27/2021  CM Risk Score: 8  [place] 10 Year Mortality Risk Score: 4%     ACC: Sharyle Jakob, RN    Summary Note I called patients sister/EC- Petra French and left message requesting she call me back to discuss Care Coordination and my role. I will mail introductory letter and reach out to Quincy Doll at later date.             Future Appointments   Date Time Provider Katelyn Gillette   6/22/2021 11:00 AM June Velarde MD [facility]   10/14/2021 10:30 AM June Velarde MD [address] [place] [address]

## 2021-04-27 NOTE — LETTER
4/27/2021    Jacek Davison  [address] [place] [address]        Dear Jacek Davison,        My name is Terri Watters and I am a registered nurse who partners with Noe Bradley MD to improve patients' health. As a member of your health care team, I would work with other providers involved in your care, offer education for your specific health conditions, and connect you with additional resources as needed. I will collaborate with Noe Bradley MD to support you in following your treatment plan. The additional support I provide is no additional cost to you. My primary focus is to help you achieve specific goals and improve your health. We are committed to walk with you on this journey and look forward to working with you. Please call me to further discuss your healthcare needs. I am available by phone at  [phone].       In good health,           Terri Watters RN

## 2021-05-05 ENCOUNTER — CARE COORDINATION (OUTPATIENT)
Dept: CARE COORDINATION | Age: 56
End: 2021-05-05

## 2021-05-06 ENCOUNTER — CARE COORDINATION (OUTPATIENT)
Dept: CARE COORDINATION | Age: 56
End: 2021-05-06

## 2021-05-06 NOTE — CARE COORDINATION
Ambulatory Care Coordination Note  5/6/2021  CM Risk Score: 8  Charlson 10 Year Mortality Risk Score: 4%     ACC: Eladio Maria, RN    Summary Note: I received a call back from Palmira's sister Merline Barrios- she was responding to my previous method and wanted to discuss care coordination, I called Merline Barrios back-no answer- I left a message with the nature of my call and requested call back tomorrow. Prior to Admission medications    Medication Sig Start Date End Date Taking? Authorizing Provider   zoster recombinant adjuvanted vaccine [facility]) 50 MCG/0.5ML SUSR injection Inject 0.5 mLs into the muscle See Admin Instructions 1 dose now and repeat in 2-6 months 12/21/20 6/19/21  Eleazar Norman MD   levothyroxine (SYNTHROID) 75 MCG tablet Take 1 tablet by mouth daily 12/21/20   Eleazar Norman MD   BRIVIACT 50 MG TABS 100 mg.  Take 100mg QAM and 200mg QPM 11/7/19   Historical Provider, MD   cloBAZam (ONFI) 10 MG TABS tablet TAKE ONE TABLET BY MOUTH EVERY 12 HOURS 12/30/18   Historical Provider, MD   vitamin D (CHOLECALCIFEROL) 5000 units CAPS capsule Take 1 capsule by mouth daily 1/15/19   Carmen Cr DO   acetaminophen (TYLENOL) 325 MG tablet Take 2 tablets by mouth every 6 hours as needed for Pain or Fever 12/12/16   Luz Marina Rodríguez MD   risperiDONE (RISPERDAL) 3 MG tablet Take 3 mg by mouth 2 times daily    Historical Provider, MD   topiramate (TOPAMAX) 200 MG tablet Take 200 mg by mouth 2 times daily    Historical Provider, MD       Future Appointments   Date Time Provider Katelyn Gillette   6/7/2021  3:40 PM [facility] [address] [place] [address]   6/22/2021 11:00 AM MD Merlin White [address] [place] [address] Merlin   10/14/2021 10:30 AM Eleazar Norman MD [address] [place] [address]

## 2021-05-07 ENCOUNTER — CARE COORDINATION (OUTPATIENT)
Dept: CARE COORDINATION | Age: 56
End: 2021-05-07

## 2021-05-07 NOTE — CARE COORDINATION
Ambulatory Care Coordination Note  5/7/2021  CM Risk Score: 8  Charlson 10 Year Mortality Risk Score: 4%     ACC: Darren Warren, OSCAR    Summary Note: I called [facility] [address] emergency contact, I discussed Care Coordination and my role. Yani Green states they are still grieving the loss of their sister- Rosa Kilgore who was the family matriarch. She sates all her family has united in meeting Palmira's needs, Nhung Hobbs will be going back to work next week, she has all her follow -ups scheduled and has all her medications, Yani Green is working with "[employer] to become the payee for Nhung Hobbs, and she is calling all of [facility]'s providers and making them aware that she is now [employer] primary contact. Declined Care Coordination Services at this tie, but Yani Green has my contact number and will call me if care needs arise. Prior to Admission medications    Medication Sig Start Date End Date Taking? Authorizing Provider   zoster recombinant adjuvanted vaccine [facility]) 50 MCG/0.5ML SUSR injection Inject 0.5 mLs into the muscle See Admin Instructions 1 dose now and repeat in 2-6 months 12/21/20 6/19/21  Mi Yung MD   levothyroxine (SYNTHROID) 75 MCG tablet Take 1 tablet by mouth daily 12/21/20   Mi Yung MD   BRIVIACT 50 MG TABS 100 mg.  Take 100mg QAM and 200mg QPM 11/7/19   Historical Provider, MD   cloBAZam (ONFI) 10 MG TABS tablet TAKE ONE TABLET BY MOUTH EVERY 12 HOURS 12/30/18   Historical Provider, MD   vitamin D (CHOLECALCIFEROL) 5000 units CAPS capsule Take 1 capsule by mouth daily 1/15/19   Geo Flavors, DO   acetaminophen (TYLENOL) 325 MG tablet Take 2 tablets by mouth every 6 hours as needed for Pain or Fever 12/12/16   Marla Haynes MD   risperiDONE (RISPERDAL) 3 MG tablet Take 3 mg by mouth 2 times daily    Historical Provider, MD   topiramate (TOPAMAX) 200 MG tablet Take 200 mg by mouth 2 times daily    Historical Provider, MD       Future Appointments   Date Time Provider Department [place]   6/7/2021  3:40 PM [facility] ROOM 2 [facility]   6/22/2021 11:00 AM Ntae Avina MD [address]   10/14/2021 10:30 AM Nate Avina MD [address] [place] [address]

## 2021-07-02 ENCOUNTER — APPOINTMENT (OUTPATIENT)
Dept: CT IMAGING | Age: 56
End: 2021-07-02
Payer: MEDICARE

## 2021-07-02 ENCOUNTER — APPOINTMENT (OUTPATIENT)
Dept: GENERAL RADIOLOGY | Age: 56
End: 2021-07-02
Payer: MEDICARE

## 2021-07-02 ENCOUNTER — HOSPITAL ENCOUNTER (EMERGENCY)
Age: 56
Discharge: HOME | End: 2021-07-02
Payer: MEDICARE

## 2021-07-02 VITALS
HEART RATE: 88 BPM | DIASTOLIC BLOOD PRESSURE: 47 MMHG | OXYGEN SATURATION: 99 % | BODY MASS INDEX: 24.16 KG/M2 | RESPIRATION RATE: 19 BRPM | SYSTOLIC BLOOD PRESSURE: 115 MMHG | TEMPERATURE: 97.8 F | HEIGHT: 65 IN | WEIGHT: 145 LBS

## 2021-07-02 DIAGNOSIS — S01.81XA LACERATION OF FOREHEAD, INITIAL ENCOUNTER: Primary | ICD-10-CM
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
## 2021-07-02 PROCEDURE — 72110 X-RAY EXAM L-2 SPINE 4/>VWS: CPT

## 2021-07-02 PROCEDURE — 2500000003 HC RX 250 WO HCPCS: Performed by: PHYSICIAN ASSISTANT

## 2021-07-02 PROCEDURE — 70450 CT HEAD/BRAIN W/O DYE: CPT

## 2021-07-02 PROCEDURE — 73562 X-RAY EXAM OF KNEE 3: CPT

## 2021-07-02 PROCEDURE — 6370000000 HC RX 637 (ALT 250 FOR IP): Performed by: PHYSICIAN ASSISTANT

## 2021-07-02 PROCEDURE — 12011 RPR F/E/E/N/L/M 2.5 CM/<: CPT

## 2021-07-02 PROCEDURE — 73564 X-RAY EXAM KNEE 4 OR MORE: CPT

## 2021-07-02 PROCEDURE — 99284 EMERGENCY DEPT VISIT MOD MDM: CPT

## 2021-07-02 PROCEDURE — 2580000003 HC RX 258: Performed by: PHYSICIAN ASSISTANT

## 2021-07-02 RX ORDER — MAGNESIUM HYDROXIDE 1200 MG/15ML
1000 LIQUID ORAL ONCE
Status: COMPLETED | OUTPATIENT
Start: 2021-07-02 | End: 2021-07-02

## 2021-07-02 RX ORDER — LIDOCAINE HYDROCHLORIDE AND EPINEPHRINE 10; 10 MG/ML; UG/ML
20 INJECTION, SOLUTION INFILTRATION; PERINEURAL ONCE
Status: COMPLETED | OUTPATIENT
Start: 2021-07-02 | End: 2021-07-02

## 2021-07-02 RX ORDER — ACETAMINOPHEN 500 MG
1000 TABLET ORAL ONCE
Status: COMPLETED | OUTPATIENT
Start: 2021-07-02 | End: 2021-07-02

## 2021-07-02 RX ADMIN — WATER 1000 ML: 1 IRRIGANT IRRIGATION at 20:00

## 2021-07-02 RX ADMIN — LIDOCAINE HYDROCHLORIDE,EPINEPHRINE BITARTRATE 20 ML: 10; .01 INJECTION, SOLUTION INFILTRATION; PERINEURAL at 20:12

## 2021-07-02 RX ADMIN — ACETAMINOPHEN 1000 MG: 500 TABLET ORAL at 19:32

## 2021-07-02 ASSESSMENT — PAIN SCALES - GENERAL
PAINLEVEL_OUTOF10: 4
PAINLEVEL_OUTOF10: 6

## 2021-07-02 ASSESSMENT — PAIN DESCRIPTION - ORIENTATION: ORIENTATION: RIGHT

## 2021-07-02 ASSESSMENT — ENCOUNTER SYMPTOMS
DIARRHEA: 0
SHORTNESS OF BREATH: 0
COLOR CHANGE: 0
VOMITING: 0
NAUSEA: 0
SINUS PAIN: 0
ABDOMINAL PAIN: 0
COUGH: 0
RHINORRHEA: 0
BACK PAIN: 0
CHEST TIGHTNESS: 0
EYE REDNESS: 0
EYE DISCHARGE: 0

## 2021-07-02 ASSESSMENT — PAIN SCALES - WONG BAKER: WONGBAKER_NUMERICALRESPONSE: 4

## 2021-07-02 ASSESSMENT — PAIN DESCRIPTION - LOCATION: LOCATION: HEAD

## 2021-07-02 ASSESSMENT — PAIN DESCRIPTION - PAIN TYPE: TYPE: ACUTE PAIN

## 2021-07-02 ASSESSMENT — PAIN DESCRIPTION - FREQUENCY: FREQUENCY: INTERMITTENT

## 2021-07-02 NOTE — ED NOTES
Bed: 08  Expected date: 7/2/21  Expected time: 6:01 PM  Means of arrival: [facility]  Comments:  63 F - fell, hit head, has laceration. No thinners. No LOC.  IV/labs     Jayce Bauman RN  07/02/21 2793

## 2021-07-02 NOTE — ED TRIAGE NOTES
Pt to ed from home via ems with c/o lac to forehead. Pt has history of developmental disability and frequent falls. Pt is supposed to wear a protective helmet. Not clear of helmet was worn at time of fall or  Not. Per ems, pt tripped on steps and hit head. No LOC or blood thinners. 1.5 cm lac to forehead noted, through dermis to SUB Q. Bleeding controlled. Pt Oriented to self, at baseline. PT calm and cooperative. Affect flat.  PERRLA

## 2021-07-02 NOTE — ED PROVIDER NOTES
[facility] ED  EMERGENCY DEPARTMENT ENCOUNTER      Pt Name: Lord Denise  MRN: [id number]  [place] 1965  Date of evaluation: 7/2/2021  Provider: Ernst Dang PA-C    CHIEF COMPLAINT       Chief Complaint   Patient presents with    Laceration     to right forhead, tripped on step, no loc or thinners         HISTORY OF PRESENT ILLNESS   (Location/Symptom, Timing/Onset, Context/Setting, Quality, Duration, Modifying Factors, Severity)  Note limiting factors. Lord Denise is a 61 y.o. female who presents to the emergency department status post fall. Patient's family bedside states patient was walking down multiple stairs when she fell forward. Patient's family denies loss of consciousness. Patient's family states that she does have a history of seizures unknown if she had a seizure at this time. Patient is not postictal at this time patient's family states she is acting like herself. Patient denies change in vision, nausea, vomiting. Patient denies numbness or tingling. Patient complaining of mild headache with sudden onset, achy, constant, mild, lower back pain along with bilateral knee pain. Patient denies saddle anesthesia or loss of bowel or bladder function. HPI    Nursing Notes were reviewed. REVIEW OF SYSTEMS    (2-9 systems for level 4, 10 or more for level 5)     Review of Systems   Constitutional: Negative for activity change, chills, fatigue and fever. HENT: Negative for congestion, hearing loss, rhinorrhea, sinus pain, sneezing and tinnitus. Eyes: Negative for discharge, redness and visual disturbance. Respiratory: Negative for cough, chest tightness and shortness of breath. Cardiovascular: Negative for chest pain and leg swelling. Gastrointestinal: Negative for abdominal pain, diarrhea, nausea and vomiting. Endocrine: Negative for heat intolerance, polydipsia and polyuria. Genitourinary: Negative for dysuria, flank pain, hematuria and urgency. Musculoskeletal: Negative for back pain, joint swelling and myalgias. Skin: Positive for wound. Negative for color change and rash. Allergic/Immunologic: Negative for immunocompromised state. Neurological: Negative for tremors, seizures, syncope, light-headedness and headaches. Hematological: Does not bruise/bleed easily. Psychiatric/Behavioral: Negative for agitation and behavioral problems. The patient is not nervous/anxious. Except as noted above the remainder of the review of systems was reviewed and negative. PAST MEDICAL HISTORY     Past Medical History:   Diagnosis Date    Acquired hypothyroidism     Arthritis     Bacteremia due to Gram-negative bacteria 12/11/2016    Mental retardation, mild (I.Q. 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 Take 100mg QAM and 200mg QPM    CLOBAZAM (ONFI) 10 MG TABS TABLET    TAKE ONE TABLET BY MOUTH EVERY 12 HOURS    LEVOTHYROXINE (SYNTHROID) 75 MCG TABLET    Take 1 tablet by mouth daily    RISPERIDONE (RISPERDAL) 3 MG TABLET    Take 3 mg by mouth 2 times daily    TOPIRAMATE (TOPAMAX) 200 MG TABLET    Take 200 mg by mouth 2 times daily    VITAMIN D (CHOLECALCIFEROL) 5000 UNITS CAPS CAPSULE    Take 1 capsule by mouth daily       ALLERGIES     Patient has no known allergies.     FAMILY HISTORY       Family History   Problem Relation Age of Onset   [address] Stroke Mother     Thyroid Disease Mother     Diabetes Sister     Diabetes Brother     Hypertension Father     Stroke Father         [address]          SOCIAL HISTORY       Social History     Socioeconomic History    Marital status: Single     Spouse name: Not on file    Number of children: Not on file    Years of education: Not on file    Highest education level: Not on file   Occupational History    Not on file   Tobacco Use    Smoking status: Never Smoker    Smokeless tobacco: Never Used   Vaping Use    Vaping Use: Never used   Substance and Sexual Activity    Alcohol use: No    Drug use: No    Sexual activity: Never   Other Topics Concern    Not on file   Social History Narrative    Not on file     Social Determinants of Health     Financial Resource Strain: Low Risk     Difficulty of Paying Living Expenses: Not hard at all   Food Insecurity: No Food Insecurity    Worried About [address] in the Last Year: Never true    Tang of Food in the Last Year: Never true   Transportation Needs: No Transportation Needs    Lack of Transportation (Medical): No    Lack of Transportation (Non-Medical): No   Physical Activity:     Days of Exercise per Week:     Minutes of Exercise per Session:    Stress:     Feeling of Stress :    Social Connections:     Frequency of Communication with Friends and Family:     Frequency of Social Gatherings with Friends and Family:     Attends [employer] Services:     Active Member of Clubs or Organizations:     Attends Club or Organization Meetings:     Marital Status:    Intimate Partner Violence:     Fear of Current or Ex-Partner:     Emotionally Abused:     Physically Abused:     Sexually Abused:        SCREENINGS                        PHYSICAL EXAM    (up to 7 for level 4, 8 or more for level 5)     ED Triage Vitals [07/02/21 1809]   BP Temp Temp Source Pulse Resp SpO2 Height Weight   (!) 115/47 97.8 °F (36.6 °C) Oral 88 18 99 % 5' 5\" (1.651 m) 145 lb (65.8 kg)       Physical Exam  Vitals and nursing note reviewed. Constitutional:       General: She is not in acute distress. Appearance: Normal appearance.  She is normal weight. HENT:      Head: Normocephalic. Comments: 2 cm linear laceration above the patient's right eye. Right Ear: Tympanic membrane, ear canal and external ear normal.      Left Ear: Tympanic membrane, ear canal and external ear normal.      Nose: Nose normal.      Mouth/Throat:      Mouth: Mucous membranes are moist.      Pharynx: Oropharynx is clear. No oropharyngeal exudate or posterior oropharyngeal erythema. Eyes:      Conjunctiva/sclera: Conjunctivae normal.      Pupils: Pupils are equal, round, and reactive to light. Cardiovascular:      Rate and Rhythm: Normal rate and regular rhythm. Pulses: Normal pulses. Heart sounds: Normal heart sounds. No murmur heard. No friction rub. Pulmonary:      Effort: Pulmonary effort is normal. No respiratory distress. Breath sounds: Normal breath sounds. No stridor. Abdominal:      General: Abdomen is flat. Bowel sounds are normal.      Palpations: Abdomen is soft. Genitourinary:     Vagina: No vaginal discharge. Musculoskeletal:         General: No swelling or tenderness. Normal range of motion. Cervical back: Normal range of motion. No rigidity. Neurological:      General: No focal deficit present. Mental Status: She is alert. Psychiatric:         Mood and Affect: Mood normal.         Behavior: Behavior normal.         DIAGNOSTIC RESULTS     EKG: All EKG's are interpreted by the Emergency Department Physician who either signs or Co-signs this chart in the absence of a cardiologist.        RADIOLOGY:   Non-plain film images such as CT, Ultrasound and MRI are read by the radiologist. Plain radiographic images are visualized and preliminarily interpreted by the emergency physician with the below findings:    No acute fracture or malalignment in any of the x-rays obtained today.     Interpretation per the Radiologist below, if available at the time of this note:    XR LUMBAR SPINE (MIN 4 VIEWS)   Final Result      NO DISPLACED FRACTURE OR SIGNIFICANT POSTTRAUMATIC COMPLICATION IDENTIFIED. XR KNEE LEFT (MIN 4 VIEWS)   Final Result      NO DISPLACED FRACTURE OR SIGNIFICANT POSTTRAUMATIC COMPLICATION IDENTIFIED. XR KNEE RIGHT (3 VIEWS)   Final Result      NO DISPLACED FRACTURE OR SIGNIFICANT POSTTRAUMATIC COMPLICATION IDENTIFIED. CT Head WO Contrast   Final Result      NO ACUTE INTRACRANIAL PROCESS 3/6/2020 IDENTIFIED. ED BEDSIDE ULTRASOUND:   Performed by ED Physician - none    LABS:  Labs Reviewed - No data to display    All other labs were within normal range or not returned as of this dictation. EMERGENCY DEPARTMENT COURSE and DIFFERENTIAL DIAGNOSIS/MDM:   Vitals:    Vitals:    07/02/21 1809   BP: (!) 115/47   Pulse: 88   Resp: 18   Temp: 97.8 °F (36.6 °C)   TempSrc: Oral   SpO2: 99%   Weight: 145 lb (65.8 kg)   Height: 5' 5\" (1.651 m)       57-year of age female who presents status post fall. Patient does have a history of seizures she is compliant with her medications according to family who are her caregivers. Patient is not postictal at this time. Patient has no nausea, vomiting, change in vision, ataxic gait since the fall. Patient does wear a helmet at baseline for frequent breakthrough seizures. CT head, x-ray L-spine, bilateral knee x-rays were all obtained. All imaging was negative for any acute injury. Patient's laceration was repaired. Patient was also given 1 g of Tylenol for a headache. Patient tolerated the procedure well was discharged home in stable condition with close follow-up with her PCP for suture removal.  Patient's family's questions were answered. MDM  Number of Diagnoses or Management Options        REASSESSMENT              CONSULTS:  None    PROCEDURES:  Unless otherwise noted below, none     Lac Repair    Date/Time: 7/2/2021 8:02 PM  Performed by: Portillo Klein PA-C  Authorized by:  Portillo Klein PA-C     Consent:     Consent obtained:  Verbal    Consent given by:  Guardian    Risks discussed:  Pain and poor cosmetic result  Anesthesia (see MAR for exact dosages): Anesthesia method:  Local infiltration    Local anesthetic:  Lidocaine 1% WITH epi  Laceration details:     Location:  Face    Face location:  Forehead    Length (cm):  2    Depth (mm):  10  Repair type:     Repair type:  Simple  Pre-procedure details:     Preparation:  Patient was prepped and draped in usual sterile fashion  Exploration:     Hemostasis achieved with:  Direct pressure    Wound extent: no areolar tissue violation noted, no fascia violation noted, no foreign bodies/material noted, no tendon damage noted, no underlying fracture noted and no vascular damage noted    Treatment:     Area cleansed with:  Saline    Amount of cleaning:  Standard    Irrigation solution:  Sterile saline    Irrigation volume:  200    Irrigation method:  Syringe  Skin repair:     Repair method:  Sutures    Suture size:  5-0    Suture material:  Prolene    Suture technique:  Simple interrupted    Number of sutures:  3  Approximation:     Approximation:  Close  Post-procedure details:     Dressing:  Adhesive bandage            FINAL IMPRESSION      1. Laceration of forehead, initial encounter    2. Fall, initial encounter          DISPOSITION/PLAN   DISPOSITION Decision To Discharge 07/02/2021 07:58:20 PM      PATIENT REFERRED TO:  MD Meme [address], [address] [facility]  [phone]    Go in 1 week  For suture removal      DISCHARGE MEDICATIONS:  New Prescriptions    No medications on file     Controlled Substances Monitoring:     No flowsheet data found.     (Please note that portions of this note were completed with a voice recognition program.  Efforts were made to edit the dictations but occasionally words are mis-transcribed.)    Yaima Ruff PA-C (electronically signed)  Attending Emergency Physician           Yaima Ruff PA-C  07/02/21 2009

## 2021-07-02 NOTE — ED NOTES
Pt undressed for Xray. Contusions/abrasions noted to bilateral knees.  Provider made aware     Yahaira Juárez RN  07/02/21 2617

## 2021-08-26 ENCOUNTER — HOSPITAL ENCOUNTER (OUTPATIENT)
Dept: WOMENS IMAGING | Age: 56
Discharge: HOME | End: 2021-08-28
Payer: MEDICARE

## 2021-08-26 DIAGNOSIS — R92.8 ABNORMAL MAMMOGRAM: Primary | ICD-10-CM

## 2021-08-26 DIAGNOSIS — Z12.31 ENCOUNTER FOR SCREENING MAMMOGRAM FOR MALIGNANT NEOPLASM OF BREAST: ICD-10-CM

## 2021-08-26 PROCEDURE — 77063 BREAST TOMOSYNTHESIS BI: CPT

## 2021-08-26 NOTE — RESULT ENCOUNTER NOTE
Please call patient's sister. Her mammogram is somewhat abnormal on the left. Radiologist has recommended additional mammogram and possibly an ultrasound. I have written those orders. They should be expecting a call to schedule.

## 2021-09-03 ENCOUNTER — HOSPITAL ENCOUNTER (OUTPATIENT)
Dept: ULTRASOUND IMAGING | Age: 56
Discharge: HOME | End: 2021-09-05
Payer: MEDICARE

## 2021-09-03 ENCOUNTER — HOSPITAL ENCOUNTER (OUTPATIENT)
Dept: WOMENS IMAGING | Age: 56
Discharge: HOME | End: 2021-09-05
Payer: MEDICARE

## 2021-09-03 DIAGNOSIS — R92.8 ABNORMAL MAMMOGRAM: ICD-10-CM

## 2021-09-03 PROCEDURE — 76642 ULTRASOUND BREAST LIMITED: CPT

## 2021-09-03 PROCEDURE — G0279 TOMOSYNTHESIS, MAMMO: HCPCS

## 2021-09-07 DIAGNOSIS — R92.8 ABNORMAL ULTRASOUND OF BREAST: Primary | ICD-10-CM

## 2021-09-07 NOTE — RESULT ENCOUNTER NOTE
Please call patient. The breast ultrasound shows possible deterioration of normal fatty tissue in breast. Radiologist recommends repeated scan in 6 weeks. I have ordered the ultrasound.

## 2022-02-16 DIAGNOSIS — E03.9 ACQUIRED HYPOTHYROIDISM: Chronic | ICD-10-CM

## 2022-02-19 DIAGNOSIS — E03.9 ACQUIRED HYPOTHYROIDISM: Chronic | ICD-10-CM

## 2022-02-22 DIAGNOSIS — E03.9 ACQUIRED HYPOTHYROIDISM: Chronic | ICD-10-CM

## 2022-02-22 RX ORDER — LEVOTHYROXINE SODIUM 0.07 MG/1
75 TABLET ORAL DAILY
Qty: 30 TABLET | Refills: 0 | Status: SHIPPED | OUTPATIENT
Start: 2022-02-22 | End: 2022-05-20

## 2022-02-22 NOTE — TELEPHONE ENCOUNTER
Future Appointments    Encounter Information    Provider Department Appt Notes   3/21/2022 Gris Goodwin MD 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

## 2022-02-23 RX ORDER — LEVOTHYROXINE SODIUM 0.07 MG/1
75 TABLET ORAL DAILY
Qty: 90 TABLET | Refills: 4 | OUTPATIENT
Start: 2022-02-23

## 2022-02-23 RX ORDER — LEVOTHYROXINE SODIUM 0.07 MG/1
75 TABLET ORAL DAILY
Qty: 90 TABLET | OUTPATIENT
Start: 2022-02-23

## 2022-03-18 LAB
ALBUMIN SERPL-MCNC: 4.4 G/DL (ref 3.5–4.6)
ALP BLD-CCNC: 149 U/L (ref 40–130)
ALT SERPL-CCNC: 72 U/L (ref 0–33)
ANION GAP SERPL CALCULATED.3IONS-SCNC: 10 MEQ/L (ref 9–15)
AST SERPL-CCNC: 43 U/L (ref 0–35)
BASOPHILS ABSOLUTE: 0 K/UL (ref 0–0.2)
BASOPHILS RELATIVE PERCENT: 1 %
BILIRUB SERPL-MCNC: <0.2 MG/DL (ref 0.2–0.7)
BUN BLDV-MCNC: 12 MG/DL (ref 6–20)
CALCIUM SERPL-MCNC: 9.5 MG/DL (ref 8.5–9.9)
CHLORIDE BLD-SCNC: 107 MEQ/L (ref 95–107)
CHOLESTEROL, TOTAL: 264 MG/DL (ref 0–199)
CO2: 22 MEQ/L (ref 20–31)
CREAT SERPL-MCNC: 0.68 MG/DL (ref 0.5–0.9)
EOSINOPHILS ABSOLUTE: 0.1 K/UL (ref 0–0.7)
EOSINOPHILS RELATIVE PERCENT: 2.6 %
GFR AFRICAN AMERICAN: >60
GFR NON-AFRICAN AMERICAN: >60
GLOBULIN: 3 G/DL (ref 2.3–3.5)
GLUCOSE BLD-MCNC: 87 MG/DL (ref 70–99)
HCT VFR BLD CALC: 36.3 % (ref 37–47)
HDLC SERPL-MCNC: 123 MG/DL (ref 40–59)
HEMOGLOBIN: 11.7 G/DL (ref 12–16)
LDL CHOLESTEROL CALCULATED: 121 MG/DL (ref 0–129)
LYMPHOCYTES ABSOLUTE: 1.3 K/UL (ref 1–4.8)
LYMPHOCYTES RELATIVE PERCENT: 27.8 %
MAGNESIUM: 2.2 MG/DL (ref 1.7–2.4)
MCH RBC QN AUTO: 30 PG (ref 27–31.3)
MCHC RBC AUTO-ENTMCNC: 32.2 % (ref 33–37)
MCV RBC AUTO: 93.1 FL (ref 82–100)
MONOCYTES ABSOLUTE: 0.5 K/UL (ref 0.2–0.8)
MONOCYTES RELATIVE PERCENT: 11.2 %
NEUTROPHILS ABSOLUTE: 2.6 K/UL (ref 1.4–6.5)
NEUTROPHILS RELATIVE PERCENT: 57.4 %
PDW BLD-RTO: 14.5 % (ref 11.5–14.5)
PLATELET # BLD: 270 K/UL (ref 130–400)
POTASSIUM SERPL-SCNC: 3.8 MEQ/L (ref 3.4–4.9)
RBC # BLD: 3.9 M/UL (ref 4.2–5.4)
SODIUM BLD-SCNC: 139 MEQ/L (ref 135–144)
TOTAL PROTEIN: 7.4 G/DL (ref 6.3–8)
TRIGL SERPL-MCNC: 98 MG/DL (ref 0–150)
TSH SERPL DL<=0.05 MIU/L-ACNC: 5.62 UIU/ML (ref 0.44–3.86)
VITAMIN D 25-HYDROXY: 14.8 NG/ML
WBC # BLD: 4.5 K/UL (ref 4.8–10.8)

## 2022-03-20 LAB — T4 TOTAL: 7.4 UG/DL (ref 4.5–10.9)

## 2022-04-15 ENCOUNTER — TELEPHONE (OUTPATIENT)
Dept: FAMILY MEDICINE CLINIC | Age: 57
End: 2022-04-15

## 2022-05-12 ENCOUNTER — APPOINTMENT (OUTPATIENT)
Dept: GENERAL RADIOLOGY | Age: 57
End: 2022-05-12
Payer: MEDICARE

## 2022-05-12 ENCOUNTER — HOSPITAL ENCOUNTER (EMERGENCY)
Age: 57
Discharge: HOME | End: 2022-05-12
Payer: MEDICARE

## 2022-05-12 VITALS
HEART RATE: 77 BPM | RESPIRATION RATE: 16 BRPM | DIASTOLIC BLOOD PRESSURE: 88 MMHG | SYSTOLIC BLOOD PRESSURE: 125 MMHG | WEIGHT: 145 LBS | TEMPERATURE: 98.5 F | BODY MASS INDEX: 24.16 KG/M2 | OXYGEN SATURATION: 98 % | HEIGHT: 65 IN

## 2022-05-12 DIAGNOSIS — S42.211A CLOSED DISPLACED FRACTURE OF SURGICAL NECK OF RIGHT HUMERUS, UNSPECIFIED FRACTURE MORPHOLOGY, INITIAL ENCOUNTER: Primary | ICD-10-CM

## 2022-05-12 PROCEDURE — 73030 X-RAY EXAM OF SHOULDER: CPT

## 2022-05-12 PROCEDURE — 73080 X-RAY EXAM OF ELBOW: CPT

## 2022-05-12 PROCEDURE — 99283 EMERGENCY DEPT VISIT LOW MDM: CPT

## 2022-05-12 PROCEDURE — 73060 X-RAY EXAM OF HUMERUS: CPT

## 2022-05-12 RX ORDER — ACETAMINOPHEN AND CODEINE PHOSPHATE 300; 30 MG/1; MG/1
1 TABLET ORAL EVERY 6 HOURS PRN
Qty: 10 TABLET | Refills: 0 | Status: SHIPPED | OUTPATIENT
Start: 2022-05-12 | End: 2022-05-15

## 2022-05-12 ASSESSMENT — PAIN DESCRIPTION - ORIENTATION: ORIENTATION: RIGHT

## 2022-05-12 ASSESSMENT — PAIN DESCRIPTION - FREQUENCY: FREQUENCY: CONTINUOUS

## 2022-05-12 ASSESSMENT — PAIN DESCRIPTION - DESCRIPTORS: DESCRIPTORS: THROBBING

## 2022-05-12 ASSESSMENT — PAIN - FUNCTIONAL ASSESSMENT: PAIN_FUNCTIONAL_ASSESSMENT: WONG-BAKER FACES

## 2022-05-12 ASSESSMENT — PAIN DESCRIPTION - LOCATION: LOCATION: ARM

## 2022-05-12 ASSESSMENT — ENCOUNTER SYMPTOMS
BACK PAIN: 0
ABDOMINAL PAIN: 0
COUGH: 0
SHORTNESS OF BREATH: 0

## 2022-05-12 ASSESSMENT — PAIN SCALES - WONG BAKER: WONGBAKER_NUMERICALRESPONSE: 8

## 2022-05-12 ASSESSMENT — PAIN DESCRIPTION - PAIN TYPE: TYPE: ACUTE PAIN

## 2022-05-12 NOTE — ED PROVIDER NOTES
[facility] ED  eMERGENCY dEPARTMENT eNCOUnter      Pt Name: John Bryant  MRN: [id number]  [place] 1965  Date of evaluation: 5/12/2022  Provider: BARBIE Brewer CNP      HISTORY OF PRESENT ILLNESS    John Bryant is a 61 y.o. female who presents to the Emergency Department with R arm pain after falling at workshop today. Patient is MR/DD and is not able to explain what exactly happed but states she fell. Pain is moderate. Denies any other injury. REVIEW OF SYSTEMS       Review of Systems   Constitutional: Negative for fever. HENT: Negative for congestion. Respiratory: Negative for cough and shortness of breath. Cardiovascular: Negative for chest pain. Gastrointestinal: Negative for abdominal pain. Genitourinary: Negative for dysuria. Musculoskeletal: Negative for arthralgias and back pain. R arm pain   Skin: Negative for rash. All other systems reviewed and are negative. PAST MEDICAL HISTORY     Past Medical History:   Diagnosis Date    Acquired hypothyroidism     Arthritis     Bacteremia due to Gram-negative bacteria 12/11/2016    Mental retardation, mild (I.Q. 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 classified elsewhere     Seizures ([facility] [address]          SURGICAL HISTORY       Past Surgical History:   Procedure Laterality Date    FRACTURE SURGERY      ORTHOPEDIC SURGERY Bilateral     ankle    TUBAL LIGATION           CURRENT MEDICATIONS       Previous Medications    ACETAMINOPHEN (TYLENOL) 325 MG TABLET    Take 2 tablets by mouth every 6 hours as needed for Pain or Fever    BRIVIACT 50 MG TABS    100 mg.  Take 100mg QAM and 200mg QPM    CLOBAZAM (ONFI) 10 MG TABS TABLET    TAKE ONE TABLET BY MOUTH EVERY 12 HOURS    LEVOTHYROXINE (SYNTHROID) 75 MCG TABLET    TAKE 1 TABLET BY MOUTH DAILY    RISPERIDONE (RISPERDAL) 3 MG TABLET    Take 3 mg by mouth 2 times daily    TOPIRAMATE (TOPAMAX) 200 MG TABLET    Take 200 mg by mouth 2 times daily    VITAMIN D (CHOLECALCIFEROL) 5000 UNITS CAPS CAPSULE    Take 1 capsule by mouth daily       ALLERGIES     Patient has no known allergies. FAMILY HISTORY       Family History   Problem Relation Age of Onset   Olimpia Small Stroke Mother     Thyroid Disease Mother     Diabetes Sister     Diabetes Brother     Hypertension Father     Stroke Father         ICH          SOCIAL HISTORY       Social History     Socioeconomic History    Marital status: Single     Spouse name: None    Number of children: None    Years of education: None    Highest education level: None   Occupational History    None   Tobacco Use    Smoking status: Never Smoker    Smokeless tobacco: Never Used   Vaping Use    Vaping Use: Never used   Substance and Sexual Activity    Alcohol use: No    Drug use: No    Sexual activity: Never   Other Topics Concern    None   Social History Narrative    None     Social Determinants of Health     Financial Resource Strain:     Difficulty of Paying Living Expenses: Not on file   Food Insecurity:     Worried About Running Out of Food in the Last Year: Not on file    Tang of Food in the Last Year: Not on file   Transportation Needs:     Lack of Transportation (Medical): Not on file    Lack of Transportation (Non-Medical):  Not on file   Physical Activity:     Days of Exercise per Week: Not on file    Minutes of Exercise per Session: Not on file   Stress:     Feeling of Stress : Not on file   Social Connections:     Frequency of Communication with Friends and Family: Not on file    Frequency of Social Gatherings with Friends and Family: Not on file    Attends Denominational Services: Not on file    Active Member of Clubs or Organizations: Not on file    Attends Club or Organization Meetings: Not on file    Marital Status: Not on file   Intimate Partner Violence:     Fear of Current or Ex-Partner: Not on file    Emotionally Abused: Not on file    Physically Abused: Not on file   Olimpia Small Sexually Abused: Not on file   Housing Stability:     Unable to Pay for Housing in the Last Year: Not on file    Number of Places Lived in the Last Year: Not on file    Unstable Housing in the Last Year: Not on file       SCREENINGS    Obi Coma Scale  Eye Opening: Spontaneous  Best Verbal Response: Oriented  Best Motor Response: Obeys commands  Sheldon Coma Scale Score: 15 @FLOW([id number])@      PHYSICAL EXAM    (up to 7 for level 4, 8 or more for level 5)     ED Triage Vitals [05/12/22 1656]   BP Temp Temp Source Pulse Resp SpO2 Height Weight   125/88 98.5 °F (36.9 °C) Oral 77 16 98 % 5' 5\" (1.651 m) 145 lb (65.8 kg)       Physical Exam  Vitals and nursing note reviewed. Constitutional:       Appearance: She is well-developed. HENT:      Head: Normocephalic and atraumatic. Right Ear: External ear normal.      Left Ear: External ear normal.   Eyes:      Conjunctiva/sclera: Conjunctivae normal.      Pupils: Pupils are equal, round, and reactive to light. Cardiovascular:      Rate and Rhythm: Normal rate and regular rhythm. Pulmonary:      Effort: Pulmonary effort is normal.      Breath sounds: Normal breath sounds. Abdominal:      General: Bowel sounds are normal. There is no distension. Palpations: Abdomen is soft. Tenderness: There is no abdominal tenderness. Musculoskeletal:         General: Normal range of motion. Right upper arm: Swelling and tenderness present. No deformity. Arms:       Cervical back: Normal range of motion and neck supple. Skin:     General: Skin is warm and dry. Neurological:      Mental Status: She is alert and oriented to person, place, and time. Deep Tendon Reflexes: Reflexes are normal and symmetric. Psychiatric:         Judgment: Judgment normal.           All other labs were within normal range or not returned as of this dictation.     EMERGENCY DEPARTMENT COURSE and DIFFERENTIALDIAGNOSIS/MDM:   Vitals:    Vitals:    05/12/22 1656 BP: 125/88   Pulse: 77   Resp: 16   Temp: 98.5 °F (36.9 °C)   TempSrc: Oral   SpO2: 98%   Weight: 145 lb (65.8 kg)   Height: 5' 5\" (1.651 m)            61 yr old female with R humeral neck fracture. Sling applied. F/U with ortho in 1 day. Tylenol #3 Rx was given to caregiver. Caregiver at bedside and verbalizes understanding. PROCEDURES:  Unless otherwise noted below, none     Procedures      FINAL IMPRESSION      1.  Closed displaced fracture of surgical neck of right humerus, unspecified fracture morphology, initial encounter          DISPOSITION/PLAN   DISPOSITION Decision To Discharge 05/12/2022 06:02:49 PM          BARBIE Alonzo CNP (electronically signed)  Attending Emergency Physician     BARBIE Alonzo CNP  05/12/22 8801

## 2022-05-20 DIAGNOSIS — E03.9 ACQUIRED HYPOTHYROIDISM: Chronic | ICD-10-CM

## 2022-05-20 RX ORDER — LEVOTHYROXINE SODIUM 0.07 MG/1
75 TABLET ORAL DAILY
Qty: 30 TABLET | Refills: 1 | Status: SHIPPED | OUTPATIENT
Start: 2022-05-20

## 2022-05-20 NOTE — TELEPHONE ENCOUNTER
Please call her caregiver. SHe missed her last appt with me. I will refill for 3 months. No additional refills until seen.

## 2022-05-20 NOTE — TELEPHONE ENCOUNTER
pharmacy requesting medication refill. Please approve or deny this request.    Rx requested:  Requested Prescriptions     Pending Prescriptions Disp Refills    levothyroxine (SYNTHROID) 75 MCG tablet [Pharmacy Med Name: LEVOTHYROXINE 0.075MG (75MCG) TABS] 30 tablet 0     Sig: TAKE 1 TABLET BY MOUTH DAILY         Last Office Visit:   4/14/2021      Next Visit Date:  No future appointments.

## 2022-05-23 RX ORDER — LEVOTHYROXINE SODIUM 0.07 MG/1
75 TABLET ORAL DAILY
Qty: 90 TABLET | OUTPATIENT
Start: 2022-05-23

## 2022-05-23 NOTE — TELEPHONE ENCOUNTER
pharmacy requesting medication refill. Please approve or deny this request.    Rx requested:  Requested Prescriptions     Pending Prescriptions Disp Refills    levothyroxine (SYNTHROID) 75 MCG tablet [Pharmacy Med Name: LEVOTHYROXINE 0.075MG (75MCG) TABS] 90 tablet      Sig: TAKE 1 TABLET BY MOUTH DAILY         Last Office Visit:   4/14/2021      Next Visit Date:  No future appointments.

## 2022-05-23 NOTE — TELEPHONE ENCOUNTER
1st attempt to reach patient. Called patient @ [phone] and left message on machine for patient to return call during normal business hours of 8:30 AM and 5 PM @ [phone].

## 2022-05-23 NOTE — TELEPHONE ENCOUNTER
This message was already addressed last week. Please look at messages and last RF dates prior to forwarding.

## 2022-06-08 ENCOUNTER — HOSPITAL ENCOUNTER (OUTPATIENT)
Dept: PHYSICAL THERAPY | Age: 57
Setting detail: THERAPIES SERIES
Discharge: HOME | End: 2022-06-08
Payer: MEDICARE

## 2022-06-08 PROCEDURE — 97110 THERAPEUTIC EXERCISES: CPT

## 2022-06-08 PROCEDURE — 97162 PT EVAL MOD COMPLEX 30 MIN: CPT

## 2022-06-08 ASSESSMENT — PAIN DESCRIPTION - PAIN TYPE: TYPE: ACUTE PAIN

## 2022-06-08 ASSESSMENT — PAIN DESCRIPTION - ONSET: ONSET: SUDDEN

## 2022-06-08 ASSESSMENT — PAIN DESCRIPTION - FREQUENCY: FREQUENCY: INTERMITTENT

## 2022-06-08 ASSESSMENT — PAIN - FUNCTIONAL ASSESSMENT: PAIN_FUNCTIONAL_ASSESSMENT: PREVENTS OR INTERFERES WITH ALL ACTIVE AND SOME PASSIVE ACTIVITIES

## 2022-06-08 ASSESSMENT — PAIN DESCRIPTION - LOCATION: LOCATION: SHOULDER

## 2022-06-08 ASSESSMENT — PAIN SCALES - WONG BAKER: WONGBAKER_NUMERICALRESPONSE: 2

## 2022-06-08 ASSESSMENT — PAIN DESCRIPTION - ORIENTATION: ORIENTATION: RIGHT

## 2022-06-08 NOTE — PROGRESS NOTES
[address]  PHYSICAL THERAPY PLAN OF CARE   [address], [address] [facility]         Ph: [phone]  Fax: [phone]    [x] Certification  [] Recertification [x]  Plan of Care  [] Progress Note [] Discharge      Referring Provider: Shane Luque MD     From:  Candace Palm, PT   Patient: Jm Youssef (52 y.o. female) : 1965 Date: 2022   Medical Diagnosis: Unspecified displaced fracture of surgical neck of unspecified humerus, initial encounter for closed fracture [S42.213A] Fx humeral neck  Treatment Diagnosis: decreased R shoulder ROM, UE strength, postural awareness, R shoulder pain, and decreased functional activity tolerance    Plan of Care/Certification Expiration Date: 22   Progress Report Period from:  2022  to 2022    Visits to Date: 1 No Show: 0 Cancelled Appts: 0    OBJECTIVE:   Short Term Goals - Time Frame for Short term goals: 2-3 weeks    Goals Current/Discharge status  Status   Short term goal 1: The pt will maintain R shoulder PROM flex/abd 90 and ER 30 within surgical MD restrictions to increase ease with AROM as applicable  PROM RUE (degrees)  R Shoulder Flex  0-180: 90  R Shoulder ABduction 0-180: 90  R Shoulder Ext Rotation  0-90: 30 New,Partially met   Short term goal 2: The pt will demo improved postural awarenes with ability to hold head upright 25% of the time to increase UE function  Poor awareness-ongoing New   Short term goal 3: The pt and caregiver will be indep/compliant with HEP for active wrist and elbow AROM as well as pendulums  ongoing New     Long Term Goals - Time Frame for Long term goals : 8-10 weeks  Goals Current/ Discharge status Status   Long term goal 1: The pt will have a increase in UEFI score >/=10 points in order to increase functional activity tolerance  New   Long term goal 2: The pt will demonstrate improved R UE strength >/=3/5 in order to return to work duties at [employer] (as able per MD restrictions) NT d/t current restrictions pper MD New   Long term goal 3: The pt will demo improved R shoulder AROM flex/abd >/=130*, ER/IR >/=60* in order to increase indep with ADL's  at PLOF (as able per MD restrictions) PROM RUE (degrees)  R Shoulder Flex  0-180: 90  R Shoulder ABduction 0-180: 90  R Shoulder Ext Rotation  0-90: 30 New   Long term goal 4: The pt/caregiver will be indep/compliant with HEP in order to self-manage symptoms upon D/C ongoing New     Body Structures, Functions, Activity Limitations Requiring Skilled Therapeutic Intervention: Increased pain,Decreased posture,Decreased ADL status,Decreased ROM,Decreased strength,Decreased fine motor control,Decreased coordination  Assessment: Pt presents with R surgical neck humeral fx 5/12/22 after having a seizure/fall. She currently presents with decreased R shoulder ROM, UE strength, postural awareness, R shoulder pain, and decreased functional activity tolerance. The pt currently demonstrates satisfactory R shoulder PROM in all planes per MD restrictions of 30/90/90. Pt would benefit from PT at a frequency of 1x per week for further elbow and wrist mobility as well as to ensure PROM remains satisfactory until f/u with MD 6/27/22 with anticipation to increase frequency at that point if further restrictions are removed. Pt will benefit from PT improve R shoulder mobility, R UE strength, and function in order to return to indep PLOF and work duties.   Therapy Prognosis: Good    PT Education: Goals;PT Role;Plan of Care;Evaluative findings  Patient Education: Pt and caregiver educated on current status and restrictions per MD    PLAN: [x] Evaluate and Treat  Frequency/Duration:  Plan Frequency: 1-2  Plan weeks: 6-8  Current Treatment Recommendations: Strengthening,ROM,Neuromuscular re-education,Manual Therapy - Soft Tissue Mobilization,Pain management,Return to work related activity,Home exercise program,Safety education & training,Patient/Caregiver education & training,Modalities,Positioning (Modalities MHP/CP ONLY)  Plan Comment: start 1x per week until f/u with MD 6/27/22     Precautions:  High falls risk, epilepsy, MRDD       Other position/activity restrictions: 30/90/90 PROM NO AROM until f/u with MD;  Pendulums, hand, and elbow AROM ok                  Patient Status:[x] Continue/ Initiate plan of Care    [] Discharge PT. Recommend pt continue with HEP. [] Additional visits requested, Please re-certify for additional visits:    [] Hold         Signature: Electronically signed by Dara Hartley PT on 6/8/22 at 10:11 AM EDT    If you have any questions or concerns, please don't hesitate to call. Thank you for your referral.    I have reviewed this plan of care and certify a need for medically necessary rehabilitation services.     Physician Signature:__________________________________________________________  Date:  Please sign and return

## 2022-06-08 NOTE — PROGRESS NOTES
[facility]  PHYSICAL THERAPY EVALUATION      Physical Therapy: Initial Evaluation    Patient: Luan Puentes (13 y.o.     female)   Examination Date:   Plan of Care Certification Period: 2022 to 22  Progress Note Counter: 1/10 (PN due 22)   :  1965 ;    Confirmed: Yes MRN: [id number]  CSN: [id number]   Insurance: Payor: MEDICARE / Plan: MEDICARE PART A AND B / Product Type: *No Product type* /   Insurance ID: [id number] - (Medicare) Secondary Insurance (if applicable): MEDICAID [place]   Referring Physician: Breanna Wong MD     PCP: Miguel A Galan DO Visits to Date/Visits Approved: 1 /      No Show/Cancelled Appts: 0 / 0     Medical Diagnosis: Unspecified displaced fracture of surgical neck of unspecified humerus, initial encounter for closed fracture [S42.213A] Fx humeral neck  Treatment Diagnosis: decreased R shoulder ROM, UE strength, postural awareness, R shoulder pain, and decreased functional activity tolerance     PERTINENT MEDICAL HISTORY   Patient Assessed for Rehabilitation Services: Yes       Medical History: Chart Reviewed: Yes   Past Medical History:   Diagnosis Date    Acquired hypothyroidism     Arthritis     Bacteremia due to Gram-negative bacteria 2016    Mental retardation, mild (I.Q. 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 classified elsewhere     Seizures ([facility] [address]      Surgical History:   Past Surgical History:   Procedure Laterality Date    FRACTURE SURGERY      ORTHOPEDIC SURGERY Bilateral     ankle    TUBAL LIGATION         Medications:   Current Outpatient Medications:     levothyroxine (SYNTHROID) 75 MCG tablet, TAKE 1 TABLET BY MOUTH DAILY, Disp: 30 tablet, Rfl: 1    BRIVIACT 50 MG TABS, 100 mg.  Take 100mg QAM and 200mg QPM, Disp: , Rfl: 3    cloBAZam (ONFI) 10 MG TABS tablet, TAKE ONE TABLET BY MOUTH EVERY 12 HOURS, Disp: , Rfl: 5    vitamin D (CHOLECALCIFEROL) 5000 units CAPS capsule, Take 1 capsule by mouth daily, Disp: 30 capsule, Rfl: 1    acetaminophen (TYLENOL) 325 MG tablet, Take 2 tablets by mouth every 6 hours as needed for Pain or Fever, Disp: 120 tablet, Rfl: 3    risperiDONE (RISPERDAL) 3 MG tablet, Take 3 mg by mouth 2 times daily, Disp: , Rfl:     topiramate (TOPAMAX) 200 MG tablet, Take 200 mg by mouth 2 times daily, Disp: , Rfl:   Allergies: Patient has no known allergies. SUBJECTIVE EXAMINATION     History obtained from[de-identified] (s),Chart Review,  (s):  (caregiver)   Family/Caregiver Present: Yes    Subjective History: Onset Date: 05/12/22  Subjective: Pt presents after a fall at workshop 5/12/22 d/t a seizure resulting in a R surgical neck fracture of the humerus. Pts caregiver reports she has epilepsy and takes medicine to help control though has been having increasing seizure activity recently. Pt wears a helmet for this reason. She was placed in sling and saw ortho who referred her to PT with restrictions of 30/90/90 PROM, no AROM until f/u, Ok for pendulums, wrist, and elbow AROM. Caregiver reports that pt does not c/o pain a lot and rarely takes medications.   Additional Pertinent Hx (if applicable):     Previous treatments prior to current episode?: Splinting  Any changes to allergies, medications, or have you had any medical procedures/ER visits since your last visit?: No  Comments: 6/27/22      Learning/Language: Learning  Does the patient/guardian have any barriers to learning?: Cognitive  Will there be a co-learner?: Yes  Does the co-learner have any barriers to learning?: No barriers  What is the preferred language of the co-learner?: English  What is the preferred language of the patient/guardian?: English  Is an  required?: No     Pain Screening    Pain Screening  Patient Currently in Pain: Yes  Pain Assessment: Padilla-Baker FACES  Padilla-Baker Pain Rating: Hurts a little bit  Pain Type: Acute pain  Pain Location: Shoulder  Pain Orientation: Right  Pain Frequency: Intermittent  Pain Onset: Sudden  Functional Pain Assessment: Prevents or interferes with all active and some passive activities    Functional Status    Social History:    Social History  Lives With: Home care staff  Type of Home:  (Group Home)    Occupation/Interests:   Occupation: Full time employment  Type of Occupation: [employer] [address]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  Job Duties: Prolonged standing  Leisure & Hobbies: Likes to go places, playing bord games, shopping, lottery    Prior Level of Function:   100 mod I     Current Level of Function:   75%      IADL Comments: Pts caregiver reports the pt was able to perform ADL's mod Indep only with supervision d/t seizure activity; currently able to eat and brush hair with L hand; requires assistance currently to bathe, dress, and toilet  ADL Assistance: Independent  Homemaking Assistance: Needs assistance  Homemaking Responsibilities: No  Ambulation Assistance: Independent  Transfer Assistance: Independent  Active : No  Additional Comments: Laundry and meals prepared for her at group home    Dominant Hand: : Right    OBJECTIVE EXAMINATION     Restrictions:         Position Activity Restriction  Other position/activity restrictions: 30/90/90 PROM NO AROM until f/u with MD;  Pendulums, hand, and elbow AROM ok    Review of Systems:  Vision: Impaired  Visual Deficits: Wears glasses  Hearing: Within functional limits  Follows Commands: Within Functional Limits    Observations:   General Observations  Description: severe FHP, rounded shoulders, protracted scaps, increased thoracic kyphosis; poor awareness or ability to self correct    Left AROM  Right AROM      AROM LUE (degrees)  L Shoulder Flexion 0-180: 130 with assist  L Shoulder ABduction 0-180: 148 with assist  L Shoulder Int Rotation  0-70: T9 with assist  L Shoulder Ext Rotation  0-90: base of occiput with assist NT d/t current restrictions         Left PROM  Right PROM      NT     PROM RUE (degrees)  R Shoulder Flex  0-180: 90  R Shoulder ABduction 0-180: 90  R Shoulder Ext Rotation  0-90: 30        Left Strength  Right Strength      Strength LUE  L Shoulder Flexion: 3/5  L Shoulder ABduction: 3/5  L Shoulder Internal Rotation: 3/5  L Shoulder External Rotation: 3/5  L Elbow Flexion: 3/5  L Elbow Extension: 3/5 NT d/t current restrictions      Outcomes Score:  Exam: UEFI (competed by caregiver) 26/80    Treatment:    Exercises:   Exercises  Exercise 1: PROM R shoulder 30/90/90 x10 mins  Exercise 2: pendulums*  Exercise 3: elbow/wrist AROM*  Exercise 4: putty squeeze*  Treatment Reasoning  Limitations addressed: Mobility  Modalities:  Modalities  Modalities:  (MHP/CP only PRN*)     *Indicates exercise,modality, or manual techniques to be initiated when appropriate     ASSESSMENT     Impression: Assessment: Pt presents with R surgical neck humeral fx 5/12/22 after having a seizure/fall. She currently presents with decreased R shoulder ROM, UE strength, postural awareness, R shoulder pain, and decreased functional activity tolerance. The pt currently demonstrates satisfactory R shoulder PROM in all planes per MD restrictions of 30/90/90. Pt would benefit from PT at a frequency of 1x per week for further elbow and wrist mobility as well as to ensure PROM remains satisfactory until f/u with MD 6/27/22 with anticipation to increase frequency at that point if further restrictions are removed. Pt will benefit from PT improve R shoulder mobility, R UE strength, and function in order to return to indep PLOF and work duties.     Body Structures, Functions, Activity Limitations Requiring Skilled Therapeutic Intervention: Increased pain,Decreased posture,Decreased ADL status,Decreased ROM,Decreased strength,Decreased fine motor control,Decreased coordination    Statement of Medical Necessity: Physical Therapy is both indicated and medically necessary as outlined in the POC to increase the likelihood of meeting the functionally related goals stated below. Patient's Activity Tolerance: Patient tolerated evaluation without incident      Patient's rehabilitation potential/prognosis is considered to be: Good    Factors which may impact rehabilitation potential include: Medical co-morbidities,Cognitive function,Education, learning barriers     Patient Education: PT Education: Katarzyna Celeste of Care,Evaluative findings  Patient Education: Pt and caregiver educated on current status and restrictions per MD     GOALS   Patient Goal(s): Patient goals :  \"Get back to work\"    Short Term Goals Completed by 2-3 weeks Goal Status   STG 1The pt will maintain R shoulder PROM flex/abd 90 and ER 30 within surgical MD restrictions to increase ease with AROM as applicable New,Partially met   STG 2 The pt will demo improved postural awarenes with ability to hold head upright 25% of the time to increase UE function New   STG 3 The pt and caregiver will be indep/compliant with HEP for active wrist and elbow AROM as well as pendulums New       Long Term Goals Completed by 8-10 weeks Goal Status   LTG 1 The pt will have a increase in UEFI score >/=10 points in order to increase functional activity tolerance New   LTG 2 The pt will demonstrate improved R UE strength >/=3/5 in order to return to work duties at [employer] (as able per MD restrictions) New   LTG 3 The pt will demo improved R shoulder AROM flex/abd >/=130*, ER/IR >/=60* in order to increase indep with ADL's  at [facility] (as able per MD restrictions) New   LTG 4 The pt/caregiver will be indep/compliant with HEP in order to self-manage symptoms upon D/C New     TREATMENT PLAN       Requires PT Follow-Up: Yes  Treatment Initiated : PROM    Treatment may include any combination of the following: Strengthening,ROM,Neuromuscular re-education,Manual Therapy - Soft Tissue Mobilization,Pain management,Return to work related activity,Home exercise program,Safety education & training,Patient/Caregiver education & training,Modalities,Positioning (Modalities MHP/CP ONLY)     Frequency / Duration:  Patient to be seen 1-2 times per week for 6-8 weeks  Plan Comment: Pt and caregiver educated on current status and restrictions per MD  start 1x per week until f/u with MD 6/27/22          Eval Complexity:   Decision Making: Medium Complexity  History: Personal Factors and/or Comorbidities Impacting POC: High  Examination of body system(s) including body structures and functions, activity limitations, and/or participation restrictions: High  Exam: UEFI (competed by caregiver) 26/80  Clinical Presentation: Medium    POST-PAIN     Pain Rating (0-10 pain scale):  same /10  Location and pain description same as pre-treatment unless indicated. Action: [x] NA  [] Call Physician  [] Perform HEP  [] Meds as prescribed    Evaluation and patient rights have been reviewed and patient agrees with plan of care. Yes  [x]  No  []   Explain:     [place] Fall Risk Assessment  Risk Factor Scale  Score   History of Falls [x] Yes  [] No 25  0 25   Secondary Diagnosis [x] Yes  [] No 15  0 15   Ambulatory Aid [] Furniture  [] Crutches/cane/walker  [x] None/bedrest/wheelchair/nurse 30  15  0    IV/Heparin Lock [] Yes  [x] No 20  0    Gait/Transferring [] Impaired  [] Weak  [x] Normal/bedrest/immobile 20  10  0    Mental Status [x] Forgets limitations  [] Oriented to own ability 15  0 15      Total: 55     Based on the Assessment score: check the appropriate box.   []  No intervention needed   Low =   Score of 0-24  []  Use standard prevention interventions Moderate =  Score of 24-44   [] Discuss fall prevention strategies   [] Indicate moderate falls risk on eval  [x]  Use high risk prevention interventions High = Score of 45 and higher   [x] Discuss fall prevention strategies   [x] Provide supervision during treatment time      Minutes:  PT Individual Minutes  Time In: 0915  Time Out: 7360  Minutes: 35  Timed Code Treatment Minutes: 10 Minutes  Procedure Minutes: 25     Timed Activity Minutes Units   Ther Ex 10 1     Electronically signed by Eric Snow PT on 6/8/22 at 10:18 AM EDT

## 2022-06-14 ENCOUNTER — HOSPITAL ENCOUNTER (OUTPATIENT)
Dept: PHYSICAL THERAPY | Age: 57
Setting detail: THERAPIES SERIES
Discharge: HOME | End: 2022-06-14
Payer: MEDICARE

## 2022-06-14 LAB
BILIRUBIN URINE: NEGATIVE
BLOOD, URINE: NEGATIVE
CLARITY: CLEAR
COLOR: YELLOW
GLUCOSE URINE: NEGATIVE MG/DL
KETONES, URINE: NEGATIVE MG/DL
LEUKOCYTE ESTERASE, URINE: NEGATIVE
NITRITE, URINE: NEGATIVE
PH UA: 7 (ref 5–9)
PROTEIN UA: NEGATIVE MG/DL
SPECIFIC GRAVITY UA: 1.01 (ref 1–1.03)
UROBILINOGEN, URINE: 0.2 E.U./DL

## 2022-06-14 PROCEDURE — 97110 THERAPEUTIC EXERCISES: CPT

## 2022-06-14 ASSESSMENT — PAIN DESCRIPTION - LOCATION: LOCATION: SHOULDER

## 2022-06-14 ASSESSMENT — PAIN DESCRIPTION - ORIENTATION: ORIENTATION: RIGHT

## 2022-06-14 ASSESSMENT — PAIN SCALES - WONG BAKER: WONGBAKER_NUMERICALRESPONSE: 2

## 2022-06-14 NOTE — PROGRESS NOTES
measurements:  PROM RUE (degrees)  R Shoulder Flex  0-180: 90  R Shoulder ABduction 0-180: 90  R Shoulder Ext Rotation  0-90: 30    Assessment: Body Structures, Functions, Activity Limitations Requiring Skilled Therapeutic Intervention: Increased pain,Decreased posture,Decreased ADL status,Decreased ROM,Decreased strength,Decreased fine motor control,Decreased coordination  Assessment: Initiated R wrist/elbow/forearm AROM as well as pendulums and postural exercise to furthe rimprove UE biomechnics as able. Pt conts to demo good PROM WNL's of current MD restricitons. Provided pt and caregiver with HEP with good understanding. Treatment Diagnosis: decreased R shoulder ROM, UE strength, postural awareness, R shoulder pain, and decreased functional activity tolerance  Therapy Prognosis: Good     Patient Education: [] NA  Patient Education: Pt caregiver educated on and provided with hEP with good understanding    Post-Pain Assessment:       Pain Rating (0-10 pain scale):   /10   Location and pain description same as pre-treatment unless indicated. Action: [] NA   [x] Perform HEP  [] Meds as prescribed  [] Modalities as prescribed   [] Call Physician     GOALS   Patient Goal(s): Patient goals :  \"Get back to work\"    Short Term Goals Completed by 2-3 weeks Goal Status   STG 1 The pt will maintain R shoulder PROM flex/abd 90 and ER 30 within surgical MD restrictions to increase ease with AROM as applicable Partially met,In progress   STG 2 The pt will demo improved postural awarenes with ability to hold head upright 25% of the time to increase UE function In progress   STG 3 The pt and caregiver will be indep/compliant with HEP for active wrist and elbow AROM as well as pendulums In progress     Long Term Goals Completed by 8-10 weeks Goal Status   LTG 1 The pt will have a increase in UEFI score >/=10 points in order to increase functional activity tolerance In progress   LTG 2 The pt will demonstrate improved R UE strength >/=3/5 in order to return to work duties at [employer] (as able per MD restrictions) Deferred   LTG 3 The pt will demo improved R shoulder AROM flex/abd >/=130*, ER/IR >/=60* in order to increase indep with ADL's  at [facility] (as able per MD restrictions) Deferred   LTG 4 The pt/caregiver will be indep/compliant with HEP in order to self-manage symptoms upon D/C In progress     Plan:  Frequency/Duration:  Plan  Plan Frequency: 1-2  Plan weeks: 6-8  Current Treatment Recommendations: Strengthening,ROM,Neuromuscular re-education,Manual Therapy - Soft Tissue Mobilization,Pain management,Return to work related activity,Home exercise program,Safety education & training,Patient/Caregiver education & training,Modalities,Positioning (Modalities MHP/CP ONLY)  Plan Comment: start 1x per week until f/u with MD 6/27/22  Pt to continue current HEP. See objective section for any therapeutic exercise changes, additions or modifications this date.     Therapy Time:   PT Individual Minutes  Time In: 9151  Time Out: 8916  Minutes: 39  Timed Code Treatment Minutes: 39 Minutes  Procedure Minutes: 0  Timed Activity Minutes Units   Ther Ex 39 3     Electronically signed by Yeny Darnell PT on 6/14/22 at 10:38 AM EDT

## 2022-06-21 ENCOUNTER — OFFICE VISIT (OUTPATIENT)
Dept: FAMILY MEDICINE CLINIC | Age: 57
End: 2022-06-21
Payer: MEDICARE

## 2022-06-21 ENCOUNTER — HOSPITAL ENCOUNTER (OUTPATIENT)
Dept: PHYSICAL THERAPY | Age: 57
Setting detail: THERAPIES SERIES
Discharge: HOME | End: 2022-06-21
Payer: MEDICARE

## 2022-06-21 VITALS
SYSTOLIC BLOOD PRESSURE: 116 MMHG | OXYGEN SATURATION: 98 % | TEMPERATURE: 96.7 F | DIASTOLIC BLOOD PRESSURE: 68 MMHG | HEART RATE: 74 BPM
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
## 2022-06-21 PROCEDURE — 1036F TOBACCO NON-USER: CPT | Performed by: INTERNAL MEDICINE

## 2022-06-21 PROCEDURE — 3017F COLORECTAL CA SCREEN DOC REV: CPT | Performed by: INTERNAL MEDICINE

## 2022-06-21 PROCEDURE — G8428 CUR MEDS NOT DOCUMENT: HCPCS | Performed by: INTERNAL MEDICINE

## 2022-06-21 PROCEDURE — 97110 THERAPEUTIC EXERCISES: CPT

## 2022-06-21 PROCEDURE — G8420 CALC BMI NORM PARAMETERS: HCPCS | Performed by: INTERNAL MEDICINE

## 2022-06-21 PROCEDURE — 99213 OFFICE O/P EST LOW 20 MIN: CPT | Performed by: INTERNAL MEDICINE

## 2022-06-21 ASSESSMENT — PAIN DESCRIPTION - ORIENTATION: ORIENTATION: RIGHT

## 2022-06-21 ASSESSMENT — ENCOUNTER SYMPTOMS
BACK PAIN: 0
APNEA: 0
PHOTOPHOBIA: 0
RHINORRHEA: 0
COUGH: 0
DIARRHEA: 0
EYE REDNESS: 0
SHORTNESS OF BREATH: 0
BLOOD IN STOOL: 0
SINUS PRESSURE: 0
ABDOMINAL PAIN: 0
SINUS PAIN: 0
FACIAL SWELLING: 0
CHEST TIGHTNESS: 0
TROUBLE SWALLOWING: 0
NAUSEA: 0
SORE THROAT: 0
RECTAL PAIN: 0
EYE ITCHING: 0
COLOR CHANGE: 0
VOICE CHANGE: 0
EYE PAIN: 0
EYE DISCHARGE: 0
ABDOMINAL DISTENTION: 0
CONSTIPATION: 0
WHEEZING: 0
VOMITING: 0

## 2022-06-21 ASSESSMENT — PAIN DESCRIPTION - PAIN TYPE: TYPE: ACUTE PAIN

## 2022-06-21 ASSESSMENT — PAIN SCALES - WONG BAKER: WONGBAKER_NUMERICALRESPONSE: 2

## 2022-06-21 ASSESSMENT — PAIN DESCRIPTION - LOCATION: LOCATION: SHOULDER

## 2022-06-21 NOTE — PROGRESS NOTES
Subjective:      Patient ID: Lucretia Frazier is a 61 y.o. female Established patient, here for evaluation of the following chief complaint(s):  Chief Complaint   Patient presents with    Other     fall       HPI  Fall: Pt fell while ambulating from her PT visit striking her right knee. A caregiver was holding the patient's left arm as she was falling. At present he denies polyuria,  Polydipsia, constitutional, sinus, visual, cardiopulmonary, urologic, gastrointestinal, immunologic/hematologic,additional musculoskeletal, neurologic,dermatologic, or psychiatric complaints. Current Outpatient Medications on File Prior to Visit   Medication Sig Dispense Refill    levothyroxine (SYNTHROID) 75 MCG tablet TAKE 1 TABLET BY MOUTH DAILY 30 tablet 1    BRIVIACT 50 MG TABS 100 mg. Take 100mg QAM and 200mg QPM  3    cloBAZam (ONFI) 10 MG TABS tablet TAKE ONE TABLET BY MOUTH EVERY 12 HOURS  5    vitamin D (CHOLECALCIFEROL) 5000 units CAPS capsule Take 1 capsule by mouth daily 30 capsule 1    acetaminophen (TYLENOL) 325 MG tablet Take 2 tablets by mouth every 6 hours as needed for Pain or Fever 120 tablet 3    risperiDONE (RISPERDAL) 3 MG tablet Take 3 mg by mouth 2 times daily      topiramate (TOPAMAX) 200 MG tablet Take 200 mg by mouth 2 times daily       No current facility-administered medications on file prior to visit. Patient has no known allergies. Review of Systems   Constitutional: Negative for chills, diaphoresis, fatigue and fever. HENT: Negative for congestion, dental problem, drooling, ear discharge, ear pain, facial swelling, hearing loss, mouth sores, nosebleeds, postnasal drip, rhinorrhea, sinus pressure, sinus pain, sneezing, sore throat, tinnitus, trouble swallowing and voice change. Eyes: Negative for photophobia, pain, discharge, redness, itching and visual disturbance. Respiratory: Negative for apnea, cough, chest tightness, shortness of breath and wheezing.     Cardiovascular: Negative for chest pain, palpitations and leg swelling. Gastrointestinal: Negative for abdominal distention, abdominal pain, blood in stool, constipation, diarrhea, nausea, rectal pain and vomiting. Endocrine: Negative for cold intolerance, heat intolerance, polydipsia, polyphagia and polyuria. Genitourinary: Negative for decreased urine volume, difficulty urinating, dysuria, flank pain, frequency, genital sores, hematuria and urgency. Musculoskeletal: Negative for arthralgias, back pain, gait problem, joint swelling, myalgias, neck pain and neck stiffness. Skin: Negative for color change, rash and wound. Allergic/Immunologic: Negative for environmental allergies and food allergies. Neurological: Negative for dizziness, tremors, seizures, syncope, facial asymmetry, speech difficulty, weakness, light-headedness, numbness and headaches. Hematological: Negative for adenopathy. Does not bruise/bleed easily. Psychiatric/Behavioral: Negative for agitation, confusion, decreased concentration, hallucinations, self-injury, sleep disturbance and suicidal ideas. The patient is not nervous/anxious. Objective:   /68 (Site: Left Upper Arm, Position: Sitting, Cuff Size: Small Adult)   Pulse 74   Temp (!) 96.7 °F (35.9 °C)   LMP 12/10/2000 Comment: menop  SpO2 98%     Physical Exam  Constitutional:       General: She is not in acute distress. Appearance: She is well-developed. HENT:      Head: Normocephalic. Right Ear: External ear normal.      Left Ear: External ear normal.   Eyes:      Conjunctiva/sclera: Conjunctivae normal.   Neck:      Vascular: No JVD. Trachea: No tracheal deviation. Cardiovascular:      Rate and Rhythm: Normal rate and regular rhythm. Heart sounds: Normal heart sounds. Pulmonary:      Effort: Pulmonary effort is normal. No respiratory distress. Breath sounds: Normal breath sounds. No wheezing or rales. Chest:      Chest wall: No tenderness. Abdominal:      General: Bowel sounds are normal. There is no distension. Palpations: Abdomen is soft. There is no mass. Tenderness: There is no abdominal tenderness. There is no guarding or rebound. Musculoskeletal:         General: No tenderness or deformity. Cervical back: Neck supple. Skin:     General: Skin is warm and dry. Coloration: Skin is not pale. Findings: No erythema or rash. Neurological:      Mental Status: She is alert and oriented to person, place, and time. Motor: No abnormal muscle tone. Psychiatric:         Thought Content: Thought content normal.         Judgment: Judgment normal.         Assessment:       Diagnosis Orders   1. Fall, initial encounter          No results found for: LIPIDPAN, BMP, CMP, CBC, CBCAUTODIF  Plan:      Nori Mcgovern was seen today for other. Diagnoses and all orders for this visit:    Fall, initial encounter         No follow-ups on file. On this date 06/21/22 I have spent 30 minutes reviewing previous notes, test results and face to face with the patient discussing the diagnosis and importance of compliance with the treatment plan. Ashia Jones MD    Please note, this report has been partially produced using speech recognition software  and may cause  and /or contain errors related to that system including grammar, punctuation and spelling as well as words and phrases that may seem inappropriate. If there are questions or concerns please feel free to contact me to clarify.

## 2022-06-21 NOTE — PROGRESS NOTES
[facility]  Outpatient Physical Therapy    Treatment Note        Date: 2022  Patient: Go Mckoy  : 1965   Confirmed: Yes  MRN: [id number]  Referring Provider: Tristin Reynolds MD   Secondary Referring Provider (If applicable):     Medical Diagnosis: Unspecified displaced fracture of surgical neck of unspecified humerus, initial encounter for closed fracture [S42.213A]    Treatment Diagnosis: decreased R shoulder ROM, UE strength, postural awareness, R shoulder pain, and decreased functional activity tolerance    Visit Information:  Insurance: Payor: Tye Sweeney / Plan: MEDICARE PART A AND B / Product Type: *No Product type* /   PT Visit Information  Onset Date: 22  Total # of Visits to Date: 3  Plan of Care/Certification Expiration Date: 22  No Show: 0  Progress Note Due Date: 22  Canceled Appointment: 0  Progress Note Counter: 3/10 (PN due 22)    Subjective Information:  Subjective: Pt's caregiver reports fair-good compliance w/ HEP. Pt continues to not complain of any pain at home. HEP Compliance:  [x] Good [x] Fair [] Poor [] Reports not doing due to:    Pain Screening  Patient Currently in Pain: Yes  Pain Assessment: Padilla-Baker FACES  Padilla-Baker Pain Rating: Hurts a little bit  Pain Type: Acute pain  Pain Location: Shoulder  Pain Orientation: Right    Treatment:  Exercises:  Exercises  Exercise 1: PROM R shoulder 30/90/90 x10 mins  Exercise 2: pendulums 4 way x10 with sBA and TC\"s for circular directions  Exercise 3: wrist AROM x20 4 way  Exercise 4: putty squeeze yellow x2 mins  Exercise 5: forearm pronation/supination x20  Exercise 6: elbow AROM 3 way x10-VC's for end range ext  Exercise 7: Sitting holding upright posture x2 mins  Exercise 8: HEP: cont current  Treatment Reasoning  Limitations addressed:  Mobility,Posture    *Indicates exercise, modality, or manual techniques to be initiated when appropriate    Objective Measures:      Strength: [x] NT  [] MMT completed:    ROM: [] NT  [x] ROM measurements:    PROM RUE (degrees)  R Shoulder Flex  0-180: 90  R Shoulder ABduction 0-180: 90  R Shoulder Ext Rotation  0-90: 30    Balance: [x] NT       Observations: [x] NT       Assessment: Body Structures, Functions, Activity Limitations Requiring Skilled Therapeutic Intervention: Increased pain,Decreased posture,Decreased ADL status,Decreased ROM,Decreased strength,Decreased fine motor control,Decreased coordination  Assessment: Pt still meeting PROM goals within restrictions at this time. Pt very tired throughout session requiring cues for posture and attention to task. Pt denies any increase in pain throughout session. Treatment Diagnosis: decreased R shoulder ROM, UE strength, postural awareness, R shoulder pain, and decreased functional activity tolerance  Therapy Prognosis: Good       Patient Education: [x] NA       Post-Pain Assessment:       Pain Rating (0-10 pain scale):   0/10   Location and pain description same as pre-treatment unless indicated. Action: [] NA   [x] Perform HEP  [] Meds as prescribed  [] Modalities as prescribed   [] Call Physician     GOALS   Patient Goal(s): Patient goals :  \"Get back to work\"    Short Term Goals Completed by 2-3 weeks Goal Status   STG 1 The pt will maintain R shoulder PROM flex/abd 90 and ER 30 within surgical MD restrictions to increase ease with AROM as applicable Partially met,In progress   STG 2 The pt will demo improved postural awarenes with ability to hold head upright 25% of the time to increase UE function In progress   STG 3 The pt and caregiver will be indep/compliant with HEP for active wrist and elbow AROM as well as pendulums In progress       Long Term Goals Completed by 8-10 weeks Goal Status   LTG 1 The pt will have a increase in UEFI score >/=10 points in order to increase functional activity tolerance In progress   LTG 2 The pt will demonstrate improved R UE strength >/=3/5 in order to return to work duties at [employer] (as able per MD restrictions) Deferred   LTG 3 The pt will demo improved R shoulder AROM flex/abd >/=130*, ER/IR >/=60* in order to increase indep with ADL's  at [facility] (as able per MD restrictions) Deferred   LTG 4 The pt/caregiver will be indep/compliant with HEP in order to self-manage symptoms upon D/C In progress          Plan:  Frequency/Duration:  Plan  Plan Frequency: 1-2  Plan weeks: 6-8  Current Treatment Recommendations: Strengthening,ROM,Neuromuscular re-education,Manual Therapy - Soft Tissue Mobilization,Pain management,Return to work related activity,Home exercise program,Safety education & training,Patient/Caregiver education & training,Modalities,Positioning (Modalities MHP/CP ONLY)  Plan Comment: start 1x per week until f/u with MD 6/27/22  Pt to continue current HEP. See objective section for any therapeutic exercise changes, additions or modifications this date.     Therapy Time:      PT Individual Minutes  Time In: 0947  Time Out: 1010  Minutes: 38  Timed Code Treatment Minutes: 38 Minutes  Procedure Minutes:0    Timed Activity Minutes Units   Ther Ex 38 3     Electronically signed by Norman Prescott PTA on 6/21/22 at 10:16 AM EDT

## 2022-06-29 ENCOUNTER — HOSPITAL ENCOUNTER (OUTPATIENT)
Dept: PHYSICAL THERAPY | Age: 57
Setting detail: THERAPIES SERIES
Discharge: HOME | End: 2022-06-29
Payer: MEDICARE

## 2022-06-29 PROCEDURE — 97110 THERAPEUTIC EXERCISES: CPT

## 2022-06-29 ASSESSMENT — PAIN SCALES - WONG BAKER: WONGBAKER_NUMERICALRESPONSE: 2

## 2022-06-29 NOTE — PROGRESS NOTES
[facility] [facility]  Outpatient Physical Therapy    Treatment Note        Date: 2022  Patient: Julianna Coleman  : 1965   Confirmed: Yes  MRN: [id number]  Referring Provider: Enrico Lemon MD   Secondary Referring Provider (If applicable):     Medical Diagnosis: Unspecified displaced fracture of surgical neck of unspecified humerus, initial encounter for closed fracture [S42.213A]    Treatment Diagnosis: decreased R shoulder ROM, UE strength, postural awareness, R shoulder pain, and decreased functional activity tolerance    Visit Information:  Insurance: Payor: Ana Santana / Plan: MEDICARE PART A AND B / Product Type: *No Product type* /   PT Visit Information  Onset Date: 22  Total # of Visits to Date: 4  Plan of Care/Certification Expiration Date: 22  No Show: 0  Progress Note Due Date: 22  Canceled Appointment: 0  Progress Note Counter: 4/10 (PN due 22)    Subjective Information:  Subjective: Pt reports it hurts \"a little bit, not much though. \" Adiel Lakhani been doing exercises at home. Caregiver reports no injury from fall while leaving building after lasp appt. Pts caregiver reports she is slowly allowed to start weaning out of sling though hasn't started yet. HEP Compliance:  [x] Good [] Fair [] Poor [] Reports not doing due to:    Pain Screening  Padilla-Brennan Pain Rating: Hurts a little bit    Treatment:  Exercises:  Exercises  Exercise 1: PROM R shoulder all planes x10 mins  Exercise 2: pendulums 4 way x15 with SBA and TC\"s for circular directions- D/C NV  Exercise 3: pulleys flexion x3 mins  Exercise 4: table slides flex/scaption x10 ea. Exercise 5: scap retract, shrugs, rolls x10  Exercise 6: seated wand flexion, ER x10 ea. Exercise 7: wand Ext/IR*  Exercise 8: finger ladder*  Exercise 9: Reaching tasks*  Exercise 12: HEP: table slides, scap retract  Treatment Reasoning  Limitations addressed:  Mobility,Posture    *Indicates exercise, modality, or manual techniques to be initiated when appropriate    Objective Measures:      Strength: [x] NT  [] MMT completed:    ROM: [] NT  [x] ROM measurements:  PROM RUE (degrees)  R Shoulder Flex  0-180: 150  R Shoulder ABduction 0-180: 140  R Shoulder Int Rotation  0-70: 50 @ 90* abd  R Shoulder Ext Rotation  0-90: 60 @90* abd    Balance: [x] NT     Observations: [x] NT    Assessment: Body Structures, Functions, Activity Limitations Requiring Skilled Therapeutic Intervention: Increased pain,Decreased posture,Decreased ADL status,Decreased ROM,Decreased strength,Decreased fine motor control,Decreased coordination  Assessment: Pt demos very good progress with R shoulder mobility with greatly improved PROM noted since MD removed restrictions. Initiated R shoulder AAROM exercises and further progressed postural ex's with good tolerance. Pt without pain reported after tx session. Treatment Diagnosis: decreased R shoulder ROM, UE strength, postural awareness, R shoulder pain, and decreased functional activity tolerance  Therapy Prognosis: Good     Patient Education: [] NA  PT Education: Home Exercise Program  Patient Education: Pt and caregiver educvated on updated HEP/Prior HEP ex's to discontinue including pendulums and wrist AROM; Ok to start taking sling off around house and for sleeping, continue to wear in public only for safety    Post-Pain Assessment:       Pain Rating (0-10 pain scale):   0/10   Location and pain description same as pre-treatment unless indicated. Action: [] NA   [x] Perform HEP  [] Meds as prescribed  [] Modalities as prescribed   [] Call Physician     GOALS   Patient Goal(s): Patient goals :  \"Get back to work\"    Short Term Goals Completed by 2-3 weeks Goal Status   STG 1 The pt will maintain R shoulder PROM flex/abd 90 and ER 30 within surgical MD restrictions to increase ease with AROM as applicable Met   STG 2 The pt will demo improved postural awarenes with ability to hold head upright 25% of the time to increase UE function In progress   STG 3 The pt and caregiver will be indep/compliant with HEP for active wrist and elbow AROM as well as pendulums Met     Long Term Goals Completed by 8-10 weeks Goal Status   LTG 1 The pt will have a increase in UEFI score >/=10 points in order to increase functional activity tolerance In progress   LTG 2 The pt will demonstrate improved R UE strength >/=3/5 in order to return to work duties at [email or web address] (as able per MD restrictions) In progress   LTG 3 The pt will demo improved R shoulder AROM flex/abd >/=130*, ER/IR >/=60* in order to increase indep with ADL's  at [facility] (as able per MD restrictions) In progress   LTG 4 The pt/caregiver will be indep/compliant with HEP in order to self-manage symptoms upon D/C In progress     Plan:  Frequency/Duration:  Plan  Plan Frequency: 1-2  Plan weeks: 6-8  Current Treatment Recommendations: Strengthening,ROM,Neuromuscular re-education,Manual Therapy - Soft Tissue Mobilization,Pain management,Return to work related activity,Home exercise program,Safety education & training,Patient/Caregiver education & training,Modalities,Positioning (Modalities MHP/CP ONLY)  Plan Comment: increase frequency to 2x per week  Pt to continue current HEP. See objective section for any therapeutic exercise changes, additions or modifications this date.     Therapy Time:      PT Individual Minutes  Time In: 5423  Time Out: 1430  Minutes: 43  Timed Code Treatment Minutes: 43 Minutes  Procedure Minutes:0  Timed Activity Minutes Units   Ther Ex 43 3     Electronically signed by Lisa Bee PT on 6/29/22 at 2:38 PM EDT

## 2022-07-04 ENCOUNTER — HOSPITAL ENCOUNTER (EMERGENCY)
Age: 57
Discharge: HOME | End: 2022-07-04
Attending: EMERGENCY MEDICINE
Payer: MEDICARE

## 2022-07-04 ENCOUNTER — APPOINTMENT (OUTPATIENT)
Dept: GENERAL RADIOLOGY | Age: 57
End: 2022-07-04
Payer: MEDICARE

## 2022-07-04 ENCOUNTER — APPOINTMENT (OUTPATIENT)
Dept: CT IMAGING | Age: 57
End: 2022-07-04
Payer: MEDICARE

## 2022-07-04 VITALS
BODY MASS INDEX: 24.16 KG/M2 | RESPIRATION RATE: 12 BRPM | DIASTOLIC BLOOD PRESSURE: 83 MMHG | HEIGHT: 65 IN | OXYGEN SATURATION: 93 % | TEMPERATURE: 98.3 F | HEART RATE: 78 BPM | WEIGHT: 145 LBS | SYSTOLIC BLOOD PRESSURE: 107 MMHG

## 2022-07-04 DIAGNOSIS — S42.209S CLOSED FRACTURE OF PROXIMAL END OF HUMERUS, UNSPECIFIED FRACTURE MORPHOLOGY, UNSPECIFIED LATERALITY, SEQUELA: ICD-10-CM

## 2022-07-04 DIAGNOSIS — R56.9 SEIZURE (HCC): Primary | ICD-10-CM

## 2022-07-04 LAB
ANION GAP SERPL CALCULATED.3IONS-SCNC: 14 MEQ/L (ref 9–15)
BASOPHILS ABSOLUTE: 0 K/UL (ref 0–0.2)
BASOPHILS RELATIVE PERCENT: 1.1 %
BUN BLDV-MCNC: 19 MG/DL (ref 6–20)
CALCIUM SERPL-MCNC: 8.8 MG/DL (ref 8.5–9.9)
CHLORIDE BLD-SCNC: 112 MEQ/L (ref 95–107)
CHP ED QC CHECK: YES
CO2: 18 MEQ/L (ref 20–31)
CREAT SERPL-MCNC: 0.75 MG/DL (ref 0.5–0.9)
EOSINOPHILS ABSOLUTE: 0.1 K/UL (ref 0–0.7)
EOSINOPHILS RELATIVE PERCENT: 1.6 %
GFR AFRICAN AMERICAN: >60
GFR NON-AFRICAN AMERICAN: >60
GLUCOSE BLD-MCNC: 127 MG/DL
GLUCOSE BLD-MCNC: 127 MG/DL (ref 70–99)
GLUCOSE BLD-MCNC: 96 MG/DL (ref 70–99)
HCT VFR BLD CALC: 32.4 % (ref 37–47)
HEMOGLOBIN: 10.7 G/DL (ref 12–16)
LYMPHOCYTES ABSOLUTE: 0.8 K/UL (ref 1–4.8)
LYMPHOCYTES RELATIVE PERCENT: 17.1 %
MCH RBC QN AUTO: 28.9 PG (ref 27–31.3)
MCHC RBC AUTO-ENTMCNC: 33 % (ref 33–37)
MCV RBC AUTO: 87.4 FL (ref 82–100)
MONOCYTES ABSOLUTE: 0.4 K/UL (ref 0.2–0.8)
MONOCYTES RELATIVE PERCENT: 8.5 %
NEUTROPHILS ABSOLUTE: 3.2 K/UL (ref 1.4–6.5)
NEUTROPHILS RELATIVE PERCENT: 71.7 %
PDW BLD-RTO: 16.1 % (ref 11.5–14.5)
PERFORMED ON: ABNORMAL
PLATELET # BLD: 324 K/UL (ref 130–400)
POTASSIUM REFLEX MAGNESIUM: 3.7 MEQ/L (ref 3.4–4.9)
RBC # BLD: 3.7 M/UL (ref 4.2–5.4)
SODIUM BLD-SCNC: 144 MEQ/L (ref 135–144)
WBC # BLD: 4.5 K/UL (ref 4.8–10.8)

## 2022-07-04 PROCEDURE — 70450 CT HEAD/BRAIN W/O DYE: CPT

## 2022-07-04 PROCEDURE — 99284 EMERGENCY DEPT VISIT MOD MDM: CPT

## 2022-07-04 PROCEDURE — 73070 X-RAY EXAM OF ELBOW: CPT

## 2022-07-04 PROCEDURE — 2580000003 HC RX 258: Performed by: EMERGENCY MEDICINE

## 2022-07-04 PROCEDURE — 73030 X-RAY EXAM OF SHOULDER: CPT

## 2022-07-04 PROCEDURE — 80048 BASIC METABOLIC PNL TOTAL CA: CPT

## 2022-07-04 PROCEDURE — 36415 COLL VENOUS BLD VENIPUNCTURE: CPT

## 2022-07-04 PROCEDURE — 85025 COMPLETE CBC W/AUTO DIFF WBC: CPT

## 2022-07-04 RX ORDER — KETOROLAC TROMETHAMINE 10 MG/1
10 TABLET, FILM COATED ORAL EVERY 6 HOURS PRN
Qty: 20 TABLET | Refills: 0 | Status: SHIPPED | OUTPATIENT
Start: 2022-07-04

## 2022-07-04 RX ORDER — 0.9 % SODIUM CHLORIDE 0.9 %
1000 INTRAVENOUS SOLUTION INTRAVENOUS ONCE
Status: COMPLETED | OUTPATIENT
Start: 2022-07-04 | End: 2022-07-04

## 2022-07-04 RX ADMIN — SODIUM CHLORIDE 1000 ML: 9 INJECTION, SOLUTION INTRAVENOUS at 20:28

## 2022-07-04 ASSESSMENT — ENCOUNTER SYMPTOMS
BACK PAIN: 0
ABDOMINAL PAIN: 0
PHOTOPHOBIA: 0

## 2022-07-04 ASSESSMENT — PAIN - FUNCTIONAL ASSESSMENT: PAIN_FUNCTIONAL_ASSESSMENT: NONE - DENIES PAIN

## 2022-07-04 NOTE — ED PROVIDER NOTES
[facility] ED  EMERGENCY DEPARTMENT ENCOUNTER      Pt Name: Dara Porter  MRN: [id number]  [place] 1965  Date of evaluation: 7/4/2022  Provider: Kira Mckinnon, [address] [place] [address]       Chief Complaint   Patient presents with    Seizures         HISTORY OF PRESENT ILLNESS   (Location/Symptom, Timing/Onset, Context/Setting, Quality, Duration, Modifying Factors, Severity)  Note limiting factors. Dara Porter is a 61 y.o. female who presents to the emergency department via EMS for evaluation of seizure. H/o congential encephalopathy, seizures, lives at a nursing facility. Patient states \" I had a seizure\". She is reporting that her right arm hurts. Unknown if this was witnessed per EMS or if medications were given at the facility prior to arrival.  Patient is now awake but sleepy. Denies headache or chest pain, difficulty breathing, abdominal pain, neck or back pain, numbness or weakness. Her sister notes that she broke her arm last month  HPI    Nursing Notes were reviewed. REVIEW OF SYSTEMS    (2-9 systems for level 4, 10 or more for level 5)     Review of Systems   Constitutional: Negative for fever. Seizure, right arm pain   Eyes: Negative for photophobia and visual disturbance. Cardiovascular: Negative for chest pain. Gastrointestinal: Negative for abdominal pain. Genitourinary: Negative for flank pain. Musculoskeletal: Negative for back pain and neck pain. Neurological: Negative for weakness, numbness and headaches. All other systems reviewed and are negative. Except as noted above the remainder of the review of systems was reviewed and negative.        PAST MEDICAL HISTORY     Past Medical History:   Diagnosis Date    Acquired hypothyroidism     Arthritis     Bacteremia due to Gram-negative bacteria 12/11/2016    Mental retardation, mild (I.Q. 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 classified elsewhere     Seizures ([facility] [address] SURGICAL HISTORY       Past Surgical History:   Procedure Laterality Date    FRACTURE SURGERY      ORTHOPEDIC SURGERY Bilateral     ankle    TUBAL LIGATION           CURRENT MEDICATIONS       Previous Medications    ACETAMINOPHEN (TYLENOL) 325 MG TABLET    Take 2 tablets by mouth every 6 hours as needed for Pain or Fever    BRIVIACT 50 MG TABS    100 mg. Take 100mg QAM and 200mg QPM    CLOBAZAM (ONFI) 10 MG TABS TABLET    TAKE ONE TABLET BY MOUTH EVERY 12 HOURS    LEVOTHYROXINE (SYNTHROID) 75 MCG TABLET    TAKE 1 TABLET BY MOUTH DAILY    RISPERIDONE (RISPERDAL) 3 MG TABLET    Take 3 mg by mouth 2 times daily    TOPIRAMATE (TOPAMAX) 200 MG TABLET    Take 200 mg by mouth 2 times daily    VITAMIN D (CHOLECALCIFEROL) 5000 UNITS CAPS CAPSULE    Take 1 capsule by mouth daily       ALLERGIES     Patient has no known allergies. FAMILY HISTORY       Family History   Problem Relation Age of Onset   Ada Se Stroke Mother     Thyroid Disease Mother     Diabetes Sister     Diabetes Brother     Hypertension Father     Stroke Father         ICH          SOCIAL HISTORY       Social History     Socioeconomic History    Marital status: Single     Spouse name: None    Number of children: None    Years of education: None    Highest education level: None   Occupational History    None   Tobacco Use    Smoking status: Never Smoker    Smokeless tobacco: Never Used   Vaping Use    Vaping Use: Never used   Substance and Sexual Activity    Alcohol use: No    Drug use: No    Sexual activity: Never   Other Topics Concern    None   Social History Narrative    None     Social Determinants of Health     Financial Resource Strain:     Difficulty of Paying Living Expenses: Not on file   Food Insecurity:     Worried About Running Out of Food in the Last Year: Not on file    Tang of Food in the Last Year: Not on file   Transportation Needs:     Lack of Transportation (Medical):  Not on file    Lack of Transportation (Non-Medical): Not on file   Physical Activity:     Days of Exercise per Week: Not on file    Minutes of Exercise per Session: Not on file   Stress:     Feeling of Stress : Not on file   Social Connections:     Frequency of Communication with Friends and Family: Not on file    Frequency of Social Gatherings with Friends and Family: Not on file    Attends Sabianism Services: Not on file    Active Member of [address] [place] [address] or Organizations: Not on file    Attends Club or Organization Meetings: Not on file    Marital Status: Not on file   Intimate Partner Violence:     Fear of Current or Ex-Partner: Not on file    Emotionally Abused: Not on file    Physically Abused: Not on file    Sexually Abused: Not on file   Housing Stability:     Unable to Pay for Housing in the Last Year: Not on file    Number of [place] in the Last Year: Not on file    Unstable Housing in the Last Year: Not on file       SCREENINGS         Obi Coma Scale  Eye Opening: Spontaneous  Best Verbal Response: Oriented  Best Motor Response: Obeys commands  [place] Coma Scale Score: 15                     CIWA Assessment  BP: (!) 139/39  Heart Rate: 93                 PHYSICAL EXAM    (up to 7 for level 4, 8 or more for level 5)     ED Triage Vitals   BP Temp Temp src Pulse Resp SpO2 Height Weight   -- -- -- -- -- -- -- --       Physical Exam  Constitutional:       General: She is not in acute distress. Appearance: She is not toxic-appearing or diaphoretic. Comments: Left forehead cephalhematoma, no palpable skull fracture. No midline vertebral tenderness or step-off. No reproducible chest wall abdominal tenderness. Pelvis stable. Right elbow ecchymosis.   GCS 15, airway intact, breath sounds equal, right lateral shoulder tenderness, full range of motion right elbow and right wrist, radial pulse intact, compartment soft, no lacerations, able to flex and extend fingers, radial/ulnar/median nerves intact good capillary refill neurovascular tact right upper extremity    HENT:      Head: Normocephalic. Nose: Nose normal.      Mouth/Throat:      Mouth: Mucous membranes are moist.   Eyes:      General: No scleral icterus. Extraocular Movements: Extraocular movements intact. Pupils: Pupils are equal, round, and reactive to light. Cardiovascular:      Rate and Rhythm: Normal rate and regular rhythm. Pulses: Normal pulses. Pulmonary:      Effort: Pulmonary effort is normal.      Breath sounds: Normal breath sounds. Abdominal:      Tenderness: There is no abdominal tenderness. There is no guarding or rebound. Musculoskeletal:         General: No deformity. Normal range of motion. Cervical back: Normal range of motion. No tenderness. Right lower leg: No edema. Left lower leg: No edema. Skin:     Capillary Refill: Capillary refill takes less than 2 seconds. Neurological:      General: No focal deficit present. Mental Status: She is alert and oriented to person, place, and time. Cranial Nerves: No cranial nerve deficit. Sensory: No sensory deficit. Motor: No weakness.          DIAGNOSTIC RESULTS       RADIOLOGY:   Non-plain film images such as CT, Ultrasound and MRI are read by the radiologist. Demetrius Cantrell radiographic images are visualized and preliminarily interpreted by the emergency physician with the below findings:    Elbow plain film with no displaced fracture or dislocation    Proximal humeral fracture- noted previously in MAY    I am unable to reduce this at the bedside in the emergency department, not indicated    No intracranial hemorrhage    Interpretation per the Radiologist below, if available at the time of this note:    [address]    (Results Pending)   XR SHOULDER RIGHT (MIN 2 VIEWS)    (Results Pending)   XR ELBOW RIGHT (2 VIEWS)    (Results Pending)         ED BEDSIDE ULTRASOUND:   Performed by ED Physician - none    LABS:  Labs Reviewed   CBC WITH AUTO DIFFERENTIAL - Abnormal; Notable for the following components:       Result Value    WBC 4.5 (*)     RBC 3.70 (*)     Hemoglobin 10.7 (*)     Hematocrit 32.4 (*)     RDW 16.1 (*)     Lymphocytes Absolute 0.8 (*)     All other components within normal limits   BASIC METABOLIC PANEL W/ REFLEX TO MG FOR LOW K - Abnormal; Notable for the following components:    Chloride 112 (*)     CO2 18 (*)     All other components within normal limits   POCT GLUCOSE - Abnormal; Notable for the following components:    POC Glucose 127 (*)     All other components within normal limits   POCT GLUCOSE - Normal       All other labs were within normal range or not returned as of this dictation. EMERGENCY DEPARTMENT COURSE and DIFFERENTIAL DIAGNOSIS/MDM:   Vitals:    Vitals:    07/04/22 1910 07/04/22 2000   BP: (!) 129/56 (!) 139/39   Pulse: (!) 106 93   Resp: 18 14   Temp: 98.3 °F (36.8 °C)    TempSrc: Oral    SpO2: 97% 96%   Weight: 145 lb (65.8 kg)    Height: 5' 5\" (1.651 m)          Baseline anemia. Nonspecific electrolyte derangement consistent with recent seizure. MDM  Patient with history of seizures presents status post seizure, imaging indicated to rule out traumatic injury. Patient awake and alert, neurovascular intact. Patient had a humeral fracture in May and has been seeing physical therapy for this-x-ray shows impacted humerus fracture, note from the radiologist that this is dislocated, radiologist on-call Dr. Cole Priest has reviewed the film, believes that it is subluxed, does not believe that there is indication for admission, that the patient is appropriate to be placed in a sling, have close outpatient follow-up and schedule a operative intervention at that time  Monitored here without further seizure episodes    REASSESSMENT      Back to baseline  Vascular intact right upper extremity      CONSULTS:  None    PROCEDURES:  Unless otherwise noted below, none     Procedures      FINAL IMPRESSION      1.  Seizure ([address] 2. Closed fracture of proximal end of humerus, unspecified fracture morphology, unspecified laterality, sequela          DISPOSITION/PLAN   DISPOSITION        PATIENT REFERRED TO:  Alma Schuster MD  [address] [facility], [address]  [address] [place] [id number]  [phone]    Call today        DISCHARGE MEDICATIONS:  New Prescriptions    KETOROLAC (TORADOL) 10 MG TABLET    Take 1 tablet by mouth every 6 hours as needed for Pain     Controlled Substances Monitoring:     No flowsheet data found.     (Please note that portions of this note were completed with a voice recognition program.  Efforts were made to edit the dictations but occasionally words are mis-transcribed.)    Renan Valle MD (electronically signed)  Attending Emergency Physician           Renan Valle MD  07/04/22 2200       Hailee Martinez MD  07/04/22 2200

## 2022-07-04 NOTE — ED TRIAGE NOTES
p arrives via EMS c/o seizure. Pt from [facility]. Pt has PMHx of seizure. Per EMS pt was not seizing on arrival and was given meds PTA, though they aren't sure what. Pt has PMHx of \"unspecified intellectual disability\" per group home paperwork. Pt A&Ox3, ABCs intact, skin, appropriate for ethnicity, warm, and dry.

## 2022-07-04 NOTE — ED NOTES
Call [address] at [facility] per paperwork sent with pt for ride home:    [phone]     Thomas Lai RN  07/04/22 1958

## 2022-07-05 NOTE — ED NOTES
Pt sister made aware of pt return to group home. She states she is going to call [facility] to update that pt is returning and of pt status.       Jaylen Underwood RN  07/04/22 7692

## 2022-07-05 NOTE — ED NOTES
Dr. Yonatan Sherwood speaking with nurse at [facility], updating on pt status, and needs upon return.  RN to RN report given, as well [facility]  07/04/22 2610

## 2022-07-05 NOTE — ED NOTES
Pt made aware of return back to [facility]. Verbalized understanding. VSS remain stable. Discharge documents at bedside with patient.       Jaylen Underwood RN  07/04/22 1850

## 2022-07-05 NOTE — PROGRESS NOTES
Therapy                            Cancellation/No-show Note      Date: 2022  Patient: Merline Azar (86 y.o. female)  : 1965  MRN:  [id number]  Referring Physician: Alyssia Braden MD     Medical Diagnosis: Unspecified displaced fracture of surgical neck of unspecified humerus, initial encounter for closed fracture [S42.213A]      Visit Information:  Visits to Date 4   No Show/Cancelled Appts: 0       For today's appointment patient:  [x]  Cancelled  []  Rescheduled appointment  []  No-show   []  Called pt to remind of next appointment     Reason given by patient:  []  Patient ill  []  Conflicting appointment  []  No transportation    []  Conflict with work  []  No reason given  [x]  Other:  LPN from pts group home called and stated that pt had another seizure yesterday and fell and re-broke her arm. Has an appt with the ortho tomorrow to find out next course of action. Will cancel appts this week and caregiver to call back in regard to plan with D/C vs hold vs continuation. [] Pt has future appointments scheduled, no follow up needed  [] Pt requests to be on hold.     Reason:   If > 2 weeks please discuss with therapist.  [] Therapist to call pt for follow up     Comments:       Signature: Electronically signed by Karen Shannon PT on 22 at 3:45 PM EDT

## 2022-07-05 NOTE — ED NOTES
Nano Duffy, sister, listed guardian, contacted with updates at this time.   [address] Jimi Dhaliwal RN  07/04/22 2059

## 2022-07-06 ENCOUNTER — OFFICE VISIT (OUTPATIENT)
Dept: ORTHOPEDIC SURGERY | Age: 57
End: 2022-07-06
Payer: MEDICARE

## 2022-07-06 ENCOUNTER — HOSPITAL ENCOUNTER (OUTPATIENT)
Dept: PHYSICAL THERAPY | Age: 57
Setting detail: THERAPIES SERIES
Discharge: HOME | End: 2022-07-06

## 2022-07-06 VITALS
TEMPERATURE: 98.4 F | HEART RATE: 68 BPM | BODY MASS INDEX: 24.16 KG/M2 | HEIGHT: 65 IN | WEIGHT: 145 LBS | OXYGEN SATURATION: 98 %

## 2022-07-06 DIAGNOSIS — S42.211A CLOSED DISPLACED FRACTURE OF SURGICAL NECK OF RIGHT HUMERUS, INITIAL ENCOUNTER: Primary | ICD-10-CM

## 2022-07-06 LAB
ALBUMIN SERPL-MCNC: 4.1 G/DL (ref 3.5–4.6)
ALP BLD-CCNC: 186 U/L (ref 40–130)
ALT SERPL-CCNC: 21 U/L (ref 0–33)
ANION GAP SERPL CALCULATED.3IONS-SCNC: 13 MEQ/L (ref 9–15)
AST SERPL-CCNC: 19 U/L (ref 0–35)
BASOPHILS ABSOLUTE: 0 K/UL (ref 0–0.2)
BASOPHILS RELATIVE PERCENT: 1 %
BILIRUB SERPL-MCNC: <0.2 MG/DL (ref 0.2–0.7)
BUN BLDV-MCNC: 13 MG/DL (ref 6–20)
CALCIUM SERPL-MCNC: 9.1 MG/DL (ref 8.5–9.9)
CHLORIDE BLD-SCNC: 110 MEQ/L (ref 95–107)
CHOLESTEROL, TOTAL: 252 MG/DL (ref 0–199)
CO2: 21 MEQ/L (ref 20–31)
CREAT SERPL-MCNC: 0.67 MG/DL (ref 0.5–0.9)
EOSINOPHILS ABSOLUTE: 0.1 K/UL (ref 0–0.7)
EOSINOPHILS RELATIVE PERCENT: 2.3 %
GFR AFRICAN AMERICAN: >60
GFR NON-AFRICAN AMERICAN: >60
GLOBULIN: 3.1 G/DL (ref 2.3–3.5)
GLUCOSE BLD-MCNC: 120 MG/DL (ref 70–99)
HCT VFR BLD CALC: 34.6 % (ref 37–47)
HDLC SERPL-MCNC: 120 MG/DL (ref 40–59)
HEMOGLOBIN: 10.9 G/DL (ref 12–16)
LDL CHOLESTEROL CALCULATED: 110 MG/DL (ref 0–129)
LYMPHOCYTES ABSOLUTE: 1.1 K/UL (ref 1–4.8)
LYMPHOCYTES RELATIVE PERCENT: 24.8 %
MAGNESIUM: 2.1 MG/DL (ref 1.7–2.4)
MCH RBC QN AUTO: 28.1 PG (ref 27–31.3)
MCHC RBC AUTO-ENTMCNC: 31.6 % (ref 33–37)
MCV RBC AUTO: 88.9 FL (ref 82–100)
MONOCYTES ABSOLUTE: 0.4 K/UL (ref 0.2–0.8)
MONOCYTES RELATIVE PERCENT: 9.4 %
NEUTROPHILS ABSOLUTE: 2.9 K/UL (ref 1.4–6.5)
NEUTROPHILS RELATIVE PERCENT: 62.5 %
PDW BLD-RTO: 16.8 % (ref 11.5–14.5)
PLATELET # BLD: 308 K/UL (ref 130–400)
POTASSIUM SERPL-SCNC: 3.2 MEQ/L (ref 3.4–4.9)
RBC # BLD: 3.89 M/UL (ref 4.2–5.4)
SODIUM BLD-SCNC: 144 MEQ/L (ref 135–144)
T4 FREE: 1.32 NG/DL (ref 0.84–1.68)
TOTAL PROTEIN: 7.2 G/DL (ref 6.3–8)
TRIGL SERPL-MCNC: 111 MG/DL (ref 0–150)
TSH REFLEX: 1.05 UIU/ML (ref 0.44–3.86)
WBC # BLD: 4.6 K/UL (ref 4.8–10.8)

## 2022-07-06 PROCEDURE — 23600 CLTX PROX HUMRL FX W/O MNPJ: CPT | Performed by: ORTHOPAEDIC SURGERY

## 2022-07-06 PROCEDURE — G8427 DOCREV CUR MEDS BY ELIG CLIN: HCPCS | Performed by: ORTHOPAEDIC SURGERY

## 2022-07-06 PROCEDURE — G8420 CALC BMI NORM PARAMETERS: HCPCS | Performed by: ORTHOPAEDIC SURGERY

## 2022-07-06 PROCEDURE — 99203 OFFICE O/P NEW LOW 30 MIN: CPT | Performed by: ORTHOPAEDIC SURGERY

## 2022-07-06 PROCEDURE — 3017F COLORECTAL CA SCREEN DOC REV: CPT | Performed by: ORTHOPAEDIC SURGERY

## 2022-07-06 PROCEDURE — 1036F TOBACCO NON-USER: CPT | Performed by: ORTHOPAEDIC SURGERY

## 2022-07-06 NOTE — PROGRESS NOTES
Subjective:      Patient ID: Shelley Camarillo is a 61 y.o. female who presents today for:  Chief Complaint   Patient presents with   [place] ED Follow-up     closed fracture of proximal end of humerus. Xray done 07/04/202       HPI  The patient is a 76-year-old female with MRDD who fell yesterday injuring her right shoulder while sustaining a seizure. She came to the emergency room here where x-rays revealed a moderately displaced fracture of the surgical neck of the right humerus. She had a similar fracture in May of this year that was being treated nonsurgically, by the paperwork that the patient has with her by Dr. Selvin Lawler. She was about to start PT for the shoulder apparently. She is here with one of her clinical caregivers.       Past Medical History:   Diagnosis Date    Acquired hypothyroidism     Arthritis     Bacteremia due to Gram-negative bacteria 12/11/2016    Mental retardation, mild (I.Q. 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 classified elsewhere     Seizures (HCC)      Past Surgical History:   Procedure Laterality Date    FRACTURE SURGERY      ORTHOPEDIC SURGERY Bilateral     ankle    TUBAL LIGATION       Social History     Socioeconomic History    Marital status: Single     Spouse name: Not on file    Number of children: Not on file    Years of education: Not on file    Highest education level: Not on file   Occupational History    Not on file   Tobacco Use    Smoking status: Never Smoker    Smokeless tobacco: Never Used   Vaping Use    Vaping Use: Never used   Substance and Sexual Activity    Alcohol use: No    Drug use: No    Sexual activity: Never   Other Topics Concern    Not on file   Social History Narrative    Not on file     Social Determinants of Health     Financial Resource Strain:     Difficulty of Paying Living Expenses: Not on file   Food Insecurity:     Worried About Running Out of Food in the Last Year: Not on file    Tang of Food in the Last Year: Not on file   Transportation Needs:     Lack of Transportation (Medical): Not on file    Lack of Transportation (Non-Medical): Not on file   Physical Activity:     Days of Exercise per Week: Not on file    Minutes of Exercise per Session: Not on file   Stress:     Feeling of Stress : Not on file   Social Connections:     Frequency of Communication with Friends and Family: Not on file    Frequency of Social Gatherings with Friends and Family: Not on file    Attends Yazidi Services: Not on file    Active Member of [address] [place] [address] [employer] or Organizations: Not on file    Attends Club or Organization Meetings: Not on file    Marital Status: Not on file   Intimate Partner Violence:     Fear of Current or Ex-Partner: Not on file    Emotionally Abused: Not on file    Physically Abused: Not on file    Sexually Abused: Not on file   Housing Stability:     Unable to Pay for Housing in the Last Year: Not on file    Number of [place] in the Last Year: Not on file    Unstable Housing in the Last Year: Not on file     Family History   Problem Relation Age of Onset    Stroke Mother     Thyroid Disease Mother     Diabetes Sister     Diabetes Brother     Hypertension Father     Stroke Father         ICH     No Known Allergies  Current Outpatient Medications on File Prior to Visit   Medication Sig Dispense Refill    ketorolac (TORADOL) 10 MG tablet Take 1 tablet by mouth every 6 hours as needed for Pain 20 tablet 0    levothyroxine (SYNTHROID) 75 MCG tablet TAKE 1 TABLET BY MOUTH DAILY 30 tablet 1    BRIVIACT 50 MG TABS 100 mg.  Take 100mg QAM and 200mg QPM  3    cloBAZam (ONFI) 10 MG TABS tablet TAKE ONE TABLET BY MOUTH EVERY 12 HOURS  5    vitamin D (CHOLECALCIFEROL) 5000 units CAPS capsule Take 1 capsule by mouth daily 30 capsule 1    acetaminophen (TYLENOL) 325 MG tablet Take 2 tablets by mouth every 6 hours as needed for Pain or Fever 120 tablet 3    risperiDONE (RISPERDAL) 3 MG tablet Take 3 mg by mouth 2 times daily      topiramate (TOPAMAX) 200 MG tablet Take 200 mg by mouth 2 times daily       No current facility-administered medications on file prior to visit. Review of Systems   Constitutional: Negative for chills, fatigue and fever. Musculoskeletal: Negative for arthralgias and myalgias. Objective:   Pulse 68   Temp 98.4 °F (36.9 °C) (Temporal)   Ht 5' 5\" (1.651 m)   Wt 145 lb (65.8 kg)   LMP 12/10/2000 Comment: menop  SpO2 98%   BMI 24.13 kg/m²     Physical exam:  Examination today of her right shoulder-there is no swelling ecchymoses or skin changes about the right shoulder. She actually has very little tenderness to palpation of the proximal humerus. She is nontender of the [facility] joint clavicle and the periscapular area. She has no pain to palpation of her right elbow forearm wrist hand and fingers. Radial pulses 2+. She is able to flex extend and abduct her fingers. She has intact axillary nerve sensation over the lateral aspect of the shoulder. She has mild to moderate pain with abduction to 60 degrees forward flexion to 60 degrees. Radiographs and Laboratory Studies:     Diagnostic Imaging Studies: X-rays of the right shoulder were reviewed from the emergency room. Narrative   EXAMINATION: XR SHOULDER RIGHT (3 VIEWS)       CLINICAL HISTORY: RIGHT ARM PAIN. FALL.       COMPARISONS: None available.       FINDINGS: Complete, comminuted fracture right humeral head and neck with overlap of fracture fragments.           Impression   RIGHT HUMERAL HEAD AND NECK FRACTURE.       Is evidence for callus formation over the lateral aspect of the fracture. There does appear to be somewhat more displacement between the metaphyseal portion of the fracture and the humeral head. However there is approximately 40 to 50% cortical overlap. Y view shows excellent alignment.         Laboratory Studies:   Lab Results   Component Value Date    WBC 4.6 (L) 07/06/2022    HGB 10.9 (L) 07/06/2022    HCT 34.6 (L) 07/06/2022    MCV 88.9 07/06/2022     07/06/2022     No results found for: SEDRATE  No results found for: CRP    Assessment/Plan:       Diagnosis Orders   1. Closed displaced fracture of surgical neck of right humerus, initial encounter  XR SHOULDER RIGHT (MIN 2 VIEWS)       Impression this time is the patient is a 58-year-old female with MRDD with a refracture through a healing surgical neck fracture of the right humerus. There is somewhat more displacement. However given the patient's age, mental status and functional status nonsurgical treatment should be considered for this fracture. I did review the x-rays visually with Dr. Domitila Garland, our fracture specialist, and he felt that nonoperative treatment would be appropriate for this. Therefore I explained this to the patient and her caregiver. She will be in the sling for 4 weeks. She can have it offered daily axillary care. She is nonweightbearing on the right upper extremity. She should not participate in physical therapy for the right shoulder. She can use a wheelchair for walking anything other than shorter distances due to balance issues. I will see her back in 4 weeks for repeat x-rays of her right shoulder AP and Y views    No orders of the defined types were placed in this encounter. No orders of the defined types were placed in this encounter. No follow-ups on file.       Ora Eckert MD 35

## 2022-07-07 LAB
IRON SATURATION: 15 % (ref 20–55)
IRON: 50 UG/DL (ref 37–145)
TOTAL IRON BINDING CAPACITY: 339 UG/DL (ref 250–450)
UNSATURATED IRON BINDING CAPACITY: 289 UG/DL (ref 112–347)
VITAMIN B-12: 454 PG/ML (ref 232–1245)
VITAMIN D 25-HYDROXY: 41.4 NG/ML

## 2022-07-07 NOTE — PROGRESS NOTES
[address]  PHYSICAL THERAPY PLAN OF CARE   [address] [place], [address] [facility]         Ph: [phone]  Fax: [phone]    [] Certification  [] Recertification []  Plan of Care  [] Progress Note [x] Discharge      Referring Provider: Tristin Reynolds MD     From:  Leticia Martinez, PT   Patient: Go Mckoy (37 y.o. female) : 1965 Date: 2022  Medical Diagnosis: Unspecified displaced fracture of surgical neck of unspecified humerus, initial encounter for closed fracture [S42.213A]    Treatment Diagnosis: decreased R shoulder ROM, UE strength, postural awareness, R shoulder pain, and decreased functional activity tolerance    Plan of Care/Certification Expiration Date: 22   Progress Report Period from:  2022  to 2022    Visits to Date: 4 No Show: 0 Cancelled Appts: 0    OBJECTIVE:   Short Term Goals - Time Frame for Short term goals: 2-3 weeks    Goals Current/Discharge status  Status   Short term goal 1: The pt will maintain R shoulder PROM flex/abd 90 and ER 30 within surgical MD restrictions to increase ease with AROM as applicable    PROM RUE (degrees)  R Shoulder Flex  0-180: 150  R Shoulder ABduction 0-180: 140  R Shoulder Int Rotation  0-70: 50 @ 90* abd  R Shoulder Ext Rotation  0-90: 60 @90* abd Met   Short term goal 2: The pt will demo improved postural awarenes with ability to hold head upright 25% of the time to increase UE function  Contd VC's required  Not Met    Short term goal 3: The pt and caregiver will be indep/compliant with HEP for active wrist and elbow AROM as well as pendulums  Compliant with provided HEP  Met     Long Term Goals - Time Frame for Long term goals : 8-10 weeks  Goals Current/ Discharge status Status   Long term goal 1: The pt will have a increase in UEFI score >/=10 points in order to increase functional activity tolerance NT secondary to unexpected D/C  Not Met    Long term goal 2: The pt will demonstrate improved R UE strength >/=3/5 in order to return to work duties at [employer] (as able per MD restrictions) NT secondary to unexpected D/C  Not Met    Long term goal 3: The pt will demo improved R shoulder AROM flex/abd >/=130*, ER/IR >/=60* in order to increase indep with ADL's  at [facility] (as able per MD restrictions) NT secondary to unexpected D/C  Not Met    Long term goal 4: The pt/caregiver will be indep/compliant with HEP in order to self-manage symptoms upon D/C Pt was compliant with provided/compliant HEP Met     Body Structures, Functions, Activity Limitations Requiring Skilled Therapeutic Intervention: Increased pain,Decreased posture,Decreased ADL status,Decreased ROM,Decreased strength,Decreased fine motor control,Decreased coordination  Assessment: Pt was progressing well towards goals though per LPN at group home, pt had a seizure, fell, and re-broke her arm the other day. Saw ortho who would like her to cx all further PT at this time. D/C PT with pt to f/u with MD for further assessment and course of treatment. Therapy Prognosis: Good    PLAN: D/C                  Patient Status:[] Continue/ Initiate plan of Care    [x] Discharge PT. Recommend pt continue with HEP. [] Additional visits requested, Please re-certify for additional visits:    [] Hold         Signature: Electronically signed by Aurora Hoover PT on 7/7/22 at 1:56 PM EDT    If you have any questions or concerns, please don't hesitate to call. Thank you for your referral.    I have reviewed this plan of care and certify a need for medically necessary rehabilitation services.     Physician Signature:__________________________________________________________  Date:  Please sign and return

## 2022-07-29 LAB
HCT VFR BLD CALC: 36.1 % (ref 37–47)
RETICULOCYTE ABSOLUTE COUNT: 0.04 M/CUMM (ref 0.02–0.11)
RETICULOCYTE COUNT PCT: 1.1 % (ref 0.6–2.2)

## 2022-07-30 LAB
FERRITIN: 38 NG/ML (ref 13–150)
IRON SATURATION: 20 % (ref 20–55)
IRON: 61 UG/DL (ref 37–145)
TOTAL IRON BINDING CAPACITY: 305 UG/DL (ref 250–450)
UNSATURATED IRON BINDING CAPACITY: 244 UG/DL (ref 112–347)

## 2022-08-03 ENCOUNTER — HOSPITAL ENCOUNTER (OUTPATIENT)
Dept: ORTHOPEDIC SURGERY | Age: 57
Discharge: HOME | End: 2022-08-05

## 2022-08-03 DIAGNOSIS — S42.211A CLOSED DISPLACED FRACTURE OF SURGICAL NECK OF RIGHT HUMERUS, INITIAL ENCOUNTER: ICD-10-CM

## 2022-08-17 ENCOUNTER — TELEPHONE (OUTPATIENT)
Dept: GASTROENTEROLOGY | Age: 57
End: 2022-08-17

## 2022-09-28 LAB
BACTERIA: NEGATIVE /HPF
BILIRUBIN URINE: NEGATIVE
BLOOD, URINE: ABNORMAL
CLARITY: CLEAR
COLOR: YELLOW
EPITHELIAL CELLS, UA: ABNORMAL /HPF (ref 0–5)
GLUCOSE URINE: NEGATIVE MG/DL
HYALINE CASTS: ABNORMAL /HPF (ref 0–5)
KETONES, URINE: NEGATIVE MG/DL
LEUKOCYTE ESTERASE, URINE: NEGATIVE
NITRITE, URINE: NEGATIVE
PH UA: 7.5 (ref 5–9)
PROTEIN UA: NEGATIVE MG/DL
RBC UA: ABNORMAL /HPF (ref 0–5)
SPECIFIC GRAVITY UA: 1.01 (ref 1–1.03)
UROBILINOGEN, URINE: 0.2 E.U./DL
WBC UA: ABNORMAL /HPF (ref 0–5)

## 2022-09-30 LAB — URINE CULTURE, ROUTINE: NORMAL

## 2022-10-16 ENCOUNTER — HOSPITAL ENCOUNTER (EMERGENCY)
Age: 57
Discharge: HOME | End: 2022-10-16
Payer: MEDICARE

## 2022-10-16 ENCOUNTER — APPOINTMENT (OUTPATIENT)
Dept: GENERAL RADIOLOGY | Age: 57
End: 2022-10-16
Payer: MEDICARE

## 2022-10-16 VITALS
RESPIRATION RATE: 16 BRPM | SYSTOLIC BLOOD PRESSURE: 109 MMHG | DIASTOLIC BLOOD PRESSURE: 58 MMHG | HEART RATE: 66 BPM | TEMPERATURE: 97.8 F | OXYGEN SATURATION: 98 %

## 2022-10-16 DIAGNOSIS — S93.402A MODERATE LEFT ANKLE SPRAIN, INITIAL ENCOUNTER: Primary | ICD-10-CM

## 2022-10-16 PROCEDURE — 73610 X-RAY EXAM OF ANKLE: CPT

## 2022-10-16 PROCEDURE — 99283 EMERGENCY DEPT VISIT LOW MDM: CPT

## 2022-10-16 PROCEDURE — 6370000000 HC RX 637 (ALT 250 FOR IP): Performed by: PHYSICIAN ASSISTANT

## 2022-10-16 RX ORDER — ACETAMINOPHEN 325 MG/1
650 TABLET ORAL EVERY 6 HOURS PRN
Qty: 120 TABLET | Refills: 3 | Status: SHIPPED | OUTPATIENT
Start: 2022-10-16

## 2022-10-16 RX ORDER — ACETAMINOPHEN 500 MG
1000 TABLET ORAL ONCE
Status: COMPLETED | OUTPATIENT
Start: 2022-10-16 | End: 2022-10-16

## 2022-10-16 RX ADMIN — ACETAMINOPHEN 1000 MG: 500 TABLET ORAL at 13:29

## 2022-10-16 ASSESSMENT — PAIN DESCRIPTION - LOCATION: LOCATION: ANKLE

## 2022-10-16 ASSESSMENT — PAIN SCALES - GENERAL
PAINLEVEL_OUTOF10: 8
PAINLEVEL_OUTOF10: 8

## 2022-10-16 ASSESSMENT — PAIN - FUNCTIONAL ASSESSMENT: PAIN_FUNCTIONAL_ASSESSMENT: 0-10

## 2022-10-16 ASSESSMENT — ENCOUNTER SYMPTOMS: COLOR CHANGE: 0

## 2022-10-16 ASSESSMENT — PAIN DESCRIPTION - ORIENTATION: ORIENTATION: LEFT

## 2022-10-16 NOTE — ED TRIAGE NOTES
To ED from group home via EMS for ankle ain after falling two days ago. Pt denies any other injury. Left ankle tender, swollen on lateral side. MSP intact.

## 2022-10-16 NOTE — ED PROVIDER NOTES
[address] [facility] ED  [facility]      Pt Name: Jordan Garcia  MRN: [id number]  [place] 1965  Date ofevaluation: 10/16/2022  Provider: Patricia Tom PA-C    CHIEF COMPLAINT       Chief Complaint   Patient presents with    Ankle Pain     left         HISTORY OF PRESENT ILLNESS   (Location/Symptom, Timing/Onset,Context/Setting, Quality, Duration, Modifying Factors, Severity)  Note limiting factors. This is a 61 y.o. female with PMHx of multiple chronic medical conditions, importantly seizure disorders presenting to the ED for concerns of left ankle pain x 2 days. Patient reportedly had a seizure on Friday (has multiple seizures), coming from nursing home, and in the process she rolled her left ankle. She denies any pain and has no overt signs of any trauma elsewhere. She has not taken anything today for the pain. Patient rates her pain as an 8/10, constant, throbbing and aching, aggravated with movement, palpation, and weightbearing, no reported alleviating factors, not associate with distal paresthesias, contralateral lower extremity pain, previous injuries or surgeries to the affected foot/ankle. NursingNotes were reviewed. REVIEW OF SYSTEMS    (2-9 systems for level 4, 10 or more for level 5)     Review of Systems   Constitutional:  Negative for chills and fever. Musculoskeletal:  Positive for arthralgias and joint swelling. Skin:  Negative for color change. Neurological:  Positive for seizures. Negative for weakness and numbness. All other systems reviewed and are negative. Except as noted above the remainder of the review of systems was reviewed and negative.        PAST MEDICAL HISTORY     Past Medical History:   Diagnosis Date    Acquired hypothyroidism     Arthritis     Bacteremia due to Gram-negative bacteria 12/11/2016    Mental retardation, mild (I.Q. 50-70)     Other disorders of kidney and ureter in diseases classified elsewhere     Seizures ([facility]          SURGICALHISTORY       Past Surgical History:   Procedure Laterality Date    FRACTURE SURGERY      ORTHOPEDIC SURGERY Bilateral     ankle    TUBAL LIGATION           CURRENT MEDICATIONS       Discharge Medication List as of 10/16/2022  2:04 PM        CONTINUE these medications which have NOT CHANGED    Details   ketorolac (TORADOL) 10 MG tablet Take 1 tablet by mouth every 6 hours as needed for Pain, Disp-20 tablet, R-0Normal      levothyroxine (SYNTHROID) 75 MCG tablet TAKE 1 TABLET BY MOUTH DAILY, Disp-30 tablet, R-1Normal      BRIVIACT 50 MG TABS 100 mg. Take 100mg QAM and 200mg QPM, R-3, DAWHistorical Med      cloBAZam (ONFI) 10 MG TABS tablet TAKE ONE TABLET BY MOUTH EVERY 12 HOURS, R-5Historical Med      vitamin D (CHOLECALCIFEROL) 5000 units CAPS capsule Take 1 capsule by mouth daily, Disp-30 capsule, R-1Print      risperiDONE (RISPERDAL) 3 MG tablet Take 3 mg by mouth 2 times daily      topiramate (TOPAMAX) 200 MG tablet Take 200 mg by mouth 2 times daily                  Patient has no known allergies.     FAMILY HISTORY       Family History   Problem Relation Age of Onset    Stroke Mother     Thyroid Disease Mother     Diabetes Sister     Diabetes Brother     Hypertension Father     Stroke Father         ICH          SOCIAL HISTORY       Social History     Socioeconomic History    Marital status: Single     Spouse name: None    Number of children: None    Years of education: None    Highest education level: None   Tobacco Use    Smoking status: Never    Smokeless tobacco: Never   Vaping Use    Vaping Use: Never used   Substance and Sexual Activity    Alcohol use: No    Drug use: No    Sexual activity: Never       SCREENINGS    [place] Coma Scale  Eye Opening: Spontaneous  Best Verbal Response: Oriented  Best Motor Response: Obeys commands  [place]bi Coma Scale Score: 15        PHYSICAL EXAM    (up to 7 for level 4, 8 or more for level 5)     ED Triage Vitals [10/16/22 1155]   BP Temp Temp Source Heart Rate Resp SpO2 Height Weight   (!) 109/58 97.8 °F (36.6 °C) Oral 66 16 98 % -- --       Physical Exam  Vitals and nursing note reviewed. Constitutional:       General: She is not in acute distress. Appearance: Normal appearance. She is normal weight. She is not ill-appearing, toxic-appearing or diaphoretic. HENT:      Head: Normocephalic and atraumatic. No raccoon eyes, Navarro's sign, abrasion, contusion, masses or laceration. Nose: Nose normal. No congestion or rhinorrhea. Mouth/Throat:      Mouth: Mucous membranes are moist.      Pharynx: Oropharynx is clear. Eyes:      Extraocular Movements: Extraocular movements intact. Conjunctiva/sclera: Conjunctivae normal.   Pulmonary:      Effort: Pulmonary effort is normal. No respiratory distress. Musculoskeletal:         General: Tenderness present. No swelling or signs of injury. Normal range of motion. Cervical back: Normal range of motion and neck supple. Left ankle: Ecchymosis present. No swelling. Tenderness present over the lateral malleolus, medial malleolus and AITF ligament. Normal range of motion. Anterior drawer test negative. Normal pulse. Left Achilles Tendon: No tenderness or defects. Left foot: Normal range of motion and normal capillary refill. No swelling, tenderness or bony tenderness. Normal pulse. Feet:    Skin:     General: Skin is warm and dry. Capillary Refill: Capillary refill takes less than 2 seconds. Neurological:      General: No focal deficit present. Mental Status: She is alert. Mental status is at baseline. Psychiatric:         Mood and Affect: Mood normal.         Behavior: Behavior normal.       RESULTS     RADIOLOGY:   Non-plain filmimages such as CT, Ultrasound and MRI are read by the radiologist.        Interpretation per the Radiologist below, if available at the time ofthis note:    XR ANKLE LEFT (MIN 3 VIEWS)   Final Result   No acute osseous abnormality. LABS:  Labs Reviewed - No data to display    All other labs were within normal range or not returned as of this dictation. EMERGENCY DEPARTMENT COURSE and DIFFERENTIAL DIAGNOSIS/MDM:   Vitals:    Vitals:    10/16/22 1155   BP: (!) 109/58   Pulse: 66   Resp: 16   Temp: 97.8 °F (36.6 °C)   TempSrc: Oral   SpO2: 98%            MDM  Number of Diagnoses or Management Options  Moderate left ankle sprain, initial encounter  Diagnosis management comments: 50-year-old female with history of seizure disorder presents for evaluation of left ankle pain after a seizure that occurred on Friday. She has a history of seizure disorders and has frequent seizures. She denies any pain or injury elsewhere. She has not taken anything today for the pain in her ankle. On exam there is some swelling along the lateral and medial malleolus of the left ankle. No increased joint laxity. X-ray obtained and negative. She was given supportive medications. Aircast as needed, podiatry follow-up with ED return precautions. REASSESSMENT              PROCEDURES:  Unless otherwise noted below, none     Procedures    FINAL IMPRESSION      1.  Moderate left ankle sprain, initial encounter          DISPOSITION/PLAN   DISPOSITION Decision To Discharge 10/16/2022 02:17:42 PM      PATIENT REFERREDTO:  Ben Finch DPM  [address] [place] [address]  [phone]          DISCHARGEMEDICATIONS:  Discharge Medication List as of 10/16/2022  2:04 PM             (Please note that portions of this note were completed with a voice recognition program.  Efforts were made to edit the dictations but occasionally words are mis-transcribed.)    Amy Aguirre PA-C (electronically signed)  Attending Emergency Physician        Amy Aguirre PA-C  10/16/22 [id number]

## 2022-12-06 ENCOUNTER — HOSPITAL ENCOUNTER (OUTPATIENT)
Age: 57
Setting detail: OUTPATIENT SURGERY
Discharge: NURSING, REHAB OR LONG-TERM CARE | End: 2022-12-06
Attending: INTERNAL MEDICINE | Admitting: INTERNAL MEDICINE
Payer: MEDICARE

## 2022-12-06 ENCOUNTER — ANESTHESIA (OUTPATIENT)
Dept: ENDOSCOPY | Age: 57
End: 2022-12-06
Payer: MEDICARE

## 2022-12-06 ENCOUNTER — ANESTHESIA EVENT (OUTPATIENT)
Dept: ENDOSCOPY | Age: 57
End: 2022-12-06
Payer: MEDICARE

## 2022-12-06 VITALS
TEMPERATURE: 97.9 F | DIASTOLIC BLOOD PRESSURE: 54 MMHG | HEIGHT: 62 IN | RESPIRATION RATE: 18 BRPM | OXYGEN SATURATION: 95 % | HEART RATE: 70 BPM | BODY MASS INDEX: 26.52 KG/M2 | SYSTOLIC BLOOD PRESSURE: 100 MMHG

## 2022-12-06 PROBLEM — Z12.11 SCREENING FOR COLON CANCER: Status: ACTIVE | Noted: 2022-12-06

## 2022-12-06 PROCEDURE — 2500000003 HC RX 250 WO HCPCS: Performed by: NURSE ANESTHETIST, CERTIFIED REGISTERED

## 2022-12-06 PROCEDURE — 7100000011 HC PHASE II RECOVERY - ADDTL 15 MIN: Performed by: INTERNAL MEDICINE

## 2022-12-06 PROCEDURE — 2709999900 HC NON-CHARGEABLE SUPPLY: Performed by: INTERNAL MEDICINE

## 2022-12-06 PROCEDURE — 2580000003 HC RX 258

## 2022-12-06 PROCEDURE — 3700000000 HC ANESTHESIA ATTENDED CARE: Performed by: INTERNAL MEDICINE

## 2022-12-06 PROCEDURE — 6370000000 HC RX 637 (ALT 250 FOR IP): Performed by: INTERNAL MEDICINE

## 2022-12-06 PROCEDURE — 6360000002 HC RX W HCPCS: Performed by: NURSE ANESTHETIST, CERTIFIED REGISTERED

## 2022-12-06 PROCEDURE — 3700000001 HC ADD 15 MINUTES (ANESTHESIA): Performed by: INTERNAL MEDICINE

## 2022-12-06 PROCEDURE — G0121 COLON CA SCRN NOT HI RSK IND: HCPCS | Performed by: INTERNAL MEDICINE

## 2022-12-06 PROCEDURE — 3609027000 HC COLONOSCOPY: Performed by: INTERNAL MEDICINE

## 2022-12-06 PROCEDURE — 7100000010 HC PHASE II RECOVERY - FIRST 15 MIN: Performed by: INTERNAL MEDICINE

## 2022-12-06 PROCEDURE — 2580000003 HC RX 258: Performed by: INTERNAL MEDICINE

## 2022-12-06 RX ORDER — LIDOCAINE HYDROCHLORIDE 20 MG/ML
INJECTION, SOLUTION INFILTRATION; PERINEURAL PRN
Status: DISCONTINUED | OUTPATIENT
Start: 2022-12-06 | End: 2022-12-06 | Stop reason: SDUPTHER

## 2022-12-06 RX ORDER — SODIUM CHLORIDE 9 MG/ML
INJECTION, SOLUTION INTRAVENOUS CONTINUOUS
Status: DISCONTINUED | OUTPATIENT
Start: 2022-12-06 | End: 2022-12-06 | Stop reason: HOSPADM

## 2022-12-06 RX ORDER — PHENOL 1.4 %
AEROSOL, SPRAY (ML) MUCOUS MEMBRANE
COMMUNITY

## 2022-12-06 RX ORDER — MAGNESIUM HYDROXIDE 1200 MG/15ML
LIQUID ORAL PRN
Status: DISCONTINUED | OUTPATIENT
Start: 2022-12-06 | End: 2022-12-06 | Stop reason: ALTCHOICE

## 2022-12-06 RX ORDER — SIMETHICONE 20 MG/.3ML
EMULSION ORAL
Status: DISCONTINUED
Start: 2022-12-06 | End: 2022-12-06 | Stop reason: HOSPADM

## 2022-12-06 RX ORDER — SIMETHICONE 20 MG/.3ML
EMULSION ORAL PRN
Status: DISCONTINUED | OUTPATIENT
Start: 2022-12-06 | End: 2022-12-06 | Stop reason: ALTCHOICE

## 2022-12-06 RX ORDER — FERROUS SULFATE 325(65) MG
TABLET ORAL
COMMUNITY
Start: 2022-07-13

## 2022-12-06 RX ORDER — SODIUM CHLORIDE 9 MG/ML
INJECTION, SOLUTION INTRAVENOUS
Status: COMPLETED
Start: 2022-12-06 | End: 2022-12-06

## 2022-12-06 RX ORDER — PROPOFOL 10 MG/ML
INJECTION, EMULSION INTRAVENOUS PRN
Status: DISCONTINUED | OUTPATIENT
Start: 2022-12-06 | End: 2022-12-06 | Stop reason: SDUPTHER

## 2022-12-06 RX ORDER — MIDAZOLAM 5 MG/.1ML
SPRAY NASAL
COMMUNITY

## 2022-12-06 RX ORDER — CENOBAMATE 12.5-25MG
KIT ORAL
COMMUNITY
Start: 2022-10-04

## 2022-12-06 RX ORDER — TIZANIDINE 2 MG/1
2 TABLET ORAL EVERY 6 HOURS PRN
COMMUNITY

## 2022-12-06 RX ORDER — TRAZODONE HYDROCHLORIDE 50 MG/1
TABLET ORAL
COMMUNITY
Start: 2022-10-09

## 2022-12-06 RX ORDER — NALOXONE HYDROCHLORIDE 0.4 MG/ML
0.4 INJECTION, SOLUTION INTRAMUSCULAR; INTRAVENOUS; SUBCUTANEOUS PRN
COMMUNITY

## 2022-12-06 RX ORDER — LACOSAMIDE 100 MG/1
TABLET ORAL
COMMUNITY
Start: 2022-10-06

## 2022-12-06 RX ADMIN — LIDOCAINE HYDROCHLORIDE 60 MG: 20 INJECTION, SOLUTION INFILTRATION; PERINEURAL at 10:25

## 2022-12-06 RX ADMIN — SODIUM CHLORIDE: 9 INJECTION, SOLUTION INTRAVENOUS at 10:18

## 2022-12-06 RX ADMIN — PROPOFOL 350 MG: 10 INJECTION, EMULSION INTRAVENOUS at 10:27

## 2022-12-06 ASSESSMENT — PAIN - FUNCTIONAL ASSESSMENT: PAIN_FUNCTIONAL_ASSESSMENT: NONE - DENIES PAIN

## 2022-12-06 NOTE — ANESTHESIA PRE PROCEDURE
Department of Anesthesiology  Preprocedure Note       Name:  Omayra Agiurre   Age:  61 y.o.  :  1965                                          MRN:  [id number]         Date:  2022      Surgeon: Nathalie Talamantes):  Neal Jama MD    Procedure: Procedure(s):  COLORECTAL CANCER SCREENING, NOT HIGH RISK    Medications prior to admission:   Prior to Admission medications    Medication Sig Start Date End Date Taking? Authorizing Provider   ferrous sulfate (IRON 325) 325 (65 Fe) MG tablet Take by mouth 22  Yes Historical Provider, MD   Melatonin 10 MG TABS Take by mouth   Yes Historical Provider, MD   naloxone (NARCAN) 0.4 MG/ML injection Infuse 0.4 mg intravenously as needed   Yes Historical Provider, MD   Midazolam (NAYZILAM) 5 MG/0.1ML SOLN by Nasal route   Yes Historical Provider, MD   tiZANidine (ZANAFLEX) 2 MG tablet Take 2 mg by mouth every 6 hours as needed   Yes Historical Provider, MD   lacosamide (VIMPAT) 100 MG TABS tablet  10/6/22   Historical Provider, MD   traZODone (DESYREL) 50 MG tablet  10/9/22   Historical Provider, MD   XCOPRI 14 x 12.5 MG & 14 x 25 MG TBPK  10/4/22   Historical Provider, MD   acetaminophen (TYLENOL) 325 MG tablet Take 2 tablets by mouth every 6 hours as needed for Pain or Fever 10/16/22   Bernardo Shaver PA-C   ketorolac (TORADOL) 10 MG tablet Take 1 tablet by mouth every 6 hours as needed for Pain  Patient not taking: Reported on 2022   Joe Escalante Si, MD   levothyroxine (SYNTHROID) 75 MCG tablet TAKE 1 TABLET BY MOUTH DAILY 22   Aubree Wolf MD   BRIVIACT 50 MG TABS 100 mg.  Take 100mg QAM and 200mg QPM 19   Historical Provider, MD   cloBAZam (ONFI) 10 MG TABS tablet TAKE ONE TABLET BY MOUTH EVERY 12 HOURS 18   Historical Provider, MD   vitamin D (CHOLECALCIFEROL) 5000 units CAPS capsule Take 1 capsule by mouth daily 1/15/19   Crystal Bhandari DO   risperiDONE (RISPERDAL) 3 MG tablet Take 3 mg by mouth 2 times daily Historical Provider, MD   topiramate (TOPAMAX) 200 MG tablet Take 200 mg by mouth 2 times daily    Historical Provider, MD       Current medications:    Current Facility-Administered Medications   Medication Dose Route Frequency Provider Last Rate Last Admin    sodium chloride 0.9 % infusion             simethicone (MYLICON) 40 /2.5HO drops                Allergies:  No Known Allergies    Problem List:    Patient Active Problem List   Diagnosis Code    Degeneration of lumbar intervertebral disc M51.36    Seizure disorder ([facility] [address] G40.909    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 classified elsewhere     Seizures ([facility] [address]        Past Surgical History:        Procedure Laterality Date    FRACTURE SURGERY      ORTHOPEDIC SURGERY Bilateral     ankle    TUBAL LIGATION         Social History:    Social History     Tobacco Use    Smoking status: Never    Smokeless tobacco: Never   Substance Use Topics    Alcohol use: No                                Counseling given: Not Answered      Vital Signs (Current): There were no vitals filed for this visit.                                            BP Readings from Last 3 Encounters:   10/16/22 (!) 109/58   07/04/22 107/83   06/21/22 116/68       NPO Status:                                                                                 BMI:   Wt Readings from Last 3 Encounters:   07/06/22 145 lb (65.8 kg)   07/04/22 145 lb (65.8 kg)   05/12/22 145 lb (65.8 kg)     There is no height or weight on file to calculate BMI.    CBC:   Lab Results   Component Value Date/Time    WBC 4.6 07/06/2022 05:37 AM    RBC 3.89 07/06/2022 05:37 AM HGB 10.9 07/06/2022 05:37 AM    HCT 36.1 07/29/2022 05:30 AM    MCV 88.9 07/06/2022 05:37 AM    RDW 16.8 07/06/2022 05:37 AM     07/06/2022 05:37 AM       CMP:   Lab Results   Component Value Date/Time     07/06/2022 05:37 AM    K 3.2 07/06/2022 05:37 AM    K 3.7 07/04/2022 07:15 PM     07/06/2022 05:37 AM    CO2 21 07/06/2022 05:37 AM    BUN 13 07/06/2022 05:37 AM    CREATININE 0.67 07/06/2022 05:37 AM    GFRAA >60.0 07/06/2022 05:37 AM    LABGLOM >60.0 07/06/2022 05:37 AM    GLUCOSE 120 07/06/2022 05:37 AM    PROT 7.2 07/06/2022 05:37 AM    CALCIUM 9.1 07/06/2022 05:37 AM    BILITOT <0.2 07/06/2022 05:37 AM    ALKPHOS 186 07/06/2022 05:37 AM    AST 19 07/06/2022 05:37 AM    ALT 21 07/06/2022 05:37 AM       POC Tests: No results for input(s): POCGLU, POCNA, POCK, POCCL, POCBUN, POCHEMO, POCHCT in the last 72 hours.     Coags:   Lab Results   Component Value Date/Time    PROTIME 10.2 01/11/2019 06:45 AM    INR 1.0 01/11/2019 06:45 AM    APTT 25.8 01/11/2019 06:45 AM       HCG (If Applicable):   Lab Results   Component Value Date    PREGTESTUR NEG 08/06/2012        ABGs: No results found for: PHART, PO2ART, KDL2NFF, SMM9IOO, BEART, D1RUUDAY     Type & Screen (If Applicable):  No results found for: LABABO, LABRH    Drug/Infectious Status (If Applicable):  No results found for: HIV, HEPCAB    COVID-19 Screening (If Applicable):   Lab Results   Component Value Date/Time    COVID19 DETECTED 03/29/2021 07:13 AM    COVID19 Not Detected 02/12/2021 05:55 PM           Anesthesia Evaluation  Patient summary reviewed no history of anesthetic complications:   Airway: Mallampati: II  TM distance: >3 FB   Neck ROM: full  Mouth opening: > = 3 FB   Dental:          Pulmonary:Negative Pulmonary ROS and normal exam                               Cardiovascular:Negative CV ROS  Exercise tolerance: no interval change,            Beta Blocker:  Not on Beta Blocker         Neuro/Psych:   (+) seizures: no interval change,             GI/Hepatic/Renal:   (+) bowel prep,           Endo/Other:    (+) hypothyroidism::., .          Pt had no PAT visit       Abdominal:             Vascular: negative vascular ROS. Other Findings:           Anesthesia Plan      MAC     ASA 2       Induction: intravenous. Anesthetic plan and risks discussed with patient. Use of blood products discussed with patient whom. Plan discussed with surgical team and attending.                     BARBIE Mchugh CRNA   12/6/2022

## 2022-12-06 NOTE — H&P
Patient Name: Rebecca Green  : 1965  MRN: [id number]  DATE: 22      ENDOSCOPY  History and Physical    Procedure:    [] Diagnostic Colonoscopy       [x] Screening Colonoscopy  [] EGD      [] ERCP      [] EUS       [] Other    [x] Previous office notes/History and Physical reviewed from the patients chart. Please see EMR for further details of HPI. I have examined the patient's status immediately prior to the procedure and:      Indications/HPI:    []Abdominal Pain   []Cancer- GI/Lung  []Fhx of colon CA/polyps  []History of Polyps   []Barbas   []Melena  []Abnormal Imaging   []Dysphagia    []Persistent Pneumonia  []Anemia   []Food Impaction  []History of Polyps  []GI Bleed   []Pulmonary nodule/Mass  []Change in bowel habits  []Heartburn/Reflux  []Rectal Bleed (BRBPR)  []Chest Pain - Non Cardiac  []Heme (+) Stool  []Ulcers  []Constipation   []Hemoptysis   []Varices  []Diarrhea   []Hypoxemia  []Nausea/Vomiting   [x]Screening   []Crohns/Colitis  []Other:    Anesthesia:   [x] MAC [] Moderate Sedation   [] General   [] None     ROS: 12 pt Review of Symptoms was negative unless mentioned above    Medications:   Prior to Admission medications    Medication Sig Start Date End Date Taking?  Authorizing Provider   ferrous sulfate (IRON 325) 325 (65 Fe) MG tablet Take by mouth 22  Yes Historical Provider, MD   Melatonin 10 MG TABS Take by mouth   Yes Historical Provider, MD   naloxone (NARCAN) 0.4 MG/ML injection Infuse 0.4 mg intravenously as needed   Yes Historical Provider, MD   Midazolam (NAYZILAM) 5 MG/0.1ML SOLN by Nasal route   Yes Historical Provider, MD   tiZANidine (ZANAFLEX) 2 MG tablet Take 2 mg by mouth every 6 hours as needed   Yes Historical Provider, MD   lacosamide (VIMPAT) 100 MG TABS tablet  10/6/22   Historical Provider, MD   traZODone (DESYREL) 50 MG tablet  10/9/22   Historical Provider, MD   XCOPRI 14 x 12.5 MG & 14 x 25 MG TBPK  10/4/22   Historical Provider, MD   acetaminophen []Comments:  Other:   []WNL  []Comments:    Informed Consent:  The risks and benefits of the procedure have been discussed with either the patient or if they cannot consent, their representative. Assessment:  Patient examined and appropriate for planned sedation and procedure. Plan:  Proceed with planned sedation and procedure as above.     Corinna Alfaro MD  10:18 AM

## 2022-12-06 NOTE — ANESTHESIA POSTPROCEDURE EVALUATION
Department of Anesthesiology  Postprocedure Note    Patient: Shadi Farrell  MRN: [id number]  YOB: 1965  Date of evaluation: 12/6/2022      Procedure Summary     Date: 12/06/22 Room / Location: [address] [place] [address]    Anesthesia Start: 1024 Anesthesia Stop: 1056    Procedure: COLONOSCOPY Diagnosis:       Colon cancer screening      (Colon cancer screening [Z12.11])    Surgeons: Ernesto Hopkins MD Responsible Provider: BARBIE Robbins CRNA    Anesthesia Type: MAC ASA Status: 2          Anesthesia Type: No value filed.     Zo Phase I:      Zo Phase II:        Anesthesia Post Evaluation    Patient location during evaluation: bedside  Patient participation: complete - patient participated  Level of consciousness: awake and awake and alert  Airway patency: patent  Nausea & Vomiting: no nausea and no vomiting  Complications: no  Cardiovascular status: blood pressure returned to baseline and hemodynamically stable  Respiratory status: acceptable  Hydration status: euvolemic

## 2023-01-05 ENCOUNTER — HOSPITAL ENCOUNTER (OUTPATIENT)
Dept: WOMENS IMAGING | Age: 58
Discharge: HOME | End: 2023-01-07
Payer: MEDICARE

## 2023-01-05 DIAGNOSIS — Z12.31 OTHER SCREENING MAMMOGRAM: ICD-10-CM

## 2023-01-05 PROBLEM — Z12.11 SCREENING FOR COLON CANCER: Status: RESOLVED | Noted: 2022-12-06 | Resolved: 2023-01-05

## 2023-01-05 PROCEDURE — 77067 SCR MAMMO BI INCL CAD: CPT

## 2023-01-13 ENCOUNTER — APPOINTMENT (OUTPATIENT)
Dept: NEUROLOGY | Age: 58
End: 2023-01-13
Payer: MEDICARE

## 2023-01-13 ENCOUNTER — HOSPITAL ENCOUNTER (OUTPATIENT)
Dept: MRI IMAGING | Age: 58
End: 2023-01-13
Payer: MEDICARE

## 2023-01-13 DIAGNOSIS — R41.89: ICD-10-CM

## 2023-01-13 PROCEDURE — 70551 MRI BRAIN STEM W/O DYE: CPT

## 2023-01-19 ENCOUNTER — HOSPITAL ENCOUNTER (EMERGENCY)
Age: 58
Discharge: HOME | End: 2023-01-19
Attending: EMERGENCY MEDICINE
Payer: MEDICARE

## 2023-01-19 ENCOUNTER — APPOINTMENT (OUTPATIENT)
Dept: CT IMAGING | Age: 58
End: 2023-01-19
Payer: MEDICARE

## 2023-01-19 VITALS
HEIGHT: 62 IN | SYSTOLIC BLOOD PRESSURE: 109 MMHG | TEMPERATURE: 98.9 F | RESPIRATION RATE: 17 BRPM | OXYGEN SATURATION: 95 % | WEIGHT: 145 LBS | DIASTOLIC BLOOD PRESSURE: 59 MMHG | BODY MASS INDEX: 26.68 KG/M2 | HEART RATE: 69 BPM

## 2023-01-19 DIAGNOSIS — G40.919 BREAKTHROUGH SEIZURE (HCC): ICD-10-CM

## 2023-01-19 DIAGNOSIS — S09.90XA CLOSED HEAD INJURY, INITIAL ENCOUNTER: Primary | ICD-10-CM

## 2023-01-19 LAB
ALBUMIN SERPL-MCNC: 3.8 G/DL (ref 3.5–4.6)
ALP BLD-CCNC: 136 U/L (ref 40–130)
ALT SERPL-CCNC: 17 U/L (ref 0–33)
ANION GAP SERPL CALCULATED.3IONS-SCNC: 14 MEQ/L (ref 9–15)
AST SERPL-CCNC: 19 U/L (ref 0–35)
BASOPHILS ABSOLUTE: 0.1 K/UL (ref 0–0.2)
BASOPHILS RELATIVE PERCENT: 0.7 %
BILIRUB SERPL-MCNC: <0.2 MG/DL (ref 0.2–0.7)
BUN BLDV-MCNC: 14 MG/DL (ref 6–20)
CALCIUM SERPL-MCNC: 8.5 MG/DL (ref 8.5–9.9)
CHLORIDE BLD-SCNC: 110 MEQ/L (ref 95–107)
CO2: 20 MEQ/L (ref 20–31)
CREAT SERPL-MCNC: 0.73 MG/DL (ref 0.5–0.9)
EOSINOPHILS ABSOLUTE: 0.1 K/UL (ref 0–0.7)
EOSINOPHILS RELATIVE PERCENT: 1 %
GFR SERPL CREATININE-BSD FRML MDRD: >60 ML/MIN/{1.73_M2}
GLOBULIN: 2.9 G/DL (ref 2.3–3.5)
GLUCOSE BLD-MCNC: 98 MG/DL (ref 70–99)
HCT VFR BLD CALC: 37.6 % (ref 37–47)
HEMOGLOBIN: 12.4 G/DL (ref 12–16)
LACTIC ACID: 1.4 MMOL/L (ref 0.5–2.2)
LYMPHOCYTES ABSOLUTE: 0.9 K/UL (ref 1–4.8)
LYMPHOCYTES RELATIVE PERCENT: 12.3 %
MAGNESIUM: 2.2 MG/DL (ref 1.7–2.4)
MCH RBC QN AUTO: 32.2 PG (ref 27–31.3)
MCHC RBC AUTO-ENTMCNC: 33 % (ref 33–37)
MCV RBC AUTO: 97.4 FL (ref 79.4–94.8)
MONOCYTES ABSOLUTE: 0.5 K/UL (ref 0.2–0.8)
MONOCYTES RELATIVE PERCENT: 6.7 %
NEUTROPHILS ABSOLUTE: 5.8 K/UL (ref 1.4–6.5)
NEUTROPHILS RELATIVE PERCENT: 79.3 %
PDW BLD-RTO: 13.8 % (ref 11.5–14.5)
PLATELET # BLD: 280 K/UL (ref 130–400)
POTASSIUM SERPL-SCNC: 3.5 MEQ/L (ref 3.4–4.9)
RBC # BLD: 3.86 M/UL (ref 4.2–5.4)
SODIUM BLD-SCNC: 144 MEQ/L (ref 135–144)
TOTAL PROTEIN: 6.7 G/DL (ref 6.3–8)
WBC # BLD: 7.3 K/UL (ref 4.8–10.8)

## 2023-01-19 PROCEDURE — 83735 ASSAY OF MAGNESIUM: CPT

## 2023-01-19 PROCEDURE — 36415 COLL VENOUS BLD VENIPUNCTURE: CPT

## 2023-01-19 PROCEDURE — 83605 ASSAY OF LACTIC ACID: CPT

## 2023-01-19 PROCEDURE — 80053 COMPREHEN METABOLIC PANEL: CPT

## 2023-01-19 PROCEDURE — 80201 ASSAY OF TOPIRAMATE: CPT

## 2023-01-19 PROCEDURE — 70450 CT HEAD/BRAIN W/O DYE: CPT

## 2023-01-19 PROCEDURE — 80299 QUANTITATIVE ASSAY DRUG: CPT

## 2023-01-19 PROCEDURE — 6370000000 HC RX 637 (ALT 250 FOR IP): Performed by: EMERGENCY MEDICINE

## 2023-01-19 PROCEDURE — 99284 EMERGENCY DEPT VISIT MOD MDM: CPT

## 2023-01-19 PROCEDURE — 85025 COMPLETE CBC W/AUTO DIFF WBC: CPT

## 2023-01-19 RX ORDER — ACETAMINOPHEN 500 MG
1000 TABLET ORAL ONCE
Status: COMPLETED | OUTPATIENT
Start: 2023-01-19 | End: 2023-01-19

## 2023-01-19 RX ADMIN — ACETAMINOPHEN 1000 MG: 500 TABLET ORAL at 09:16

## 2023-01-19 ASSESSMENT — PAIN - FUNCTIONAL ASSESSMENT: PAIN_FUNCTIONAL_ASSESSMENT: 0-10

## 2023-01-19 ASSESSMENT — PAIN DESCRIPTION - PAIN TYPE: TYPE: ACUTE PAIN

## 2023-01-19 ASSESSMENT — PAIN DESCRIPTION - ORIENTATION: ORIENTATION: LEFT

## 2023-01-19 ASSESSMENT — PAIN DESCRIPTION - LOCATION: LOCATION: HEAD

## 2023-01-19 ASSESSMENT — ENCOUNTER SYMPTOMS
CHEST TIGHTNESS: 0
SORE THROAT: 0
SHORTNESS OF BREATH: 0
ABDOMINAL PAIN: 0
NAUSEA: 0
EYE PAIN: 0
VOMITING: 0

## 2023-01-19 ASSESSMENT — PAIN SCALES - GENERAL
PAINLEVEL_OUTOF10: 6
PAINLEVEL_OUTOF10: 9

## 2023-01-19 ASSESSMENT — LIFESTYLE VARIABLES
HOW MANY STANDARD DRINKS CONTAINING ALCOHOL DO YOU HAVE ON A TYPICAL DAY: PATIENT DOES NOT DRINK
HOW OFTEN DO YOU HAVE A DRINK CONTAINING ALCOHOL: NEVER

## 2023-01-19 ASSESSMENT — PAIN DESCRIPTION - DESCRIPTORS: DESCRIPTORS: DISCOMFORT

## 2023-01-19 ASSESSMENT — PAIN DESCRIPTION - FREQUENCY: FREQUENCY: CONTINUOUS

## 2023-01-19 NOTE — ED NOTES
Sister is calling group home to see if they come and  pt or if she needs to bring pt back      Sarai Hough RN  01/19/23 0135

## 2023-01-19 NOTE — ED NOTES
Report given to Naa Wilson at this time      [facility]  01/19/23 5592

## 2023-01-19 NOTE — ED PROVIDER NOTES
[facility] ED  EMERGENCY DEPARTMENT ENCOUNTER      Pt Name: Matt Hardin  MRN: [id number]  [place] 1965  Date of evaluation: 1/19/2023  Provider: Keyana Delgado DO    CHIEF COMPLAINT       Chief Complaint   Patient presents with    Fall     Pt had a unwitnessed fall In her room, pt has a hematoma to the left side of the forehead. Pt has noted swelling to that area as well. Pt is Is a/ox3 resp even and unlabored. Pt has nose dry bloosed noted around her nose. HISTORY OF PRESENT ILLNESS   (Location/Symptom, Timing/Onset, Context/Setting, Quality, Duration, Modifying Factors, Severity)  Note limiting factors. Matt Hardin is a 61 y.o. female who presents to the emergency department . Patient sent in after head injury. Patient states that she had a seizure and hit her head. History of mental retardation seizure disorders. Patient is on Topamax and Vimpat and tells me she has been taking the medication. Denies any neck pain chest pain abdominal pain back pain or focal weakness numbness or paresthesias. She is not vomiting. She is not on blood thinners    HPI    Nursing Notes were reviewed. REVIEW OF SYSTEMS    (2-9 systems for level 4, 10 or more for level 5)     Review of Systems   Constitutional:  Negative for activity change, appetite change and fatigue. HENT:  Negative for congestion and sore throat. Eyes:  Negative for pain and visual disturbance. Respiratory:  Negative for chest tightness and shortness of breath. Cardiovascular:  Negative for chest pain. Gastrointestinal:  Negative for abdominal pain, nausea and vomiting. Endocrine: Negative for polydipsia. Genitourinary:  Negative for flank pain and urgency. Musculoskeletal:  Negative for gait problem and neck stiffness. Skin:  Negative for rash. Neurological:  Positive for seizures and headaches. Negative for weakness and light-headedness.    Psychiatric/Behavioral:  Negative for confusion and sleep disturbance. Except as noted above the remainder of the review of systems was reviewed and negative. PAST MEDICAL HISTORY     Past Medical History:   Diagnosis Date    Acquired hypothyroidism     Arthritis     Bacteremia due to Gram-negative bacteria 12/11/2016    Mental retardation, mild (I.Q. 50-70)     Other disorders of kidney and ureter in diseases classified elsewhere     Seizures ([facility] [address]          SURGICAL HISTORY       Past Surgical History:   Procedure Laterality Date    COLONOSCOPY N/A 12/6/2022    COLONOSCOPY performed by Milagros Moya MD at [address] [place] [address] Bilateral     ankle    TUBAL LIGATION           CURRENT MEDICATIONS       Previous Medications    ACETAMINOPHEN (TYLENOL) 325 MG TABLET    Take 2 tablets by mouth every 6 hours as needed for Pain or Fever    BRIVIACT 50 MG TABS    100 mg. Take 100mg QAM and 200mg QPM    CLOBAZAM (ONFI) 10 MG TABS TABLET    TAKE ONE TABLET BY MOUTH EVERY 12 HOURS    FERROUS SULFATE (IRON 325) 325 (65 FE) MG TABLET    Take by mouth    KETOROLAC (TORADOL) 10 MG TABLET    Take 1 tablet by mouth every 6 hours as needed for Pain    LACOSAMIDE (VIMPAT) 100 MG TABS TABLET        LEVOTHYROXINE (SYNTHROID) 75 MCG TABLET    TAKE 1 TABLET BY MOUTH DAILY    MELATONIN 10 MG TABS    Take by mouth    MIDAZOLAM (NAYZILAM) 5 MG/0.1ML SOLN    by Nasal route    NALOXONE (NARCAN) 0.4 MG/ML INJECTION    Infuse 0.4 mg intravenously as needed    RISPERIDONE (RISPERDAL) 3 MG TABLET    Take 3 mg by mouth 2 times daily    TIZANIDINE (ZANAFLEX) 2 MG TABLET    Take 2 mg by mouth every 6 hours as needed    TOPIRAMATE (TOPAMAX) 200 MG TABLET    Take 200 mg by mouth 2 times daily    TRAZODONE (DESYREL) 50 MG TABLET        VITAMIN D (CHOLECALCIFEROL) 5000 UNITS CAPS CAPSULE    Take 1 capsule by mouth daily    XCOPRI 14 X 12.5 MG & 14 X 25 MG TBPK           ALLERGIES     Patient has no known allergies.     FAMILY HISTORY       Family History   Problem Relation Age of Onset    Stroke Mother     Thyroid Disease Mother     Diabetes Sister     Diabetes Brother     Hypertension Father     Stroke Father         ICH          SOCIAL HISTORY       Social History     Socioeconomic History    Marital status: Single   Tobacco Use    Smoking status: Never    Smokeless tobacco: Never   Vaping Use    Vaping Use: Never used   Substance and Sexual Activity    Alcohol use: No    Drug use: No    Sexual activity: Never       SCREENINGS        [place] Coma Scale  Eye Opening: Spontaneous  Best Verbal Response: Confused  Best Motor Response: Obeys commands  Obi Coma Scale Score: 14               PHYSICAL EXAM    (up to 7 for level 4, 8 or more for level 5)     ED Triage Vitals [01/19/23 0729]   BP Temp Temp Source Heart Rate Resp SpO2 Height Weight   (!) 119/54 98.9 °F (37.2 °C) Oral 88 18 96 % 5' 2\" (1.575 m) 145 lb (65.8 kg)       Physical Exam  Vitals and nursing note reviewed. Constitutional:       General: She is not in acute distress. Appearance: She is well-developed. She is not diaphoretic. HENT:      Head: Normocephalic. Comments: Left frontal hematoma     Right Ear: External ear normal.      Left Ear: External ear normal.      Nose: Nose normal.      Mouth/Throat:      Mouth: Mucous membranes are moist.      Pharynx: No oropharyngeal exudate. Eyes:      Extraocular Movements: Extraocular movements intact. Conjunctiva/sclera: Conjunctivae normal.      Pupils: Pupils are equal, round, and reactive to light. Neck:      Thyroid: No thyromegaly. Vascular: No JVD. Trachea: No tracheal deviation. Cardiovascular:      Rate and Rhythm: Normal rate. Heart sounds: Normal heart sounds. No murmur heard. Pulmonary:      Effort: Pulmonary effort is normal. No respiratory distress. Breath sounds: Normal breath sounds. No wheezing. Abdominal:      General: Bowel sounds are normal.      Palpations: Abdomen is soft. Tenderness: There is no abdominal tenderness. There is no guarding. Musculoskeletal:         General: Normal range of motion. Cervical back: Normal range of motion and neck supple. Right lower leg: No edema. Left lower leg: No edema. Comments: No neck pain or pain or the midline of the cervical spine thoracic spine or lumbar spine   Skin:     General: Skin is warm and dry. Findings: No rash. Neurological:      Mental Status: She is alert and oriented to person, place, and time. Mental status is at baseline. Cranial Nerves: No cranial nerve deficit. Psychiatric:         Behavior: Behavior normal.       DIAGNOSTIC RESULTS     EKG: All EKG's are interpreted by the Emergency Department Physician who either signs or Co-signs this chart in the absence of a cardiologist.        RADIOLOGY:   Non-plain film images such as CT, Ultrasound and MRI are read by the radiologist. Plain radiographic images are visualized and preliminarily interpreted by the emergency physician with the below findings:        Interpretation per the Radiologist below, if available at the time of this note:    CT Head W/O Contrast   Final Result   1. There is no acute intracranial hemorrhage   2. Left supraorbital soft tissue contusion/hematoma. ED BEDSIDE ULTRASOUND:   Performed by ED Physician - none    LABS:  Labs Reviewed   COMPREHENSIVE METABOLIC PANEL - Abnormal; Notable for the following components:       Result Value    Chloride 110 (*)     Alkaline Phosphatase 136 (*)     All other components within normal limits   CBC WITH AUTO DIFFERENTIAL - Abnormal; Notable for the following components:    RBC 3.86 (*)     MCV 97.4 (*)     MCH 32.2 (*)     Lymphocytes Absolute 0.9 (*)     All other components within normal limits   LACTIC ACID   MAGNESIUM   LACOSAMIDE LEVEL   TOPIRAMATE LEVEL       All other labs were within normal range or not returned as of this dictation.     EMERGENCY DEPARTMENT COURSE and DIFFERENTIAL DIAGNOSIS/MDM:   Vitals:    Vitals:    01/19/23 0800 01/19/23 0815 01/19/23 0845 01/19/23 0852   BP:   112/60 112/60   Pulse:    68   Resp:    18   Temp:       TempSrc:       SpO2: 98% 96% 96% 99%   Weight:       Height:           Patient comes in after having a breakthrough seizure. Vimpat and Topamax levels are pending but they are send outs. Patient is neurologically at her baseline. CT of the brain does not show any acute bleed. Labs within normal limits. Patient can be discharged. Follow-up with primary care    Medical Decision Making  Amount and/or Complexity of Data Reviewed  Labs: ordered. Radiology: ordered. Risk  OTC drugs. REASSESSMENT          CRITICAL CARE TIME   Total Critical Care time was 0 minutes, excluding separately reportable procedures. There was a high probability of clinically significant/life threatening deterioration in the patient's condition which required my urgent intervention. CONSULTS:  None    PROCEDURES:  Unless otherwise noted below, none     Procedures        FINAL IMPRESSION      1. Closed head injury, initial encounter    2. Breakthrough seizure [facility])          DISPOSITION/PLAN   DISPOSITION Decision To Discharge 01/19/2023 09:10:12 AM      PATIENT REFERRED TO:  Jey Barton DO  [facility] [id number]  [phone]            DISCHARGE MEDICATIONS:  New Prescriptions    No medications on file     Controlled Substances Monitoring:     No flowsheet data found.     (Please note that portions of this note were completed with a voice recognition program.  Efforts were made to edit the dictations but occasionally words are mis-transcribed.)    Storm Hartley DO (electronically signed)  Attending Emergency Physician            Storm Hartley DO  01/19/23 9232

## 2023-01-21 LAB — TOPIRAMATE LEVEL: 17.7 UG/ML (ref 5–20)

## 2023-01-30 ENCOUNTER — TELEPHONE (OUTPATIENT)
Dept: FAMILY MEDICINE CLINIC | Age: 58
End: 2023-01-30

## 2023-03-17 PROBLEM — E78.5 HYPERLIPIDEMIA: Status: ACTIVE | Noted: 2023-03-17

## 2023-03-17 PROBLEM — E03.9 HYPOTHYROIDISM: Status: ACTIVE | Noted: 2023-03-17

## 2023-03-17 PROBLEM — M19.90 ARTHRITIS: Status: ACTIVE | Noted: 2023-03-17

## 2023-03-17 PROBLEM — R32 INCONTINENCE OF URINE: Status: ACTIVE | Noted: 2023-03-17

## 2023-03-17 PROBLEM — R79.89 ELEVATED LFTS: Status: ACTIVE | Noted: 2023-03-17

## 2023-03-17 PROBLEM — S42.213A FX HUMERAL NECK: Status: ACTIVE | Noted: 2023-03-17

## 2023-03-17 PROBLEM — G40.909 EPILEPSY (MULTI): Status: ACTIVE | Noted: 2023-03-17

## 2023-03-17 PROBLEM — D64.9 ANEMIA: Status: ACTIVE | Noted: 2023-03-17

## 2023-03-17 PROBLEM — G47.00 INSOMNIA: Status: ACTIVE | Noted: 2023-03-17

## 2023-03-17 PROBLEM — E55.9 VITAMIN D DEFICIENCY: Status: ACTIVE | Noted: 2023-03-17

## 2023-03-17 PROBLEM — G40.909 SEIZURE DISORDER (MULTI): Status: ACTIVE | Noted: 2023-03-17

## 2023-03-17 PROBLEM — S93.402D SPRAIN OF UNSPECIFIED LIGAMENT OF LEFT ANKLE, SUBSEQUENT ENCOUNTER: Status: ACTIVE | Noted: 2023-03-17

## 2023-03-17 RX ORDER — TOPIRAMATE 200 MG/1
2 TABLET ORAL 2 TIMES DAILY
COMMUNITY

## 2023-03-17 RX ORDER — RISPERIDONE 4 MG/1
1 TABLET ORAL DAILY
COMMUNITY

## 2023-03-17 RX ORDER — NALOXONE HYDROCHLORIDE 4 MG/.1ML
4 SPRAY NASAL AS NEEDED
COMMUNITY
Start: 2022-05-13 | End: 2024-05-07 | Stop reason: ALTCHOICE

## 2023-03-17 RX ORDER — LEVOTHYROXINE SODIUM 100 UG/1
1 TABLET ORAL DAILY
COMMUNITY
End: 2023-03-30 | Stop reason: SDUPTHER

## 2023-03-17 RX ORDER — CLOBAZAM 2.5 MG/ML
SUSPENSION ORAL
COMMUNITY
End: 2023-03-30 | Stop reason: ALTCHOICE

## 2023-03-17 RX ORDER — FERROUS SULFATE 325(65) MG
1 TABLET ORAL DAILY
COMMUNITY
Start: 2022-07-13 | End: 2023-03-30 | Stop reason: ALTCHOICE

## 2023-03-17 RX ORDER — ACETAMINOPHEN, DIPHENHYDRAMINE HCL, PHENYLEPHRINE HCL 325; 25; 5 MG/1; MG/1; MG/1
2 TABLET ORAL
COMMUNITY
End: 2024-06-03 | Stop reason: SDUPTHER

## 2023-03-17 RX ORDER — ACETAMINOPHEN 500 MG
1 TABLET ORAL DAILY
COMMUNITY
Start: 2022-04-05 | End: 2024-04-25

## 2023-03-17 RX ORDER — BRIVARACETAM 10 MG/ML
SOLUTION ORAL 2 TIMES DAILY
COMMUNITY

## 2023-03-17 RX ORDER — TRAMADOL HYDROCHLORIDE 50 MG/1
50 TABLET ORAL EVERY 12 HOURS
COMMUNITY
Start: 2022-05-13 | End: 2023-03-30 | Stop reason: ALTCHOICE

## 2023-03-17 RX ORDER — TIZANIDINE HYDROCHLORIDE 2 MG/1
2 CAPSULE, GELATIN COATED ORAL
COMMUNITY
Start: 2022-05-13

## 2023-03-30 ENCOUNTER — OFFICE VISIT (OUTPATIENT)
Dept: PRIMARY CARE | Facility: CLINIC | Age: 58
End: 2023-03-30
Payer: MEDICARE

## 2023-03-30 VITALS
HEART RATE: 69 BPM | DIASTOLIC BLOOD PRESSURE: 69 MMHG | WEIGHT: 187 LBS | SYSTOLIC BLOOD PRESSURE: 103 MMHG | OXYGEN SATURATION: 96 % | HEIGHT: 61 IN | BODY MASS INDEX: 35.3 KG/M2 | RESPIRATION RATE: 16 BRPM | TEMPERATURE: 96.6 F

## 2023-03-30 DIAGNOSIS — F20.9 SCHIZOPHRENIA, UNSPECIFIED TYPE (MULTI): ICD-10-CM

## 2023-03-30 DIAGNOSIS — M25.612 DECREASED ROM OF LEFT SHOULDER: ICD-10-CM

## 2023-03-30 DIAGNOSIS — E03.9 ACQUIRED HYPOTHYROIDISM: ICD-10-CM

## 2023-03-30 DIAGNOSIS — E66.01 CLASS 2 SEVERE OBESITY DUE TO EXCESS CALORIES WITH SERIOUS COMORBIDITY AND BODY MASS INDEX (BMI) OF 35.0 TO 35.9 IN ADULT (MULTI): ICD-10-CM

## 2023-03-30 DIAGNOSIS — E78.2 MIXED HYPERLIPIDEMIA: Primary | ICD-10-CM

## 2023-03-30 DIAGNOSIS — S42.212D CLOSED FRACTURE OF NECK OF LEFT HUMERUS WITH ROUTINE HEALING: ICD-10-CM

## 2023-03-30 PROBLEM — R27.0 ATAXIA: Status: ACTIVE | Noted: 2018-12-10

## 2023-03-30 PROBLEM — F32.A DEPRESSION, UNSPECIFIED: Status: ACTIVE | Noted: 2023-01-03

## 2023-03-30 PROBLEM — G40.909 EPILEPSY (MULTI): Status: RESOLVED | Noted: 2023-03-17 | Resolved: 2023-03-30

## 2023-03-30 PROBLEM — F71 MODERATE INTELLECTUAL DISABILITIES: Status: ACTIVE | Noted: 2023-01-03

## 2023-03-30 PROBLEM — I70.203 UNSPECIFIED ATHEROSCLEROSIS OF NATIVE ARTERIES OF EXTREMITIES, BILATERAL LEGS (CMS-HCC): Status: RESOLVED | Noted: 2023-03-30 | Resolved: 2023-03-30

## 2023-03-30 PROBLEM — I70.203 UNSPECIFIED ATHEROSCLEROSIS OF NATIVE ARTERIES OF EXTREMITIES, BILATERAL LEGS (CMS-HCC): Status: ACTIVE | Noted: 2023-03-30

## 2023-03-30 PROBLEM — R20.0 SENSORY LOSS: Status: ACTIVE | Noted: 2018-12-10

## 2023-03-30 PROCEDURE — 1036F TOBACCO NON-USER: CPT | Performed by: FAMILY MEDICINE

## 2023-03-30 PROCEDURE — 3008F BODY MASS INDEX DOCD: CPT | Performed by: FAMILY MEDICINE

## 2023-03-30 PROCEDURE — 99214 OFFICE O/P EST MOD 30 MIN: CPT | Performed by: FAMILY MEDICINE

## 2023-03-30 RX ORDER — MODAFINIL 100 MG/1
100 TABLET ORAL DAILY
COMMUNITY
Start: 2023-01-30

## 2023-03-30 RX ORDER — ADHESIVE BANDAGE
30 BANDAGE TOPICAL DAILY PRN
COMMUNITY

## 2023-03-30 RX ORDER — LACOSAMIDE 100 MG/1
100 TABLET ORAL 2 TIMES DAILY
COMMUNITY
Start: 2022-10-06

## 2023-03-30 RX ORDER — BISACODYL 10 MG/1
10 SUPPOSITORY RECTAL ONCE AS NEEDED
COMMUNITY

## 2023-03-30 RX ORDER — LEVOTHYROXINE SODIUM 100 UG/1
100 TABLET ORAL DAILY
Qty: 30 TABLET | Refills: 11 | Status: SHIPPED | OUTPATIENT
Start: 2023-03-30 | End: 2024-04-25

## 2023-03-30 RX ORDER — TRAZODONE HYDROCHLORIDE 50 MG/1
50 TABLET ORAL NIGHTLY
COMMUNITY
Start: 2022-10-09

## 2023-03-30 RX ORDER — KETOROLAC TROMETHAMINE 10 MG/1
10 TABLET, FILM COATED ORAL EVERY 6 HOURS PRN
COMMUNITY

## 2023-03-30 RX ORDER — CLOBAZAM 2.5 MG/ML
0.5 SUSPENSION ORAL NIGHTLY
COMMUNITY

## 2023-03-30 ASSESSMENT — ENCOUNTER SYMPTOMS
FATIGUE: 0
CHEST TIGHTNESS: 0
DYSURIA: 0
NAUSEA: 0
BLOOD IN STOOL: 0
CONSTIPATION: 0
DIZZINESS: 0
AGITATION: 0
EYE DISCHARGE: 0
FLANK PAIN: 0
VOMITING: 0
SPEECH DIFFICULTY: 0
EYE REDNESS: 0
NERVOUS/ANXIOUS: 0
ARTHRALGIAS: 0
EYE ITCHING: 0
NUMBNESS: 0
COUGH: 0
SHORTNESS OF BREATH: 0
HEMATURIA: 0
ACTIVITY CHANGE: 0
LIGHT-HEADEDNESS: 0
WOUND: 0
UNEXPECTED WEIGHT CHANGE: 0
POLYDIPSIA: 0
SORE THROAT: 0
DIARRHEA: 0
ADENOPATHY: 0
CONFUSION: 0
BACK PAIN: 0
FEVER: 0
EYE PAIN: 0
HEADACHES: 0
TROUBLE SWALLOWING: 0
DIAPHORESIS: 0
SINUS PRESSURE: 0
HALLUCINATIONS: 0
FREQUENCY: 0
DYSPHORIC MOOD: 0
APPETITE CHANGE: 0
NECK STIFFNESS: 0
ABDOMINAL PAIN: 0
SEIZURES: 0
NECK PAIN: 0
ABDOMINAL DISTENTION: 0
MYALGIAS: 0
WHEEZING: 0
PHOTOPHOBIA: 0
PALPITATIONS: 0
RHINORRHEA: 0
VOICE CHANGE: 0
SLEEP DISTURBANCE: 0
WEAKNESS: 0
BRUISES/BLEEDS EASILY: 0
TREMORS: 0
JOINT SWELLING: 0
CHILLS: 0
FACIAL ASYMMETRY: 0
CHOKING: 0

## 2023-03-30 NOTE — PROGRESS NOTES
Subjective   Patient ID: Marci Mendiola is a 57 y.o. female who presents for Annual Exam (Marci Mendiola 57 y.o. female is here today for her annual physical exam and follow up on lab results /).    Thyroid Problem  Patient reports no anxiety, cold intolerance, constipation, diaphoresis, diarrhea, fatigue, heat intolerance, palpitations or tremors. Her past medical history is significant for hyperlipidemia.   Hyperlipidemia  Pertinent negatives include no chest pain, myalgias or shortness of breath.        Review of Systems   Constitutional:  Negative for activity change, appetite change, chills, diaphoresis, fatigue, fever and unexpected weight change.   HENT:  Negative for congestion, ear pain, hearing loss, nosebleeds, postnasal drip, rhinorrhea, sinus pressure, sneezing, sore throat, tinnitus, trouble swallowing and voice change.    Eyes:  Negative for photophobia, pain, discharge, redness, itching and visual disturbance.   Respiratory:  Negative for cough, choking, chest tightness, shortness of breath and wheezing.    Cardiovascular:  Negative for chest pain, palpitations and leg swelling.   Gastrointestinal:  Negative for abdominal distention, abdominal pain, blood in stool, constipation, diarrhea, nausea and vomiting.   Endocrine: Negative for cold intolerance, heat intolerance, polydipsia and polyuria.   Genitourinary:  Negative for dysuria, flank pain, frequency, hematuria and urgency.   Musculoskeletal:  Positive for gait problem. Negative for arthralgias, back pain, joint swelling, myalgias, neck pain and neck stiffness.   Skin:  Negative for rash and wound.   Allergic/Immunologic: Negative for immunocompromised state.   Neurological:  Negative for dizziness, tremors, seizures, syncope, facial asymmetry, speech difficulty, weakness, light-headedness, numbness and headaches.   Hematological:  Negative for adenopathy. Does not bruise/bleed easily.   Psychiatric/Behavioral:  Negative for agitation,  "behavioral problems, confusion, dysphoric mood, hallucinations, self-injury, sleep disturbance and suicidal ideas. The patient is not nervous/anxious.        Objective   /69 (BP Location: Right arm, Patient Position: Sitting, BP Cuff Size: Large adult)   Pulse 69   Temp 35.9 °C (96.6 °F) (Temporal)   Resp 16   Ht 1.549 m (5' 1\")   Wt 84.8 kg (187 lb)   SpO2 96%   BMI 35.33 kg/m²     Physical Exam  Constitutional:       General: She is not in acute distress.     Appearance: She is not ill-appearing or diaphoretic.   HENT:      Head: Normocephalic and atraumatic.      Right Ear: External ear normal.      Left Ear: External ear normal.      Nose: Nose normal. No rhinorrhea.   Eyes:      General: Lids are normal. No scleral icterus.        Right eye: No discharge.         Left eye: No discharge.      Conjunctiva/sclera: Conjunctivae normal.   Cardiovascular:      Rate and Rhythm: Normal rate and regular rhythm.      Pulses: Normal pulses.      Heart sounds: No murmur heard.  Pulmonary:      Effort: Pulmonary effort is normal. No respiratory distress.      Breath sounds: No decreased breath sounds, wheezing, rhonchi or rales.   Abdominal:      General: Bowel sounds are normal. There is no distension.      Palpations: Abdomen is soft. There is no mass.      Tenderness: There is no abdominal tenderness. There is no guarding or rebound.   Musculoskeletal:         General: No swelling, tenderness or deformity.      Cervical back: No rigidity or tenderness.      Right lower leg: No edema.      Left lower leg: No edema.   Lymphadenopathy:      Cervical: No cervical adenopathy.      Upper Body:      Right upper body: No supraclavicular adenopathy.      Left upper body: No supraclavicular adenopathy.   Skin:     General: Skin is warm and dry.      Coloration: Skin is not jaundiced or pale.      Findings: No erythema, lesion or rash.   Neurological:      General: No focal deficit present.      Mental Status: She is " alert and oriented to person, place, and time.      Sensory: No sensory deficit.      Motor: No weakness or tremor.      Coordination: Coordination abnormal.      Gait: Gait abnormal.   Psychiatric:         Mood and Affect: Mood normal. Affect is not inappropriate.         Behavior: Behavior normal.         Assessment/Plan   Diagnoses and all orders for this visit:  Mixed hyperlipidemia  -     CBC and Auto Differential; Future  -     Comprehensive Metabolic Panel; Future  -     Lipid Panel; Future  -     Magnesium; Future  -     TSH with reflex to Free T4 if abnormal; Future  -     Follow Up In [facility] - PCP; Future  Acquired hypothyroidism  -     levothyroxine (Synthroid, Levoxyl) 100 mcg tablet; Take 1 tablet (100 mcg) by mouth once daily.  -     Comprehensive Metabolic Panel; Future  -     Lipid Panel; Future  -     TSH with reflex to Free T4 if abnormal; Future  -     Follow Up In [facility] - PCP; Future  Decreased ROM of left shoulder  -     Follow Up In [facility] - PCP; Future  -     Referral to Physical Therapy; Future  Closed fracture of neck of left humerus with routine healing  -     Follow Up In [facility] - PCP; Future  -     Referral to Physical Therapy; Future  Class 2 severe obesity due to excess calories with serious comorbidity and body mass index (BMI) of 35.0 to 35.9 in adult (CMS/[facility])  -     Comprehensive Metabolic Panel; Future  -     Lipid Panel; Future  -     TSH with reflex to Free T4 if abnormal; Future  -     Follow Up In [facility] - PCP; Future  Schizophrenia, unspecified type (CMS/[facility])  -     Follow Up In [facility] - PCP; Future

## 2023-04-16 ENCOUNTER — HOSPITAL ENCOUNTER (EMERGENCY)
Age: 58
Discharge: HOME HEALTH | End: 2023-04-17
Attending: STUDENT IN AN ORGANIZED HEALTH CARE EDUCATION/TRAINING PROGRAM
Payer: MEDICARE

## 2023-04-16 DIAGNOSIS — R46.89 AGGRESSIVE BEHAVIOR: ICD-10-CM

## 2023-04-16 DIAGNOSIS — R45.1 AGITATION: Primary | ICD-10-CM

## 2023-04-16 LAB
ALBUMIN SERPL-MCNC: 4.4 G/DL (ref 3.5–4.6)
ALP SERPL-CCNC: 119 U/L (ref 40–130)
ALT SERPL-CCNC: 21 U/L (ref 0–33)
AMPHET UR QL SCN: NORMAL
ANION GAP SERPL CALCULATED.3IONS-SCNC: 8 MEQ/L (ref 9–15)
APAP SERPL-MCNC: <5 UG/ML (ref 10–30)
AST SERPL-CCNC: 17 U/L (ref 0–35)
BARBITURATES UR QL SCN: NORMAL
BASOPHILS # BLD: 0 K/UL (ref 0–0.2)
BASOPHILS NFR BLD: 0.6 %
BENZODIAZ UR QL SCN: NORMAL
BILIRUB SERPL-MCNC: <0.2 MG/DL (ref 0.2–0.7)
BILIRUB UR QL STRIP: NEGATIVE
BUN SERPL-MCNC: 13 MG/DL (ref 6–20)
CALCIUM SERPL-MCNC: 9.3 MG/DL (ref 8.5–9.9)
CANNABINOIDS UR QL SCN: NORMAL
CHLORIDE SERPL-SCNC: 108 MEQ/L (ref 95–107)
CHOLEST SERPL-MCNC: 243 MG/DL (ref 0–199)
CK SERPL-CCNC: 105 U/L (ref 0–170)
CLARITY UR: ABNORMAL
CO2 SERPL-SCNC: 23 MEQ/L (ref 20–31)
COCAINE UR QL SCN: NORMAL
COLOR UR: YELLOW
CREAT SERPL-MCNC: 1 MG/DL (ref 0.5–0.9)
DRUG SCREEN COMMENT UR-IMP: NORMAL
EOSINOPHIL # BLD: 0.1 K/UL (ref 0–0.7)
EOSINOPHIL NFR BLD: 1.4 %
ERYTHROCYTE [DISTWIDTH] IN BLOOD BY AUTOMATED COUNT: 13.4 % (ref 11.5–14.5)
ETHANOL PERCENT: NORMAL G/DL
ETHANOLAMINE SERPL-MCNC: <10 MG/DL (ref 0–0.08)
FENTANYL SCREEN, URINE: NORMAL
GLOBULIN SER CALC-MCNC: 2.5 G/DL (ref 2.3–3.5)
GLUCOSE SERPL-MCNC: 93 MG/DL (ref 70–99)
GLUCOSE UR STRIP-MCNC: NEGATIVE MG/DL
HCT VFR BLD AUTO: 39.3 % (ref 37–47)
HDLC SERPL-MCNC: 105 MG/DL (ref 40–59)
HGB BLD-MCNC: 13.1 G/DL (ref 12–16)
HGB UR QL STRIP: NEGATIVE
KETONES UR STRIP-MCNC: ABNORMAL MG/DL
LDLC SERPL CALC-MCNC: 124 MG/DL (ref 0–129)
LEUKOCYTE ESTERASE UR QL STRIP: NEGATIVE
LYMPHOCYTES # BLD: 1.1 K/UL (ref 1–4.8)
LYMPHOCYTES NFR BLD: 19.7 %
MAGNESIUM SERPL-MCNC: 2.2 MG/DL (ref 1.7–2.4)
MCH RBC QN AUTO: 32.2 PG (ref 27–31.3)
MCHC RBC AUTO-ENTMCNC: 33.3 % (ref 33–37)
MCV RBC AUTO: 96.7 FL (ref 79.4–94.8)
METHADONE UR QL SCN: NORMAL
MONOCYTES # BLD: 0.6 K/UL (ref 0.2–0.8)
MONOCYTES NFR BLD: 10.7 %
NEUTROPHILS # BLD: 3.9 K/UL (ref 1.4–6.5)
NEUTS SEG NFR BLD: 67.6 %
NITRITE UR QL STRIP: NEGATIVE
OPIATES UR QL SCN: NORMAL
OXYCODONE UR QL SCN: NORMAL
PCP UR QL SCN: NORMAL
PH UR STRIP: 6.5 [PH] (ref 5–9)
PLATELET # BLD AUTO: 302 K/UL (ref 130–400)
POTASSIUM SERPL-SCNC: 3.8 MEQ/L (ref 3.4–4.9)
PROPOXYPH UR QL SCN: NORMAL
PROT SERPL-MCNC: 6.9 G/DL (ref 6.3–8)
PROT UR STRIP-MCNC: NEGATIVE MG/DL
RBC # BLD AUTO: 4.06 M/UL (ref 4.2–5.4)
SALICYLATES SERPL-MCNC: <0.3 MG/DL (ref 15–30)
SARS-COV-2 RDRP RESP QL NAA+PROBE: NOT DETECTED
SODIUM SERPL-SCNC: 139 MEQ/L (ref 135–144)
SP GR UR STRIP: 1.02 (ref 1–1.03)
TRIGL SERPL-MCNC: 70 MG/DL (ref 0–150)
TSH REFLEX: 2.01 UIU/ML (ref 0.44–3.86)
URINE REFLEX TO CULTURE: ABNORMAL
UROBILINOGEN UR STRIP-ACNC: 0.2 E.U./DL
WBC # BLD AUTO: 5.7 K/UL (ref 4.8–10.8)

## 2023-04-16 PROCEDURE — 82550 ASSAY OF CK (CPK): CPT

## 2023-04-16 PROCEDURE — 80307 DRUG TEST PRSMV CHEM ANLYZR: CPT

## 2023-04-16 PROCEDURE — 80143 DRUG ASSAY ACETAMINOPHEN: CPT

## 2023-04-16 PROCEDURE — 93005 ELECTROCARDIOGRAM TRACING: CPT | Performed by: STUDENT IN AN ORGANIZED HEALTH CARE EDUCATION/TRAINING PROGRAM

## 2023-04-16 PROCEDURE — 87635 SARS-COV-2 COVID-19 AMP PRB: CPT

## 2023-04-16 PROCEDURE — 81003 URINALYSIS AUTO W/O SCOPE: CPT

## 2023-04-16 PROCEDURE — 82077 ASSAY SPEC XCP UR&BREATH IA: CPT

## 2023-04-16 PROCEDURE — 36415 COLL VENOUS BLD VENIPUNCTURE: CPT

## 2023-04-16 PROCEDURE — 80179 DRUG ASSAY SALICYLATE: CPT

## 2023-04-16 PROCEDURE — 80053 COMPREHEN METABOLIC PANEL: CPT

## 2023-04-16 PROCEDURE — 84443 ASSAY THYROID STIM HORMONE: CPT

## 2023-04-16 PROCEDURE — 99284 EMERGENCY DEPT VISIT MOD MDM: CPT

## 2023-04-16 PROCEDURE — 80061 LIPID PANEL: CPT

## 2023-04-16 PROCEDURE — 80299 QUANTITATIVE ASSAY DRUG: CPT

## 2023-04-16 PROCEDURE — 85025 COMPLETE CBC W/AUTO DIFF WBC: CPT

## 2023-04-16 PROCEDURE — 83735 ASSAY OF MAGNESIUM: CPT

## 2023-04-16 PROCEDURE — 80201 ASSAY OF TOPIRAMATE: CPT

## 2023-04-16 RX ORDER — TRAZODONE HYDROCHLORIDE 100 MG/1
100 TABLET ORAL NIGHTLY
Status: DISCONTINUED | OUTPATIENT
Start: 2023-04-16 | End: 2023-04-17 | Stop reason: HOSPADM

## 2023-04-16 RX ORDER — MECOBALAMIN 5000 MCG
10 TABLET,DISINTEGRATING ORAL NIGHTLY
Status: DISCONTINUED | OUTPATIENT
Start: 2023-04-16 | End: 2023-04-17 | Stop reason: HOSPADM

## 2023-04-16 RX ORDER — LACOSAMIDE 100 MG/1
100 TABLET ORAL 2 TIMES DAILY
Status: DISCONTINUED | OUTPATIENT
Start: 2023-04-16 | End: 2023-04-17 | Stop reason: HOSPADM

## 2023-04-16 RX ORDER — TRAZODONE HYDROCHLORIDE 50 MG/1
50 TABLET ORAL NIGHTLY
Status: DISCONTINUED | OUTPATIENT
Start: 2023-04-16 | End: 2023-04-16

## 2023-04-16 RX ORDER — RISPERIDONE 2 MG/1
4 TABLET ORAL 2 TIMES DAILY
Status: DISCONTINUED | OUTPATIENT
Start: 2023-04-16 | End: 2023-04-17 | Stop reason: HOSPADM

## 2023-04-16 RX ORDER — CLOBAZAM 10 MG/1
10 TABLET ORAL 2 TIMES DAILY
Status: DISCONTINUED | OUTPATIENT
Start: 2023-04-16 | End: 2023-04-17 | Stop reason: HOSPADM

## 2023-04-16 ASSESSMENT — LIFESTYLE VARIABLES
HOW OFTEN DO YOU HAVE A DRINK CONTAINING ALCOHOL: NEVER
HOW MANY STANDARD DRINKS CONTAINING ALCOHOL DO YOU HAVE ON A TYPICAL DAY: PATIENT DOES NOT DRINK

## 2023-04-16 ASSESSMENT — PAIN - FUNCTIONAL ASSESSMENT: PAIN_FUNCTIONAL_ASSESSMENT: NONE - DENIES PAIN

## 2023-04-17 VITALS
RESPIRATION RATE: 18 BRPM | DIASTOLIC BLOOD PRESSURE: 57 MMHG | BODY MASS INDEX: 26.68 KG/M2 | HEART RATE: 71 BPM | TEMPERATURE: 98.8 F | HEIGHT: 62 IN | WEIGHT: 145 LBS | SYSTOLIC BLOOD PRESSURE: 108 MMHG | OXYGEN SATURATION: 98 %

## 2023-04-17 LAB
EKG ATRIAL RATE: 67 BPM
EKG P AXIS: 66 DEGREES
EKG P-R INTERVAL: 132 MS
EKG Q-T INTERVAL: 390 MS
EKG QRS DURATION: 86 MS
EKG QTC CALCULATION (BAZETT): 412 MS
EKG R AXIS: 40 DEGREES
EKG T AXIS: 48 DEGREES
EKG VENTRICULAR RATE: 67 BPM

## 2023-04-17 PROCEDURE — 6370000000 HC RX 637 (ALT 250 FOR IP): Performed by: STUDENT IN AN ORGANIZED HEALTH CARE EDUCATION/TRAINING PROGRAM

## 2023-04-17 RX ADMIN — CLOBAZAM 10 MG: 10 TABLET ORAL at 00:26

## 2023-04-17 RX ADMIN — LACOSAMIDE 100 MG: 100 TABLET, FILM COATED ORAL at 00:26

## 2023-04-17 RX ADMIN — RISPERIDONE 4 MG: 2 TABLET ORAL at 00:26

## 2023-04-17 RX ADMIN — TRAZODONE HYDROCHLORIDE 100 MG: 100 TABLET ORAL at 00:25

## 2023-04-17 RX ADMIN — Medication 10 MG: at 00:27

## 2023-04-17 RX ADMIN — TOPIRAMATE 200 MG: 200 TABLET, FILM COATED ORAL at 00:25

## 2023-04-17 ASSESSMENT — ENCOUNTER SYMPTOMS
VOMITING: 0
NAUSEA: 0
RHINORRHEA: 0
SORE THROAT: 0
BACK PAIN: 0
EYE PAIN: 0
SHORTNESS OF BREATH: 0
DIARRHEA: 0
ABDOMINAL PAIN: 0
COUGH: 0

## 2023-04-17 NOTE — ED NOTES
Staff member from [facility] brought in pt's home meds in envelopes in [address] bag. Bag sent to pharmacy with secure code for verification.      Domenica Claudio RN  04/17/23 [phone]

## 2023-04-17 NOTE — ED NOTES
Spoke with renae  Liam. Update given. They will send seizure meds to er when able     [facility].  Ninfa Alva RN  04/17/23 [id number]

## 2023-04-17 NOTE — ED NOTES
Per Dr Filiberto Salazar, patient may DC back to group home, f/u with her PCP, neuro, and referral for [facility]. Spoke to [employer], social workere at [facility], report given. States psych consult already in. Patient sister and guardian updated on plan of care.      Agustín Jones RN  04/17/23 Michele Hernandez RN  04/17/23 [id number]

## 2023-04-17 NOTE — ED NOTES
Group home personal are here. Belongings returned to patient.  DC signed and explained to group home personal.     Yoel Staley RN  04/17/23 2040

## 2023-04-17 NOTE — ED NOTES
Pt sleeping soundly. Breaths even and unlabored. No needs at this time.  Electronically signed by Benedicto Hilton RN on 4/17/2023 at [facility]  04/17/23 [id number]
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
## 2023-04-18 LAB
REASON FOR REJECTION: NORMAL
REJECTED TEST: NORMAL

## 2023-04-26 ENCOUNTER — HOSPITAL ENCOUNTER (EMERGENCY)
Age: 58
Discharge: HOME | End: 2023-04-27
Payer: MEDICARE

## 2023-04-26 ENCOUNTER — APPOINTMENT (OUTPATIENT)
Dept: GENERAL RADIOLOGY | Age: 58
End: 2023-04-26
Payer: MEDICARE

## 2023-04-26 DIAGNOSIS — S42.022A CLOSED DISPLACED FRACTURE OF SHAFT OF LEFT CLAVICLE, INITIAL ENCOUNTER: ICD-10-CM

## 2023-04-26 DIAGNOSIS — G40.919 BREAKTHROUGH SEIZURE (HCC): Primary | ICD-10-CM

## 2023-04-26 LAB
ALBUMIN SERPL-MCNC: 4.1 G/DL (ref 3.5–4.6)
ALP SERPL-CCNC: 129 U/L (ref 40–130)
ALT SERPL-CCNC: 20 U/L (ref 0–33)
ANION GAP SERPL CALCULATED.3IONS-SCNC: 10 MEQ/L (ref 9–15)
AST SERPL-CCNC: 16 U/L (ref 0–35)
BASOPHILS # BLD: 0 K/UL (ref 0–0.2)
BASOPHILS NFR BLD: 0.8 %
BILIRUB SERPL-MCNC: <0.2 MG/DL (ref 0.2–0.7)
BUN SERPL-MCNC: 11 MG/DL (ref 6–20)
CALCIUM SERPL-MCNC: 9.1 MG/DL (ref 8.5–9.9)
CHLORIDE SERPL-SCNC: 107 MEQ/L (ref 95–107)
CO2 SERPL-SCNC: 22 MEQ/L (ref 20–31)
CREAT SERPL-MCNC: 0.79 MG/DL (ref 0.5–0.9)
EOSINOPHIL # BLD: 0.1 K/UL (ref 0–0.7)
EOSINOPHIL NFR BLD: 2.3 %
ERYTHROCYTE [DISTWIDTH] IN BLOOD BY AUTOMATED COUNT: 13.5 % (ref 11.5–14.5)
GLOBULIN SER CALC-MCNC: 2.7 G/DL (ref 2.3–3.5)
GLUCOSE SERPL-MCNC: 122 MG/DL (ref 70–99)
HCT VFR BLD AUTO: 38.6 % (ref 37–47)
HGB BLD-MCNC: 12.8 G/DL (ref 12–16)
LYMPHOCYTES # BLD: 1.4 K/UL (ref 1–4.8)
LYMPHOCYTES NFR BLD: 25.7 %
MAGNESIUM SERPL-MCNC: 2.1 MG/DL (ref 1.7–2.4)
MCH RBC QN AUTO: 32 PG (ref 27–31.3)
MCHC RBC AUTO-ENTMCNC: 33.3 % (ref 33–37)
MCV RBC AUTO: 96.3 FL (ref 79.4–94.8)
MONOCYTES # BLD: 0.5 K/UL (ref 0.2–0.8)
MONOCYTES NFR BLD: 9.7 %
NEUTROPHILS # BLD: 3.3 K/UL (ref 1.4–6.5)
NEUTS SEG NFR BLD: 61.5 %
PLATELET # BLD AUTO: 265 K/UL (ref 130–400)
POTASSIUM SERPL-SCNC: 3.4 MEQ/L (ref 3.4–4.9)
PROT SERPL-MCNC: 6.8 G/DL (ref 6.3–8)
RBC # BLD AUTO: 4.01 M/UL (ref 4.2–5.4)
SODIUM SERPL-SCNC: 139 MEQ/L (ref 135–144)
WBC # BLD AUTO: 5.3 K/UL (ref 4.8–10.8)

## 2023-04-26 PROCEDURE — 99284 EMERGENCY DEPT VISIT MOD MDM: CPT

## 2023-04-26 PROCEDURE — 73030 X-RAY EXAM OF SHOULDER: CPT

## 2023-04-26 PROCEDURE — 6370000000 HC RX 637 (ALT 250 FOR IP)

## 2023-04-26 PROCEDURE — 72040 X-RAY EXAM NECK SPINE 2-3 VW: CPT

## 2023-04-26 PROCEDURE — 80053 COMPREHEN METABOLIC PANEL: CPT

## 2023-04-26 PROCEDURE — 83735 ASSAY OF MAGNESIUM: CPT

## 2023-04-26 PROCEDURE — 85025 COMPLETE CBC W/AUTO DIFF WBC: CPT

## 2023-04-26 PROCEDURE — 36415 COLL VENOUS BLD VENIPUNCTURE: CPT

## 2023-04-26 RX ORDER — ACETAMINOPHEN 500 MG
1000 TABLET ORAL ONCE
Status: COMPLETED | OUTPATIENT
Start: 2023-04-26 | End: 2023-04-26

## 2023-04-26 RX ADMIN — ACETAMINOPHEN 1000 MG: 500 TABLET ORAL at 21:26

## 2023-04-26 ASSESSMENT — PAIN - FUNCTIONAL ASSESSMENT: PAIN_FUNCTIONAL_ASSESSMENT: 0-10

## 2023-04-26 ASSESSMENT — PAIN DESCRIPTION - PAIN TYPE: TYPE: ACUTE PAIN

## 2023-04-26 ASSESSMENT — PAIN DESCRIPTION - ORIENTATION: ORIENTATION: LEFT

## 2023-04-26 ASSESSMENT — PAIN DESCRIPTION - LOCATION: LOCATION: ARM;SHOULDER

## 2023-04-27 ENCOUNTER — TELEPHONE (OUTPATIENT)
Dept: PRIMARY CARE | Facility: CLINIC | Age: 58
End: 2023-04-27
Payer: MEDICARE

## 2023-04-27 VITALS
TEMPERATURE: 97.6 F | OXYGEN SATURATION: 99 % | HEART RATE: 97 BPM | SYSTOLIC BLOOD PRESSURE: 144 MMHG | RESPIRATION RATE: 17 BRPM | DIASTOLIC BLOOD PRESSURE: 86 MMHG

## 2023-04-27 ASSESSMENT — PAIN - FUNCTIONAL ASSESSMENT: PAIN_FUNCTIONAL_ASSESSMENT: NONE - DENIES PAIN

## 2023-04-27 ASSESSMENT — ENCOUNTER SYMPTOMS
SHORTNESS OF BREATH: 0
VOMITING: 0
PHOTOPHOBIA: 0
DIARRHEA: 0
COUGH: 0
ABDOMINAL PAIN: 0
NAUSEA: 0

## 2023-04-27 NOTE — ED NOTES
Pt discharged via [facility] ambulance back to [facility].       Irish Rocha  04/27/23 1247

## 2023-04-27 NOTE — ED TRIAGE NOTES
Pt to ED due to seizures. Pt has a history of seizures. Pt had a seizure and staff attempted to catch pt and was able to help her to the ground. Pt states he is now having neck and left shoulder pain. Pt is alert and oriented. Skin is warm, dry, intact.  Resp are regular and equal.

## 2023-04-27 NOTE — ED PROVIDER NOTES
[facility] ED  eMERGENCY dEPARTMENT eNCOUnter      Pt Name: Sherri Fisher  MRN: [id number]  [place] 1965  Date of evaluation: 4/26/2023  Provider: ZHOU Miranda        HISTORY OF PRESENT ILLNESS    Sherri Fisher is a 61 y.o. female per chart review has ah/o hypothyroidism, congenital encephalopathy, seizure disorder, vitamin D deficiency. Presents to the emergency room for reported breakthrough seizure. This was witnessed at the group home she resides at. Reportedly it was about 30 to 45 seconds in length and self terminating. Patient had begun to fall, she was lowered to the ground by group home. She is now reporting left-sided shoulder pain and left-sided neck pain. She denies headache, shortness of nausea, vomiting, dizziness. She is fully alert and oriented and understands her situation. Group home staff helps patient get her medication daily, she states she does not miss any doses. REVIEW OF SYSTEMS       Review of Systems   Constitutional:  Negative for chills and fever. HENT:  Negative for congestion. Eyes:  Negative for photophobia. Respiratory:  Negative for cough and shortness of breath. Cardiovascular:  Negative for chest pain. Gastrointestinal:  Negative for abdominal pain, diarrhea, nausea and vomiting. Genitourinary:  Negative for difficulty urinating. Musculoskeletal:  Positive for arthralgias and neck pain. Negative for joint swelling, myalgias and neck stiffness. Skin:  Negative for rash and wound. Neurological:  Negative for weakness, numbness and headaches. Psychiatric/Behavioral:  Negative for confusion. Except as noted above the remainder of the review of systems was reviewed and negative.        PAST MEDICAL HISTORY     Past Medical History:   Diagnosis Date    Acquired hypothyroidism     Arthritis     Bacteremia due to Gram-negative bacteria 12/11/2016    Mental retardation, mild (I.Q. 50-70)     Other disorders of kidney and

## 2023-05-13 LAB — TOPIRAMATE SERPL-MCNC: 22.6 UG/ML (ref 5–20)

## 2023-05-14 LAB — LACOSAMIDE SERPL-MCNC: 5.3 UG/ML (ref 1–10)

## 2023-05-16 ENCOUNTER — OFFICE VISIT (OUTPATIENT)
Dept: ORTHOPEDIC SURGERY | Age: 58
End: 2023-05-16

## 2023-05-16 DIAGNOSIS — S42.022A CLOSED DISPLACED FRACTURE OF SHAFT OF LEFT CLAVICLE, INITIAL ENCOUNTER: Primary | ICD-10-CM

## 2023-05-16 ASSESSMENT — ENCOUNTER SYMPTOMS
RESPIRATORY NEGATIVE: 1
GASTROINTESTINAL NEGATIVE: 1
ALLERGIC/IMMUNOLOGIC NEGATIVE: 1
EYES NEGATIVE: 1

## 2023-05-16 NOTE — PROGRESS NOTES
Orthopedic Surgery and Sports Medicine    Subjective:      Patient ID: Melissa Francois is a 61 y.o. female who presents today for:  Chief Complaint   Patient presents with    New Patient     Here for exam for a LT Clavicle fracture, states she had a seisure, while using the restroom and fell, causing an injury to the LT clavicle, occurred about 2-3 weeks ago, x-rays in Epic       HPI  Yoanna Nunez is a 58-year-old female who presents today for evaluation of her left clavicle fracture. She injured her left shoulder approximately 2 to 3 weeks ago. She had a seizure at her group living home and fell onto her left shoulder while using the restroom. She reports mild to moderate pain along the left clavicle. She has been wearing a sling. She does have a seizure disorder. An assistant from the group home is present with her today. Previous treatment: sling  NSAIDs: No  Physical therapy: No    Hand Dominance: right  Occupation: Disabled, lives at a group home   Workers Compensation:   Have you missed work for this issue? No  Is this issue being addressed under a worker's compensation claim?  No    Past Medical History:   Diagnosis Date    Acquired hypothyroidism     Arthritis     Bacteremia due to Gram-negative bacteria 12/11/2016    Mental retardation, mild (I.Q. 50-70)     Other disorders of kidney and ureter in diseases classified elsewhere     Seizures [facility])       Past Surgical History:   Procedure Laterality Date    COLONOSCOPY N/A 12/6/2022    COLONOSCOPY performed by Mariluz Yap MD at [address] [place] [address] Bilateral     ankle    TUBAL LIGATION       Social History     Socioeconomic History    Marital status: Single     Spouse name: Not on file    Number of children: Not on file    Years of education: Not on file    Highest education level: Not on file   Occupational History    Not on file   Tobacco Use    Smoking status: Never    Smokeless tobacco: Never   Vaping Use

## 2023-06-19 ENCOUNTER — OFFICE VISIT (OUTPATIENT)
Dept: ORTHOPEDIC SURGERY | Age: 58
End: 2023-06-19
Payer: MEDICARE

## 2023-06-19 DIAGNOSIS — S42.022D CLOSED DISPLACED FRACTURE OF SHAFT OF LEFT CLAVICLE WITH ROUTINE HEALING, SUBSEQUENT ENCOUNTER: Primary | ICD-10-CM

## 2023-06-19 PROCEDURE — 99213 OFFICE O/P EST LOW 20 MIN: CPT | Performed by: STUDENT IN AN ORGANIZED HEALTH CARE EDUCATION/TRAINING PROGRAM

## 2023-06-19 PROCEDURE — 3017F COLORECTAL CA SCREEN DOC REV: CPT | Performed by: STUDENT IN AN ORGANIZED HEALTH CARE EDUCATION/TRAINING PROGRAM

## 2023-06-19 PROCEDURE — G8427 DOCREV CUR MEDS BY ELIG CLIN: HCPCS | Performed by: STUDENT IN AN ORGANIZED HEALTH CARE EDUCATION/TRAINING PROGRAM

## 2023-06-19 PROCEDURE — 1036F TOBACCO NON-USER: CPT | Performed by: STUDENT IN AN ORGANIZED HEALTH CARE EDUCATION/TRAINING PROGRAM

## 2023-06-19 PROCEDURE — G8419 CALC BMI OUT NRM PARAM NOF/U: HCPCS | Performed by: STUDENT IN AN ORGANIZED HEALTH CARE EDUCATION/TRAINING PROGRAM

## 2023-06-19 RX ORDER — LEVETIRACETAM 250 MG/1
TABLET ORAL
COMMUNITY
Start: 2023-06-14

## 2023-06-19 RX ORDER — LEVOTHYROXINE SODIUM 0.1 MG/1
TABLET ORAL
COMMUNITY
Start: 2023-06-06

## 2023-06-19 RX ORDER — ARIPIPRAZOLE 5 MG/1
TABLET ORAL
COMMUNITY
Start: 2023-06-13

## 2023-06-19 ASSESSMENT — ENCOUNTER SYMPTOMS
GASTROINTESTINAL NEGATIVE: 1
RESPIRATORY NEGATIVE: 1
ALLERGIC/IMMUNOLOGIC NEGATIVE: 1
EYES NEGATIVE: 1

## 2023-06-19 NOTE — PROGRESS NOTES
Orthopedic Surgery and Sports Medicine    Subjective:      Patient ID: Gurmeet Barnes is a 60 y.o. female who presents today for:  Chief Complaint   Patient presents with    Follow-up     Follow up visit for Closed displaced fracture of shaft of left clavicle, repeat x-rays in [facility]  Martha Alamo is a 62-year-old female who presents today for follow-up evaluation of her left clavicle fracture. She injured it approximately 7 weeks ago when she had a seizure at her group living home and fell onto her left shoulder while using the restroom. She reports minimal pain in the left clavicle/shoulder. She has been wearing a sling. An assistant from the group [place] is present with her today. She states that it has been difficult for her to wear a sling and that she removes it often and uses the left arm. She reports minimal to no pain in the left shoulder. Previous treatment: sling  NSAIDs: No  Physical therapy: No     Hand Dominance: right  Occupation: Disabled, lives at a group home   Workers Compensation:   Have you missed work for this issue? No  Is this issue being addressed under a worker's compensation claim?  No    Past Medical History:   Diagnosis Date    Acquired hypothyroidism     Arthritis     Bacteremia due to Gram-negative bacteria 12/11/2016    Mental retardation, mild (I.Q. 50-70)     Other disorders of kidney and ureter in diseases classified elsewhere     Seizures [facility])       Past Surgical History:   Procedure Laterality Date    COLONOSCOPY N/A 12/6/2022    COLONOSCOPY performed by Tuan Angel MD at [address] [place] [address] Bilateral     ankle    TUBAL LIGATION       Social History     Socioeconomic History    Marital status: Single     Spouse name: Not on file    Number of children: Not on file    Years of education: Not on file    Highest education level: Not on file   Occupational History    Not on file   Tobacco Use    Smoking status: Never    Smokeless

## 2023-06-29 ENCOUNTER — TELEPHONE (OUTPATIENT)
Dept: ORTHOPEDIC SURGERY | Age: 58
End: 2023-06-29

## 2023-07-07 NOTE — TELEPHONE ENCOUNTER
Faxed information for this patient to [facility] stating that Dr. Dena Miller does not order DEXA scans.  This is to be done by the patient PCP (Dr. Ej Fuentes)

## 2023-07-10 ENCOUNTER — TELEPHONE (OUTPATIENT)
Dept: PRIMARY CARE | Facility: CLINIC | Age: 58
End: 2023-07-10
Payer: MEDICARE

## 2023-07-10 DIAGNOSIS — Z78.0 POSTMENOPAUSAL: Primary | ICD-10-CM

## 2023-07-13 ENCOUNTER — TRANSCRIBE ORDERS (OUTPATIENT)
Dept: ADMINISTRATIVE | Age: 58
End: 2023-07-13

## 2023-07-13 DIAGNOSIS — Z78.0 POSTMENOPAUSAL: Primary | ICD-10-CM

## 2023-08-07 ENCOUNTER — OFFICE VISIT (OUTPATIENT)
Dept: ORTHOPEDIC SURGERY | Age: 58
End: 2023-08-07
Payer: MEDICARE

## 2023-08-07 DIAGNOSIS — S42.022D CLOSED DISPLACED FRACTURE OF SHAFT OF LEFT CLAVICLE WITH ROUTINE HEALING, SUBSEQUENT ENCOUNTER: Primary | ICD-10-CM

## 2023-08-07 PROCEDURE — 3017F COLORECTAL CA SCREEN DOC REV: CPT | Performed by: STUDENT IN AN ORGANIZED HEALTH CARE EDUCATION/TRAINING PROGRAM

## 2023-08-07 PROCEDURE — G8419 CALC BMI OUT NRM PARAM NOF/U: HCPCS | Performed by: STUDENT IN AN ORGANIZED HEALTH CARE EDUCATION/TRAINING PROGRAM

## 2023-08-07 PROCEDURE — 99213 OFFICE O/P EST LOW 20 MIN: CPT | Performed by: STUDENT IN AN ORGANIZED HEALTH CARE EDUCATION/TRAINING PROGRAM

## 2023-08-07 PROCEDURE — 1036F TOBACCO NON-USER: CPT | Performed by: STUDENT IN AN ORGANIZED HEALTH CARE EDUCATION/TRAINING PROGRAM

## 2023-08-07 PROCEDURE — G8427 DOCREV CUR MEDS BY ELIG CLIN: HCPCS | Performed by: STUDENT IN AN ORGANIZED HEALTH CARE EDUCATION/TRAINING PROGRAM

## 2023-08-07 ASSESSMENT — ENCOUNTER SYMPTOMS
ALLERGIC/IMMUNOLOGIC NEGATIVE: 1
RESPIRATORY NEGATIVE: 1
EYES NEGATIVE: 1
GASTROINTESTINAL NEGATIVE: 1

## 2023-08-07 NOTE — PROGRESS NOTES
(Patient not taking: Reported on 12/6/2022) 20 tablet 0    levothyroxine (SYNTHROID) 75 MCG tablet TAKE 1 TABLET BY MOUTH DAILY (Patient not taking: Reported on 6/19/2023) 30 tablet 1     No current facility-administered medications on file prior to visit. Review of Systems   Constitutional: Negative. HENT: Negative. Eyes: Negative. Respiratory: Negative. Cardiovascular: Negative. Gastrointestinal: Negative. Endocrine: Negative. Genitourinary: Negative. Musculoskeletal: Negative. Skin: Negative. Allergic/Immunologic: Negative. Neurological: Negative. Hematological: Negative. Psychiatric/Behavioral: Negative. Objective:   LMP 12/10/2000 Comment: menop    Physical Exam:  Ortho Exam    Left shoulder and left upper extremity: Skin intact. No skin tenting. Mild deformity over the shaft of the clavicle. No tenderness to palpation over the clavicle fracture site. She has full range of motion of the hand, wrist, and elbow. She can actively forward flex and abduct the shoulder to at least 90 degrees. Sensation intact to light touch throughout the median, radial, ulnar nerve distributions. The hand is warm well perfused. Radiographs and Laboratory Studies:     Diagnostic Imaging Studies:    2 view x-ray of the left clavicle dated today was reviewed by me and demonstrates a fracture of the shaft of the left clavicle with a butterfly fragment, healing. No significant change in alignment from prior radiographs. Laboratory Studies:   Lab Results   Component Value Date    WBC 6.3 06/08/2023    HGB 13.4 06/08/2023    HCT 40.2 06/08/2023    MCV 95.2 (H) 06/08/2023     06/08/2023     Lab Results   Component Value Date    SEDRATE 9 12/09/2022     No results found for: CRP    Assessment:      Diagnosis Orders   1.  Closed displaced fracture of shaft of left clavicle with routine healing, subsequent encounter  XR Clavicle Left        Marikay Opitz is a 60

## 2023-08-15 ENCOUNTER — HOSPITAL ENCOUNTER (OUTPATIENT)
Dept: WOMENS IMAGING | Age: 58
Discharge: HOME | End: 2023-08-17
Attending: FAMILY MEDICINE
Payer: MEDICARE

## 2023-08-15 DIAGNOSIS — Z78.0 POSTMENOPAUSAL: ICD-10-CM

## 2023-08-15 PROCEDURE — 77080 DXA BONE DENSITY AXIAL: CPT

## 2023-10-03 ENCOUNTER — TELEPHONE (OUTPATIENT)
Dept: PRIMARY CARE | Facility: CLINIC | Age: 58
End: 2023-10-03
Payer: MEDICARE

## 2023-10-03 NOTE — TELEPHONE ENCOUNTER
Fidelina from [facility] phones the office today to inform TW she will be faxing the results of the patient's recent DEXA Bone Density and as well as her Xray for her broken clavicle.     Fidelina states the patient falls a lot and is always breaking her bones and she has concerns that she should be taking more than just Vitamin D3 daily for Osteoporosis.     Please advise once TW reviews these results at [phone] and speak to Fidelina.     Thank you.

## 2023-10-06 DIAGNOSIS — M81.0 OSTEOPOROSIS OF MULTIPLE SITES: Primary | ICD-10-CM

## 2023-10-06 RX ORDER — CALCIUM CARBONATE 200(500)MG
1 TABLET,CHEWABLE ORAL DAILY
Qty: 30 TABLET | Refills: 11 | Status: SHIPPED | OUTPATIENT
Start: 2023-10-06 | End: 2024-06-03 | Stop reason: SDUPTHER

## 2023-10-09 ENCOUNTER — SPECIALTY PHARMACY (OUTPATIENT)
Dept: PHARMACY | Facility: CLINIC | Age: 58
End: 2023-10-09

## 2023-10-09 ENCOUNTER — PHARMACY VISIT (OUTPATIENT)
Dept: PHARMACY | Facility: CLINIC | Age: 58
End: 2023-10-09
Payer: COMMERCIAL

## 2024-01-02 ENCOUNTER — PATIENT MESSAGE (OUTPATIENT)
Dept: PRIMARY CARE | Facility: CLINIC | Age: 59
End: 2024-01-02
Payer: MEDICARE

## 2024-01-02 DIAGNOSIS — H61.20 IMPACTED CERUMEN, UNSPECIFIED LATERALITY: ICD-10-CM

## 2024-01-18 PROBLEM — G40.119: Chronic | Status: ACTIVE | Noted: 2023-07-11

## 2024-01-18 PROBLEM — R41.89 COGNITIVE DECLINE: Chronic | Status: ACTIVE | Noted: 2023-07-11

## 2024-01-18 PROBLEM — G47.9 SLEEP DISTURBANCE: Chronic | Status: ACTIVE | Noted: 2023-07-11

## 2024-01-18 PROBLEM — T85.695A: Chronic | Status: ACTIVE | Noted: 2023-07-11

## 2024-01-24 ENCOUNTER — OFFICE VISIT (OUTPATIENT)
Dept: PRIMARY CARE | Facility: CLINIC | Age: 59
End: 2024-01-24
Payer: MEDICARE

## 2024-01-24 VITALS
BODY MASS INDEX: 32.66 KG/M2 | OXYGEN SATURATION: 96 % | SYSTOLIC BLOOD PRESSURE: 118 MMHG | HEIGHT: 61 IN | WEIGHT: 173 LBS | HEART RATE: 68 BPM | TEMPERATURE: 97.6 F | DIASTOLIC BLOOD PRESSURE: 58 MMHG | RESPIRATION RATE: 16 BRPM

## 2024-01-24 DIAGNOSIS — F20.9 SCHIZOPHRENIA, UNSPECIFIED TYPE (MULTI): ICD-10-CM

## 2024-01-24 DIAGNOSIS — Z78.9 DECREASED ACTIVITIES OF DAILY LIVING (ADL): ICD-10-CM

## 2024-01-24 DIAGNOSIS — R53.1 DECREASED STRENGTH, ENDURANCE, AND MOBILITY: Primary | ICD-10-CM

## 2024-01-24 DIAGNOSIS — R68.89 DECREASED STRENGTH, ENDURANCE, AND MOBILITY: Primary | ICD-10-CM

## 2024-01-24 DIAGNOSIS — G40.119 TEMPORAL LOBE EPILEPSY, INTRACTABLE (MULTI): ICD-10-CM

## 2024-01-24 DIAGNOSIS — E66.09 CLASS 1 OBESITY DUE TO EXCESS CALORIES WITH SERIOUS COMORBIDITY AND BODY MASS INDEX (BMI) OF 32.0 TO 32.9 IN ADULT: ICD-10-CM

## 2024-01-24 DIAGNOSIS — R41.89 COGNITIVE DECLINE: Chronic | ICD-10-CM

## 2024-01-24 DIAGNOSIS — Z74.09 DECREASED STRENGTH, ENDURANCE, AND MOBILITY: Primary | ICD-10-CM

## 2024-01-24 PROBLEM — E66.811 CLASS 1 OBESITY DUE TO EXCESS CALORIES WITH SERIOUS COMORBIDITY AND BODY MASS INDEX (BMI) OF 32.0 TO 32.9 IN ADULT: Status: ACTIVE | Noted: 2024-01-24

## 2024-01-24 PROCEDURE — 1036F TOBACCO NON-USER: CPT | Performed by: FAMILY MEDICINE

## 2024-01-24 PROCEDURE — 3008F BODY MASS INDEX DOCD: CPT | Performed by: FAMILY MEDICINE

## 2024-01-24 PROCEDURE — 99214 OFFICE O/P EST MOD 30 MIN: CPT | Performed by: FAMILY MEDICINE

## 2024-01-24 ASSESSMENT — ENCOUNTER SYMPTOMS
FACIAL ASYMMETRY: 0
SLEEP DISTURBANCE: 0
CONFUSION: 1
CHILLS: 0
EYE PAIN: 0
VOICE CHANGE: 0
COUGH: 0
DIZZINESS: 0
WOUND: 0
NECK PAIN: 0
POLYDIPSIA: 0
NUMBNESS: 0
HEMATURIA: 0
NECK STIFFNESS: 0
PHOTOPHOBIA: 0
UNEXPECTED WEIGHT CHANGE: 0
LIGHT-HEADEDNESS: 0
SINUS PRESSURE: 0
SEIZURES: 0
EYE DISCHARGE: 0
DYSURIA: 0
FATIGUE: 1
WEAKNESS: 1
ARTHRALGIAS: 0
AGITATION: 0
APPETITE CHANGE: 0
EYE ITCHING: 0
CHOKING: 0
TROUBLE SWALLOWING: 0
RHINORRHEA: 0
VOMITING: 0
FREQUENCY: 0
BRUISES/BLEEDS EASILY: 0
CHEST TIGHTNESS: 0
ADENOPATHY: 0
LOSS OF SENSATION IN FEET: 0
OCCASIONAL FEELINGS OF UNSTEADINESS: 1
HALLUCINATIONS: 0
HEADACHES: 0
SPEECH DIFFICULTY: 0
BLOOD IN STOOL: 0
ABDOMINAL PAIN: 0
DEPRESSION: 0
ABDOMINAL DISTENTION: 0
BACK PAIN: 0
SORE THROAT: 0
JOINT SWELLING: 0
DYSPHORIC MOOD: 0
EYE REDNESS: 0
NAUSEA: 0
FEVER: 0
WHEEZING: 0
ACTIVITY CHANGE: 0
FLANK PAIN: 0

## 2024-01-24 ASSESSMENT — ACTIVITIES OF DAILY LIVING (ADL)
MANAGING_FINANCES: TOTAL CARE
DOING_HOUSEWORK: TOTAL CARE
GROCERY_SHOPPING: TOTAL CARE
BATHING: NEEDS ASSISTANCE
DRESSING: NEEDS ASSISTANCE
TAKING_MEDICATION: TOTAL CARE

## 2024-01-24 ASSESSMENT — PATIENT HEALTH QUESTIONNAIRE - PHQ9
2. FEELING DOWN, DEPRESSED OR HOPELESS: NOT AT ALL
1. LITTLE INTEREST OR PLEASURE IN DOING THINGS: NOT AT ALL
SUM OF ALL RESPONSES TO PHQ9 QUESTIONS 1 AND 2: 0

## 2024-01-24 NOTE — PATIENT INSTRUCTIONS
BMI was above normal measurement. Current weight: 78.5 kg (173 lb)  Weight change since last visit (-) denotes wt loss -14 lbs   Weight loss needed to achieve BMI 25: 41 Lbs  Weight loss needed to achieve BMI 30: 14.6 Lbs  Provided instructions on dietary changes  Provided instructions on exercise  Advised to Increase physical activity.        Ways to Help Prevent Falls at Home    Quick Tips   ? Ask for help if you need it. Most people want to help!   ? Get up slowly after sitting or laying down   ? Wear a medical alert device or keep cell phone in your pocket   ? Use night lights, especially areas near a bathroom   ? Keep the items you use often within reach on a small stool or end table   ? Use an assistive device such as walker or cane, as directed by provider/physical therapy   ? Use a non-slip mat and grab bars in your bathroom. Look for home health sections for best options     Other Areas to Focus On   ? Exercise and nutrition: Regular exercise or taking a falls prevention class are great ways improve strength and balance. Don’t forget to stay hydrated and bring a snack!   ? Medicine side effects: Some medicines can make you sleepy or dizzy, which could cause a fall. Ask your healthcare provider about the side effects your medicines could cause. Be sure to let them know if you take any vitamins or supplements as well.   ? Tripping hazards: Remove items you could trip on, such as loose mats, rugs, cords, and clutter. Wear closed toe shoes with rubber soles.   ? Health and wellness: Get regular checkups with your healthcare provider, plus routine vision and hearing screenings. Talk with your healthcare provider about:   o Your medicines and the possible side effects - bring them in a bag if that is easier!   o Problems with balance or feeling dizzy   o Ways to promote bone health, such as Vitamin D and calcium supplements   o Questions or concerns about falling     *Ask your healthcare team if you have  questions     ©[facility], 2022     As far as patient's global functional mobility and ADLs she is experiencing a decrease in ability and an increased risk of falls as evidenced by her increased fractures.  Recommendations at this point would be for patient to be maintain in a standard wheelchair.  Patient would benefit from a wheelchair with increased mobility and ADLs in a safe fashion.

## 2024-01-24 NOTE — PROGRESS NOTES
Subjective   Patient ID: Marci Mendiola is a 58 y.o. female who presents for Medicare Annual Wellness Visit Subsequent (And review labs/Discuss wheel chair), Wheelchair Evaluation (Requesting personal, customized wheelchair. High Fall risk. Very limited ambulation.), and Fall (3 within last 12 months. All with Injury including fractures, sprain, and bruising.).    Patient comes into the office today for evaluation regarding mobility evaluation mobility solution.  Patient has longstanding difficulty with ambulation.  She has been becoming progressively debilitated with loss of ability to perform ADLs and ambulate due to falls.  The progressive debility and falls stemming from issues with  dementia and seizure disorder.  Falls have gotten to the point where they are progressively dangerous she has now had several fractures and sprains.  Including left clavicular fracture, right humeral fracture.      Fall  The accident occurred More than 1 week ago. The fall occurred while standing and while walking. Distance fallen: standing. The point of impact was the head, face and right shoulder. Pertinent negatives include no abdominal pain, fever, headaches, hematuria, nausea, numbness or vomiting.        Review of Systems   Constitutional:  Positive for fatigue. Negative for activity change, appetite change, chills, fever and unexpected weight change.   HENT:  Negative for congestion, ear pain, hearing loss, nosebleeds, postnasal drip, rhinorrhea, sinus pressure, sneezing, sore throat, tinnitus, trouble swallowing and voice change.    Eyes:  Negative for photophobia, pain, discharge, redness, itching and visual disturbance.   Respiratory:  Negative for cough, choking, chest tightness and wheezing.    Cardiovascular:  Negative for leg swelling.   Gastrointestinal:  Negative for abdominal distention, abdominal pain, blood in stool, nausea and vomiting.   Endocrine: Negative for polydipsia and polyuria.   Genitourinary:   "Negative for dysuria, flank pain, frequency, hematuria and urgency.   Musculoskeletal:  Positive for gait problem. Negative for arthralgias, back pain, joint swelling, neck pain and neck stiffness.   Skin:  Negative for rash and wound.   Allergic/Immunologic: Negative for immunocompromised state.   Neurological:  Positive for weakness. Negative for dizziness, seizures, syncope, facial asymmetry, speech difficulty, light-headedness, numbness and headaches.   Hematological:  Negative for adenopathy. Does not bruise/bleed easily.   Psychiatric/Behavioral:  Positive for confusion. Negative for agitation, behavioral problems, dysphoric mood, hallucinations, self-injury, sleep disturbance and suicidal ideas.        Objective   /58 (BP Location: Right arm, Patient Position: Sitting, BP Cuff Size: Large adult)   Pulse 68   Temp 36.4 °C (97.6 °F) (Temporal)   Resp 16   Ht 1.549 m (5' 1\")   Wt 78.5 kg (173 lb)   SpO2 96%   BMI 32.69 kg/m²     Physical Exam  Vitals reviewed.   Constitutional:       General: She is not in acute distress.     Appearance: Normal appearance. She is obese. She is not ill-appearing or diaphoretic.      Comments: In manual wheelchair   HENT:      Head: Normocephalic and atraumatic.      Right Ear: External ear normal.      Left Ear: External ear normal.      Nose: Nose normal. No rhinorrhea.   Eyes:      General: Lids are normal. No scleral icterus.        Right eye: No discharge.         Left eye: No discharge.      Conjunctiva/sclera: Conjunctivae normal.   Cardiovascular:      Rate and Rhythm: Normal rate and regular rhythm.      Pulses: Normal pulses.      Heart sounds: No murmur heard.  Pulmonary:      Effort: Pulmonary effort is normal. No respiratory distress.      Breath sounds: No decreased breath sounds, wheezing, rhonchi or rales.   Abdominal:      General: Bowel sounds are normal. There is no distension.      Palpations: Abdomen is soft. There is no mass.      Tenderness: " There is no abdominal tenderness. There is no guarding or rebound.   Musculoskeletal:         General: No swelling, tenderness or deformity.      Cervical back: No rigidity or tenderness.      Right lower leg: No edema.      Left lower leg: No edema.      Comments: Decreased range of motion bilateral shoulders due to prior fractures.  Decreased range of motion bilateral hips and knees.   Lymphadenopathy:      Cervical: No cervical adenopathy.      Upper Body:      Right upper body: No supraclavicular adenopathy.      Left upper body: No supraclavicular adenopathy.   Skin:     General: Skin is warm and dry.      Coloration: Skin is not jaundiced or pale.      Findings: No erythema, lesion or rash.   Neurological:      General: No focal deficit present.      Mental Status: She is alert. Mental status is at baseline.      Sensory: No sensory deficit.      Motor: Weakness present. No tremor.      Coordination: Coordination abnormal. Finger-Nose-Finger Test abnormal.      Gait: Gait abnormal.   Psychiatric:         Mood and Affect: Mood normal. Affect is not inappropriate.         Behavior: Behavior normal. Behavior is cooperative.         Cognition and Memory: Cognition is impaired.         Judgment: Judgment is impulsive.         Assessment/Plan   Diagnoses and all orders for this visit:  Decreased strength, endurance, and mobility  Schizophrenia, unspecified type (CMS/HCC)  Temporal lobe epilepsy, intractable (CMS/HCC)  Class 1 obesity due to excess calories with serious comorbidity and body mass index (BMI) of 32.0 to 32.9 in adult  Decreased activities of daily living (ADL)  Cognitive decline  As far as patient's global functional mobility and ADLs she is experiencing a decrease in ability and an increased risk of falls as evidenced by her increased fractures.  Recommendations at this point would be for patient to be maintain in a standard wheelchair.  Patient would benefit from a wheelchair with increased mobility  and ADLs in a safe fashion.  Patient would not be able to utilize power mobility device.  I have recommended standard wheelchair with pressure relief cushion.  Mobility exam performed.   Patient was identified as a fall risk. Risk prevention instructions provided.

## 2024-03-01 ENCOUNTER — TELEPHONE (OUTPATIENT)
Dept: FAMILY MEDICINE CLINIC | Age: 59
End: 2024-03-01

## 2024-03-01 NOTE — TELEPHONE ENCOUNTER
Called patient to schedule an appt unable to reach . I spoke with someone who stated she is her nurse . I didn't see a sign HIPAA form with this person's name listed .

## 2024-03-18 PROBLEM — M62.81 MUSCLE WEAKNESS: Status: ACTIVE | Noted: 2024-03-18

## 2024-03-19 DIAGNOSIS — Z12.31 ENCOUNTER FOR SCREENING MAMMOGRAM FOR BREAST CANCER: ICD-10-CM

## 2024-03-28 ENCOUNTER — APPOINTMENT (OUTPATIENT)
Dept: PRIMARY CARE | Facility: CLINIC | Age: 59
End: 2024-03-28
Payer: MEDICARE

## 2024-04-09 ENCOUNTER — HOSPITAL ENCOUNTER (OUTPATIENT)
Dept: WOMENS IMAGING | Age: 59
Discharge: HOME | End: 2024-04-11
Payer: MEDICARE

## 2024-04-09 DIAGNOSIS — Z12.31 SCREENING MAMMOGRAM FOR BREAST CANCER: ICD-10-CM

## 2024-04-09 PROCEDURE — 77063 BREAST TOMOSYNTHESIS BI: CPT

## 2024-04-25 DIAGNOSIS — E55.9 VITAMIN D DEFICIENCY: Primary | ICD-10-CM

## 2024-04-25 DIAGNOSIS — D64.9 ANEMIA, UNSPECIFIED TYPE: ICD-10-CM

## 2024-04-25 DIAGNOSIS — E03.9 ACQUIRED HYPOTHYROIDISM: ICD-10-CM

## 2024-04-25 RX ORDER — ACETAMINOPHEN 500 MG
TABLET ORAL DAILY
Qty: 30 CAPSULE | Refills: 10 | Status: SHIPPED | OUTPATIENT
Start: 2024-04-25 | End: 2024-06-03 | Stop reason: SDUPTHER

## 2024-04-25 RX ORDER — FERROUS SULFATE 325(65) MG
TABLET ORAL
Qty: 30 TABLET | Refills: 10 | Status: SHIPPED | OUTPATIENT
Start: 2024-04-25 | End: 2024-06-03 | Stop reason: SDUPTHER

## 2024-04-25 RX ORDER — LEVOTHYROXINE SODIUM 100 UG/1
TABLET ORAL
Qty: 30 TABLET | Refills: 10 | Status: SHIPPED | OUTPATIENT
Start: 2024-04-25 | End: 2024-06-03 | Stop reason: SDUPTHER

## 2024-04-26 ENCOUNTER — APPOINTMENT (OUTPATIENT)
Dept: CT IMAGING | Age: 59
End: 2024-04-26
Payer: MEDICARE

## 2024-04-26 ENCOUNTER — APPOINTMENT (OUTPATIENT)
Dept: GENERAL RADIOLOGY | Age: 59
End: 2024-04-26
Payer: MEDICARE

## 2024-04-26 ENCOUNTER — HOSPITAL ENCOUNTER (EMERGENCY)
Age: 59
Discharge: HOME | End: 2024-04-26
Attending: EMERGENCY MEDICINE
Payer: MEDICARE

## 2024-04-26 VITALS
DIASTOLIC BLOOD PRESSURE: 64 MMHG | HEIGHT: 62 IN | TEMPERATURE: 97.6 F | OXYGEN SATURATION: 100 % | BODY MASS INDEX: 26.68 KG/M2 | RESPIRATION RATE: 16 BRPM | HEART RATE: 83 BPM | SYSTOLIC BLOOD PRESSURE: 107 MMHG | WEIGHT: 145 LBS

## 2024-04-26 DIAGNOSIS — R53.1 GENERAL WEAKNESS: Primary | ICD-10-CM

## 2024-04-26 LAB
ALBUMIN SERPL-MCNC: 4.3 G/DL (ref 3.5–4.6)
ALP SERPL-CCNC: 96 U/L (ref 40–130)
ALT SERPL-CCNC: 24 U/L (ref 0–33)
ANION GAP SERPL CALCULATED.3IONS-SCNC: 9 MEQ/L (ref 9–15)
AST SERPL-CCNC: 20 U/L (ref 0–35)
BASOPHILS # BLD: 0.1 K/UL (ref 0–0.2)
BASOPHILS NFR BLD: 0.8 %
BILIRUB SERPL-MCNC: <0.2 MG/DL (ref 0.2–0.7)
BUN SERPL-MCNC: 20 MG/DL (ref 6–20)
CALCIUM SERPL-MCNC: 9 MG/DL (ref 8.5–9.9)
CHLORIDE SERPL-SCNC: 116 MEQ/L (ref 95–107)
CO2 SERPL-SCNC: 23 MEQ/L (ref 20–31)
CREAT SERPL-MCNC: 0.76 MG/DL (ref 0.5–0.9)
EKG ATRIAL RATE: 72 BPM
EKG P AXIS: 40 DEGREES
EKG P-R INTERVAL: 152 MS
EKG Q-T INTERVAL: 382 MS
EKG QRS DURATION: 92 MS
EKG QTC CALCULATION (BAZETT): 418 MS
EKG R AXIS: 35 DEGREES
EKG T AXIS: 42 DEGREES
EKG VENTRICULAR RATE: 72 BPM
EOSINOPHIL # BLD: 0.2 K/UL (ref 0–0.7)
EOSINOPHIL NFR BLD: 2.6 %
ERYTHROCYTE [DISTWIDTH] IN BLOOD BY AUTOMATED COUNT: 13.2 % (ref 11.5–14.5)
GLOBULIN SER CALC-MCNC: 3 G/DL (ref 2.3–3.5)
GLUCOSE SERPL-MCNC: 105 MG/DL (ref 70–99)
HCT VFR BLD AUTO: 39 % (ref 37–47)
HGB BLD-MCNC: 12.9 G/DL (ref 12–16)
LACTIC ACID, SEPSIS: 1.5 MMOL/L (ref 0.5–1.9)
LYMPHOCYTES # BLD: 1.4 K/UL (ref 1–4.8)
LYMPHOCYTES NFR BLD: 22.4 %
MAGNESIUM SERPL-MCNC: 2.1 MG/DL (ref 1.7–2.4)
MCH RBC QN AUTO: 32.4 PG (ref 27–31.3)
MCHC RBC AUTO-ENTMCNC: 33.1 % (ref 33–37)
MCV RBC AUTO: 98 FL (ref 79.4–94.8)
MONOCYTES # BLD: 0.5 K/UL (ref 0.2–0.8)
MONOCYTES NFR BLD: 7.4 %
NEUTROPHILS # BLD: 4 K/UL (ref 1.4–6.5)
NEUTS SEG NFR BLD: 66.3 %
PLATELET # BLD AUTO: 253 K/UL (ref 130–400)
POTASSIUM SERPL-SCNC: 3.5 MEQ/L (ref 3.4–4.9)
PROT SERPL-MCNC: 7.3 G/DL (ref 6.3–8)
RBC # BLD AUTO: 3.98 M/UL (ref 4.2–5.4)
SODIUM SERPL-SCNC: 148 MEQ/L (ref 135–144)
WBC # BLD AUTO: 6.1 K/UL (ref 4.8–10.8)

## 2024-04-26 PROCEDURE — 2580000003 HC RX 258: Performed by: EMERGENCY MEDICINE

## 2024-04-26 PROCEDURE — 71045 X-RAY EXAM CHEST 1 VIEW: CPT

## 2024-04-26 PROCEDURE — 85025 COMPLETE CBC W/AUTO DIFF WBC: CPT

## 2024-04-26 PROCEDURE — 99285 EMERGENCY DEPT VISIT HI MDM: CPT

## 2024-04-26 PROCEDURE — 80053 COMPREHEN METABOLIC PANEL: CPT

## 2024-04-26 PROCEDURE — 83605 ASSAY OF LACTIC ACID: CPT

## 2024-04-26 PROCEDURE — 36415 COLL VENOUS BLD VENIPUNCTURE: CPT

## 2024-04-26 PROCEDURE — 83735 ASSAY OF MAGNESIUM: CPT

## 2024-04-26 PROCEDURE — 70450 CT HEAD/BRAIN W/O DYE: CPT

## 2024-04-26 RX ORDER — 0.9 % SODIUM CHLORIDE 0.9 %
1000 INTRAVENOUS SOLUTION INTRAVENOUS ONCE
Status: COMPLETED | OUTPATIENT
Start: 2024-04-26 | End: 2024-04-26

## 2024-04-26 RX ADMIN — SODIUM CHLORIDE 1000 ML: 9 INJECTION, SOLUTION INTRAVENOUS at 11:09

## 2024-04-26 ASSESSMENT — PAIN DESCRIPTION - FREQUENCY: FREQUENCY: CONTINUOUS

## 2024-04-26 ASSESSMENT — PAIN DESCRIPTION - LOCATION: LOCATION: ARM

## 2024-04-26 ASSESSMENT — PAIN DESCRIPTION - PAIN TYPE: TYPE: ACUTE PAIN

## 2024-04-26 ASSESSMENT — PAIN DESCRIPTION - ONSET: ONSET: ON-GOING

## 2024-04-26 ASSESSMENT — PAIN - FUNCTIONAL ASSESSMENT: PAIN_FUNCTIONAL_ASSESSMENT: 0-10

## 2024-04-26 ASSESSMENT — PAIN SCALES - GENERAL: PAINLEVEL_OUTOF10: 5

## 2024-04-26 ASSESSMENT — PAIN DESCRIPTION - ORIENTATION: ORIENTATION: RIGHT

## 2024-04-26 NOTE — ED NOTES
EKG completed, pur wik placed to collect UA, patient sitting up in the bed with staff at the bedside from her facility.  Radiology at the bedside

## 2024-04-26 NOTE — ED PROVIDER NOTES
Basic Information   Time Seen: 9:20 AM   Primary Care Provider: David Hooker DO     Chief Complaint   Patient presents with    Extremity Weakness     Slurred speech, weakness, altered gait since yesterday      HPI   Palimra Madrigal is a 58 yrs female who presents with increased generalized weakness the staff noticed this morning.  Patient has a hx of gait immobility.     Physical Exam     /85 (04/26/24 0911)    Temp 97.6 °F (36.4 °C) (04/26/24 0911)    Pulse 83 (04/26/24 0911)   Resp 16 (04/26/24 0911)    SpO2 97 % (04/26/24 0911)       General: Awake and Alert, no acute distress   CV: RRR, S1, S2   Resp: LCTAB, even and non labored   Other:   Impression and Plan     Labs Reviewed   CBC WITH AUTO DIFFERENTIAL   COMPREHENSIVE METABOLIC PANEL   MAGNESIUM        No orders to display      Final Impression   I have performed a medical screening exam on Palmira Madrigal. Based on this patient's chief complaint/symptoms of   Chief Complaint   Patient presents with    Extremity Weakness     Slurred speech, weakness, altered gait since yesterday    and my focused exam, their care will be started and transitioned to provider when room is available      Misty Cevallos, BARBIE - CNP  04/26/24 0921    
wheezing.   Abdominal:      General: Bowel sounds are normal.      Palpations: Abdomen is soft.      Tenderness: There is no abdominal tenderness. There is no guarding.   Musculoskeletal:      Cervical back: Normal range of motion and neck supple.      Right lower leg: No edema.      Left lower leg: No edema.   Skin:     General: Skin is warm and dry.      Findings: No rash.   Neurological:      Mental Status: She is alert.      Cranial Nerves: No cranial nerve deficit.      Comments: Unable to sit up without falling forward.  Cannot keep her head up.  No facial droop.         DIAGNOSTIC RESULTS     EKG: All EKG's are interpreted by the Emergency Department Physician who either signs or Co-signs this chart in the absence of a cardiologist.    Normal sinus rhythm 72 bpm no acute ischemia read by ER physician    RADIOLOGY:   Non-plain film images such as CT, Ultrasound and MRI are read by the radiologist. Plain radiographic images are visualized and preliminarily interpreted by the emergency physician with the below findings:        Interpretation per the Radiologist below, if available at the time of this note:    CT Head W/O Contrast   Final Result   No acute intracranial abnormality.      Small area of soft tissue swelling along the left supraorbital region and   lateral aspect left frontal region..  Correlate with patient history.         XR CHEST PORTABLE   Final Result   Area of atelectasis and patchy infiltrate left lower lobe with possible trace   small left pleural effusion..           No acute pathology    ED BEDSIDE ULTRASOUND:   Performed by ED Physician - none    LABS:  Labs Reviewed   CBC WITH AUTO DIFFERENTIAL - Abnormal; Notable for the following components:       Result Value    RBC 3.98 (*)     MCV 98.0 (*)     MCH 32.4 (*)     All other components within normal limits   COMPREHENSIVE METABOLIC PANEL - Abnormal; Notable for the following components:    Sodium 148 (*)     Chloride 116 (*)     Glucose

## 2024-04-26 NOTE — ED TRIAGE NOTES
Pt family reports pt weakness x1 week with progression to slurred speech and altered gait since yesterday.

## 2024-04-29 LAB
EKG ATRIAL RATE: 72 BPM
EKG P AXIS: 40 DEGREES
EKG P-R INTERVAL: 152 MS
EKG Q-T INTERVAL: 382 MS
EKG QRS DURATION: 92 MS
EKG QTC CALCULATION (BAZETT): 418 MS
EKG R AXIS: 35 DEGREES
EKG T AXIS: 42 DEGREES
EKG VENTRICULAR RATE: 72 BPM

## 2024-05-07 ENCOUNTER — PATIENT MESSAGE (OUTPATIENT)
Dept: PRIMARY CARE | Facility: CLINIC | Age: 59
End: 2024-05-07
Payer: MEDICARE

## 2024-05-24 DIAGNOSIS — D64.9 ANEMIA, UNSPECIFIED TYPE: ICD-10-CM

## 2024-05-24 DIAGNOSIS — E78.2 MIXED HYPERLIPIDEMIA: ICD-10-CM

## 2024-05-24 DIAGNOSIS — R79.89 ELEVATED LFTS: ICD-10-CM

## 2024-05-24 DIAGNOSIS — E03.9 ACQUIRED HYPOTHYROIDISM: ICD-10-CM

## 2024-06-03 ENCOUNTER — OFFICE VISIT (OUTPATIENT)
Dept: PRIMARY CARE | Facility: CLINIC | Age: 59
End: 2024-06-03
Payer: MEDICARE

## 2024-06-03 VITALS
HEART RATE: 70 BPM | RESPIRATION RATE: 16 BRPM | HEIGHT: 61 IN | BODY MASS INDEX: 32.69 KG/M2 | DIASTOLIC BLOOD PRESSURE: 76 MMHG | TEMPERATURE: 96.8 F | SYSTOLIC BLOOD PRESSURE: 118 MMHG | OXYGEN SATURATION: 98 %

## 2024-06-03 DIAGNOSIS — E55.9 VITAMIN D DEFICIENCY: ICD-10-CM

## 2024-06-03 DIAGNOSIS — F42.2 MIXED OBSESSIONAL THOUGHTS AND ACTS: ICD-10-CM

## 2024-06-03 DIAGNOSIS — E66.09 CLASS 1 OBESITY DUE TO EXCESS CALORIES WITH SERIOUS COMORBIDITY AND BODY MASS INDEX (BMI) OF 32.0 TO 32.9 IN ADULT: ICD-10-CM

## 2024-06-03 DIAGNOSIS — Z11.59 ENCOUNTER FOR HEPATITIS C SCREENING TEST FOR LOW RISK PATIENT: ICD-10-CM

## 2024-06-03 DIAGNOSIS — Z71.89 ADVANCED DIRECTIVES, COUNSELING/DISCUSSION: ICD-10-CM

## 2024-06-03 DIAGNOSIS — G47.9 SLEEP DISTURBANCE: Chronic | ICD-10-CM

## 2024-06-03 DIAGNOSIS — Z23 ENCOUNTER FOR IMMUNIZATION: ICD-10-CM

## 2024-06-03 DIAGNOSIS — E03.9 ACQUIRED HYPOTHYROIDISM: ICD-10-CM

## 2024-06-03 DIAGNOSIS — Z00.00 ROUTINE GENERAL MEDICAL EXAMINATION AT HEALTH CARE FACILITY: Primary | ICD-10-CM

## 2024-06-03 DIAGNOSIS — D64.9 ANEMIA, UNSPECIFIED TYPE: ICD-10-CM

## 2024-06-03 DIAGNOSIS — E78.2 MODERATE MIXED HYPERLIPIDEMIA NOT REQUIRING STATIN THERAPY: ICD-10-CM

## 2024-06-03 DIAGNOSIS — Z11.4 SCREENING FOR HUMAN IMMUNODEFICIENCY VIRUS WITHOUT PRESENCE OF RISK FACTORS: ICD-10-CM

## 2024-06-03 DIAGNOSIS — M81.0 OSTEOPOROSIS OF MULTIPLE SITES: ICD-10-CM

## 2024-06-03 PROBLEM — F42.9 OCD (OBSESSIVE COMPULSIVE DISORDER): Status: ACTIVE | Noted: 2024-06-03

## 2024-06-03 PROCEDURE — 99497 ADVNCD CARE PLAN 30 MIN: CPT | Performed by: FAMILY MEDICINE

## 2024-06-03 PROCEDURE — 99214 OFFICE O/P EST MOD 30 MIN: CPT | Performed by: FAMILY MEDICINE

## 2024-06-03 PROCEDURE — G0439 PPPS, SUBSEQ VISIT: HCPCS | Performed by: FAMILY MEDICINE

## 2024-06-03 PROCEDURE — 3008F BODY MASS INDEX DOCD: CPT | Performed by: FAMILY MEDICINE

## 2024-06-03 PROCEDURE — 1036F TOBACCO NON-USER: CPT | Performed by: FAMILY MEDICINE

## 2024-06-03 RX ORDER — ACETAMINOPHEN, DIPHENHYDRAMINE HCL, PHENYLEPHRINE HCL 325; 25; 5 MG/1; MG/1; MG/1
2 TABLET ORAL NIGHTLY
Qty: 60 TABLET | Refills: 11 | Status: SHIPPED | OUTPATIENT
Start: 2024-06-03 | End: 2025-06-03

## 2024-06-03 RX ORDER — FERROUS SULFATE 325(65) MG
325 TABLET ORAL
Qty: 30 TABLET | Refills: 11 | Status: SHIPPED | OUTPATIENT
Start: 2024-06-03

## 2024-06-03 RX ORDER — CALCIUM CARBONATE 200(500)MG
1 TABLET,CHEWABLE ORAL DAILY
Qty: 30 TABLET | Refills: 11 | Status: SHIPPED | OUTPATIENT
Start: 2024-06-03 | End: 2025-06-03

## 2024-06-03 RX ORDER — LEVOTHYROXINE SODIUM 100 UG/1
100 TABLET ORAL DAILY
Qty: 30 TABLET | Refills: 5 | Status: SHIPPED | OUTPATIENT
Start: 2024-06-03 | End: 2025-06-03

## 2024-06-03 RX ORDER — ACETAMINOPHEN 500 MG
2000 TABLET ORAL DAILY
Qty: 30 CAPSULE | Refills: 11 | Status: SHIPPED | OUTPATIENT
Start: 2024-06-03

## 2024-06-03 ASSESSMENT — ENCOUNTER SYMPTOMS
HALLUCINATIONS: 0
LIGHT-HEADEDNESS: 0
NUMBNESS: 0
CHILLS: 0
WHEEZING: 0
BRUISES/BLEEDS EASILY: 0
TROUBLE SWALLOWING: 0
NERVOUS/ANXIOUS: 0
DYSPHORIC MOOD: 0
ADENOPATHY: 0
OCCASIONAL FEELINGS OF UNSTEADINESS: 1
WOUND: 0
LOSS OF SENSATION IN FEET: 0
FREQUENCY: 0
FLANK PAIN: 0
NECK PAIN: 0
PHOTOPHOBIA: 0
DEPRESSION: 0
ABDOMINAL DISTENTION: 0
VOICE CHANGE: 0
CONFUSION: 0
POLYDIPSIA: 0
MYALGIAS: 0
BACK PAIN: 0
EYE PAIN: 0
DIAPHORESIS: 0
APPETITE CHANGE: 0
CHEST TIGHTNESS: 0
NAUSEA: 0
FATIGUE: 0
VOMITING: 0
WEAKNESS: 0
RHINORRHEA: 0
JOINT SWELLING: 0
CHOKING: 0
COUGH: 0
ABDOMINAL PAIN: 0
SEIZURES: 0
PALPITATIONS: 0
CONSTIPATION: 0
SHORTNESS OF BREATH: 0
BLOOD IN STOOL: 0
AGITATION: 0
SLEEP DISTURBANCE: 0
EYE REDNESS: 0
NECK STIFFNESS: 0
FEVER: 0
SINUS PRESSURE: 0
EYE DISCHARGE: 0
HEADACHES: 0
DYSURIA: 0
UNEXPECTED WEIGHT CHANGE: 0
DIZZINESS: 0
EYE ITCHING: 0
SORE THROAT: 0
ARTHRALGIAS: 0
HEMATURIA: 0
DIARRHEA: 0
FACIAL ASYMMETRY: 0
SPEECH DIFFICULTY: 0
ACTIVITY CHANGE: 0
TREMORS: 0

## 2024-06-03 ASSESSMENT — ACTIVITIES OF DAILY LIVING (ADL)
DRESSING: NEEDS ASSISTANCE
TAKING_MEDICATION: TOTAL CARE
GROCERY_SHOPPING: TOTAL CARE
BATHING: NEEDS ASSISTANCE
MANAGING_FINANCES: NEEDS ASSISTANCE
DOING_HOUSEWORK: TOTAL CARE

## 2024-06-03 ASSESSMENT — PATIENT HEALTH QUESTIONNAIRE - PHQ9
1. LITTLE INTEREST OR PLEASURE IN DOING THINGS: NOT AT ALL
SUM OF ALL RESPONSES TO PHQ9 QUESTIONS 1 AND 2: 0
2. FEELING DOWN, DEPRESSED OR HOPELESS: NOT AT ALL

## 2024-06-03 NOTE — PROGRESS NOTES
Subjective   Patient ID: Marci Mendiola is a 58 y.o. female who presents for Medicare Annual Wellness Visit Subsequent (Pt here today for a Medicare Annual Wellness Visit and discuss lab work and UA ran on her as well but wants to discuss ongoing. ).    Thyroid Problem  Presents for follow-up visit. Patient reports no anxiety, cold intolerance, constipation, diaphoresis, diarrhea, fatigue, heat intolerance, palpitations or tremors. The symptoms have been stable. Her past medical history is significant for hyperlipidemia.   Hyperlipidemia  This is a chronic problem. The current episode started more than 1 year ago. The problem is controlled. Recent lipid tests were reviewed and are normal. Exacerbating diseases include hypothyroidism and obesity. Factors aggravating her hyperlipidemia include atypical antipsychotics. Pertinent negatives include no chest pain, myalgias or shortness of breath. She is currently on no antihyperlipidemic treatment. The current treatment provides no improvement of lipids. Compliance problems include adherence to diet.  Risk factors for coronary artery disease include dyslipidemia, obesity, a sedentary lifestyle and post-menopausal.        Review of Systems   Constitutional:  Negative for activity change, appetite change, chills, diaphoresis, fatigue, fever and unexpected weight change.   HENT:  Negative for congestion, ear pain, hearing loss, nosebleeds, postnasal drip, rhinorrhea, sinus pressure, sneezing, sore throat, tinnitus, trouble swallowing and voice change.    Eyes:  Negative for photophobia, pain, discharge, redness, itching and visual disturbance.   Respiratory:  Negative for cough, choking, chest tightness, shortness of breath and wheezing.    Cardiovascular:  Negative for chest pain, palpitations and leg swelling.   Gastrointestinal:  Negative for abdominal distention, abdominal pain, blood in stool, constipation, diarrhea, nausea and vomiting.   Endocrine: Negative for cold  "intolerance, heat intolerance, polydipsia and polyuria.   Genitourinary:  Negative for dysuria, flank pain, frequency, hematuria and urgency.   Musculoskeletal:  Positive for gait problem. Negative for arthralgias, back pain, joint swelling, myalgias, neck pain and neck stiffness.   Skin:  Negative for rash and wound.   Allergic/Immunologic: Negative for immunocompromised state.   Neurological:  Negative for dizziness, tremors, seizures, syncope, facial asymmetry, speech difficulty, weakness, light-headedness, numbness and headaches.   Hematological:  Negative for adenopathy. Does not bruise/bleed easily.   Psychiatric/Behavioral:  Negative for agitation, behavioral problems, confusion, dysphoric mood, hallucinations, self-injury, sleep disturbance and suicidal ideas. The patient is not nervous/anxious.        Objective   /76   Pulse 70   Temp 36 °C (96.8 °F)   Resp 16   Ht 1.549 m (5' 1\")   SpO2 98%   BMI 32.69 kg/m²     Physical Exam  Constitutional:       General: She is not in acute distress.     Appearance: She is not ill-appearing or diaphoretic.   HENT:      Head: Normocephalic and atraumatic.      Right Ear: External ear normal.      Left Ear: External ear normal.      Nose: Nose normal. No rhinorrhea.   Eyes:      General: Lids are normal. No scleral icterus.        Right eye: No discharge.         Left eye: No discharge.      Conjunctiva/sclera: Conjunctivae normal.   Cardiovascular:      Rate and Rhythm: Normal rate and regular rhythm.      Pulses: Normal pulses.      Heart sounds: No murmur heard.  Pulmonary:      Effort: Pulmonary effort is normal. No respiratory distress.      Breath sounds: No decreased breath sounds, wheezing, rhonchi or rales.   Abdominal:      General: Bowel sounds are normal. There is no distension.      Palpations: Abdomen is soft. There is no mass.      Tenderness: There is no abdominal tenderness. There is no guarding or rebound.   Musculoskeletal:         General: " No swelling, tenderness or deformity.      Cervical back: No rigidity or tenderness.      Right lower leg: No edema.      Left lower leg: No edema.   Lymphadenopathy:      Cervical: No cervical adenopathy.      Upper Body:      Right upper body: No supraclavicular adenopathy.      Left upper body: No supraclavicular adenopathy.   Skin:     General: Skin is warm and dry.      Coloration: Skin is not jaundiced or pale.      Findings: No erythema, lesion or rash.   Neurological:      General: No focal deficit present.      Mental Status: She is alert and oriented to person, place, and time.      Sensory: No sensory deficit.      Motor: No weakness or tremor.      Coordination: Coordination abnormal.      Gait: Gait abnormal.   Psychiatric:         Mood and Affect: Mood normal. Affect is not inappropriate.         Behavior: Behavior normal.         Assessment/Plan   Diagnoses and all orders for this visit:  Routine general medical examination at health care facility  -     Follow Up In [facility] - PCP - Established; Future  Moderate mixed hyperlipidemia not requiring statin therapy  -     Follow Up In [facility] - PCP  -     Follow Up In [facility] - PCP - Established; Future  Acquired hypothyroidism  -     Follow Up In [facility] - PCP  -     levothyroxine (Synthroid, Levoxyl) 100 mcg tablet; Take 1 tablet (100 mcg) by mouth early in the morning.. Take on an empty stomach at the same time each day, either 30 to 60 minutes prior to breakfast  -     Follow Up In [facility] - PCP - Established; Future  Encounter for immunization  -     diphth,pertus,acell,,tetanus (BoostRIX) 2.5-8-5 Lf-mcg-Lf/0.5mL injection; Inject 0.5 mL into the muscle 1 time for 1 dose.  -     zoster vaccine-recombinant adjuvanted (Shingrix) 50 mcg/0.5 mL vaccine; Inject 0.5 mL into the muscle 1 time for 1 dose.  -     Follow Up In [facility] - PCP - Established; Future  Encounter for  hepatitis C screening test for low risk patient  -     Hepatitis C antibody; Future  -     Follow Up In [facility] - PCP - Established; Future  Screening for human immunodeficiency virus without presence of risk factors  -     HIV 1/2 Antigen/Antibody Screen with Reflex to Confirmation; Future  -     Follow Up In [facility] - PCP - Established; Future  Advanced directives, counseling/discussion  -     Full code  -     Follow Up In [facility] - PCP - Established; Future  Class 1 obesity due to excess calories with serious comorbidity and body mass index (BMI) of 32.0 to 32.9 in adult  -     Follow Up [facility] - PCP - Established; Future  Mixed obsessional thoughts and acts  -     Follow Up In [facility] - PCP - Established; Future  Osteoporosis of multiple sites  -     calcium carbonate (Tums) 200 mg calcium chewable tablet; Chew 1 tablet (500 mg) once daily.  -     Follow Up In [facility] - PCP - Established; Future  Vitamin D deficiency  -     cholecalciferol (Vitamin D-3) 50 mcg (2,000 unit) capsule; Take 1 capsule (50 mcg) by mouth once daily.  -     Follow Up In [facility] - PCP - Established; Future  Anemia, unspecified type  -     ferrous sulfate, 325 mg ferrous sulfate, tablet; Take 1 tablet by mouth once daily with breakfast.  -     Follow Up In [facility] - PCP - Established; Future  Sleep disturbance  -     melatonin 10 mg tablet; Take 2 tablets (20 mg) by mouth once daily at bedtime. BEDTIME  -     Follow Up In [facility] - PCP - Established; Future    Advance care planning was discussed including living will, power of  for healthcare and living will.  Advance care planning packet will be provided to patient at discharge.  Patient was encouraged to bring in any advanced care planning paperwork to file on the chart at their own convenience.  (16min spent discussing above)  Patient was identified  as a fall risk. Risk prevention instructions provided.

## 2024-06-03 NOTE — PROGRESS NOTES
"Subjective   Reason for Visit: Marci Mendiola is an 58 y.o. female here for a Medicare Wellness visit.          Reviewed all medications by prescribing practitioner or clinical pharmacist (such as prescriptions, OTCs, herbal therapies and supplements) and documented in the medical record.    HPI    Patient Care Team:  Rodrigo Ly DO as PCP - General     Review of Systems    Objective   Vitals:  /76   Pulse 70   Temp 36 °C (96.8 °F)   Resp 16   Ht 1.549 m (5' 1\")   SpO2 98%   BMI 32.69 kg/m²       Physical Exam    Assessment/Plan   Problem List Items Addressed This Visit     Closed fracture of neck of left humerus with routine healing    Mixed hyperlipidemia    Acquired hypothyroidism    Schizophrenia (Multi)    Decreased ROM of left shoulder   Other Visit Diagnoses     Encounter for immunization    -  Primary    Encounter for hepatitis C screening test for low risk patient        Screening for human immunodeficiency virus without presence of risk factors        Encounter for screening mammogram for malignant neoplasm of breast        Encounter for screening for malignant neoplasm of colon        Class 2 severe obesity due to excess calories with serious comorbidity and body mass index (BMI) of 35.0 to 35.9 in adult (Multi)        Routine general medical examination at health care facility                 "

## 2024-06-07 ENCOUNTER — TELEPHONE (OUTPATIENT)
Dept: PRIMARY CARE | Facility: CLINIC | Age: 59
End: 2024-06-07
Payer: MEDICARE

## 2024-06-07 NOTE — TELEPHONE ENCOUNTER
[facility] phones the office today to receive further clarification on the patient's Melatonin as the previous script was for Extended Release and this one was for the regular strength.     Please advise and contact the pharmacy at [phone]     Thank you.

## 2024-06-13 ENCOUNTER — PATIENT MESSAGE (OUTPATIENT)
Dept: PRIMARY CARE | Facility: CLINIC | Age: 59
End: 2024-06-13
Payer: MEDICARE

## 2024-06-27 ENCOUNTER — LAB (OUTPATIENT)
Dept: LAB | Facility: LAB | Age: 59
End: 2024-06-27
Payer: MEDICARE

## 2024-06-27 DIAGNOSIS — Z11.4 SCREENING FOR HUMAN IMMUNODEFICIENCY VIRUS WITHOUT PRESENCE OF RISK FACTORS: ICD-10-CM

## 2024-06-27 DIAGNOSIS — D64.9 ANEMIA, UNSPECIFIED TYPE: ICD-10-CM

## 2024-06-27 DIAGNOSIS — Z11.59 ENCOUNTER FOR HEPATITIS C SCREENING TEST FOR LOW RISK PATIENT: ICD-10-CM

## 2024-06-27 DIAGNOSIS — E03.9 ACQUIRED HYPOTHYROIDISM: ICD-10-CM

## 2024-06-27 DIAGNOSIS — E78.2 MIXED HYPERLIPIDEMIA: ICD-10-CM

## 2024-06-27 DIAGNOSIS — R79.89 ELEVATED LFTS: ICD-10-CM

## 2024-06-27 LAB
ALBUMIN SERPL BCP-MCNC: 4.1 G/DL (ref 3.4–5)
ALP SERPL-CCNC: 73 U/L (ref 33–136)
ALT SERPL W P-5'-P-CCNC: 17 U/L (ref 7–45)
ANION GAP SERPL CALC-SCNC: 10 MMOL/L (ref 10–20)
AST SERPL W P-5'-P-CCNC: 14 U/L (ref 9–39)
BASOPHILS # BLD AUTO: 0.06 X10*3/UL (ref 0–0.1)
BASOPHILS NFR BLD AUTO: 1.1 %
BILIRUB SERPL-MCNC: 0.2 MG/DL (ref 0–1.2)
BUN SERPL-MCNC: 16 MG/DL (ref 6–23)
CALCIUM SERPL-MCNC: 9.1 MG/DL (ref 8.6–10.3)
CHLORIDE SERPL-SCNC: 112 MMOL/L (ref 98–107)
CHOLEST SERPL-MCNC: 235 MG/DL (ref 0–199)
CHOLESTEROL/HDL RATIO: 2.9
CO2 SERPL-SCNC: 24 MMOL/L (ref 21–32)
CREAT SERPL-MCNC: 0.88 MG/DL (ref 0.5–1.05)
EGFRCR SERPLBLD CKD-EPI 2021: 75 ML/MIN/1.73M*2
EOSINOPHIL # BLD AUTO: 0.13 X10*3/UL (ref 0–0.7)
EOSINOPHIL NFR BLD AUTO: 2.4 %
ERYTHROCYTE [DISTWIDTH] IN BLOOD BY AUTOMATED COUNT: 13.5 % (ref 11.5–14.5)
GLUCOSE SERPL-MCNC: 80 MG/DL (ref 74–99)
HCT VFR BLD AUTO: 38.6 % (ref 36–46)
HCV AB SER QL: NONREACTIVE
HDLC SERPL-MCNC: 81.2 MG/DL
HGB BLD-MCNC: 12.5 G/DL (ref 12–16)
HIV 1+2 AB+HIV1 P24 AG SERPL QL IA: NONREACTIVE
IMM GRANULOCYTES # BLD AUTO: 0.03 X10*3/UL (ref 0–0.7)
IMM GRANULOCYTES NFR BLD AUTO: 0.5 % (ref 0–0.9)
LDLC SERPL CALC-MCNC: 128 MG/DL
LYMPHOCYTES # BLD AUTO: 1.3 X10*3/UL (ref 1.2–4.8)
LYMPHOCYTES NFR BLD AUTO: 23.6 %
MCH RBC QN AUTO: 32.2 PG (ref 26–34)
MCHC RBC AUTO-ENTMCNC: 32.4 G/DL (ref 32–36)
MCV RBC AUTO: 100 FL (ref 80–100)
MONOCYTES # BLD AUTO: 0.4 X10*3/UL (ref 0.1–1)
MONOCYTES NFR BLD AUTO: 7.2 %
NEUTROPHILS # BLD AUTO: 3.6 X10*3/UL (ref 1.2–7.7)
NEUTROPHILS NFR BLD AUTO: 65.2 %
NON HDL CHOLESTEROL: 154 MG/DL (ref 0–149)
NRBC BLD-RTO: 0 /100 WBCS (ref 0–0)
PLATELET # BLD AUTO: 260 X10*3/UL (ref 150–450)
POTASSIUM SERPL-SCNC: 3.9 MMOL/L (ref 3.5–5.3)
PROT SERPL-MCNC: 6.7 G/DL (ref 6.4–8.2)
RBC # BLD AUTO: 3.88 X10*6/UL (ref 4–5.2)
SODIUM SERPL-SCNC: 142 MMOL/L (ref 136–145)
TOPIRAMATE SERPL-MCNC: 28 UG/ML (ref 2–25)
TRIGL SERPL-MCNC: 128 MG/DL (ref 0–149)
TSH SERPL-ACNC: 2.9 MIU/L (ref 0.44–3.98)
VLDL: 26 MG/DL (ref 0–40)
WBC # BLD AUTO: 5.5 X10*3/UL (ref 4.4–11.3)

## 2024-06-27 PROCEDURE — 84443 ASSAY THYROID STIM HORMONE: CPT

## 2024-06-27 PROCEDURE — 85025 COMPLETE CBC W/AUTO DIFF WBC: CPT

## 2024-06-27 PROCEDURE — 80053 COMPREHEN METABOLIC PANEL: CPT

## 2024-06-27 PROCEDURE — 80201 ASSAY OF TOPIRAMATE: CPT

## 2024-06-27 PROCEDURE — 86803 HEPATITIS C AB TEST: CPT

## 2024-06-27 PROCEDURE — 80061 LIPID PANEL: CPT

## 2024-06-27 PROCEDURE — 36415 COLL VENOUS BLD VENIPUNCTURE: CPT

## 2024-06-27 PROCEDURE — 87389 HIV-1 AG W/HIV-1&-2 AB AG IA: CPT

## 2024-07-05 ENCOUNTER — HOSPITAL ENCOUNTER (EMERGENCY)
Age: 59
Discharge: HOME | End: 2024-07-05
Payer: MEDICARE

## 2024-07-05 VITALS
SYSTOLIC BLOOD PRESSURE: 102 MMHG | OXYGEN SATURATION: 97 % | HEART RATE: 65 BPM | RESPIRATION RATE: 18 BRPM | TEMPERATURE: 98 F | WEIGHT: 145 LBS | BODY MASS INDEX: 26.68 KG/M2 | HEIGHT: 62 IN | DIASTOLIC BLOOD PRESSURE: 53 MMHG

## 2024-07-05 DIAGNOSIS — Z77.098 CHEMICAL EXPOSURE: Primary | ICD-10-CM

## 2024-07-05 PROCEDURE — 99282 EMERGENCY DEPT VISIT SF MDM: CPT

## 2024-07-05 ASSESSMENT — ENCOUNTER SYMPTOMS
DIARRHEA: 0
COUGH: 0
NAUSEA: 0
ABDOMINAL PAIN: 0
VOMITING: 0
SHORTNESS OF BREATH: 0

## 2024-07-05 ASSESSMENT — PAIN - FUNCTIONAL ASSESSMENT: PAIN_FUNCTIONAL_ASSESSMENT: NONE - DENIES PAIN

## 2024-07-05 NOTE — ED PROVIDER NOTES
transmitted upper airway sounds. No decreased breath sounds, wheezing, rhonchi or rales.      Comments: Patient is speaking clearly in full sentences.  Saturating 97% on room air.  No respiratory distress.  Abdominal:      General: Bowel sounds are normal. There is no distension.      Palpations: Abdomen is soft. There is no mass.      Tenderness: There is no abdominal tenderness. There is no guarding or rebound.   Musculoskeletal:         General: Normal range of motion.      Cervical back: Normal range of motion. No rigidity.   Skin:     General: Skin is warm and dry.      Findings: No erythema or rash.   Neurological:      General: No focal deficit present.      Mental Status: She is alert and oriented to person, place, and time.      GCS: GCS eye subscore is 4. GCS verbal subscore is 5. GCS motor subscore is 6.      Deep Tendon Reflexes: Reflexes are normal and symmetric.   Psychiatric:         Mood and Affect: Mood normal.         Behavior: Behavior is cooperative.         Thought Content: Thought content normal.           DIAGNOSTIC RESULTS     RADIOLOGY:   Non-plain film images such as CT, Ultrasound and MRI are read by the radiologist. Plain radiographic images are visualized and preliminarilyinterpreted by Keyana Mehta PA-C with the below findings:  Read By Myself:      Interpretation per the Radiologist below, if available at the time of this note:    No orders to display       LABS:  Labs Reviewed - No data to display    All other labs were within normal range or not returnedas of this dictation.    EMERGENCYDEPARTMENT COURSE and DIFFERENTIAL DIAGNOSIS/MDM:   Vitals:    Vitals:    07/05/24 1333 07/05/24 1334   BP:  (!) 102/53   Pulse: 65    Resp: 18    Temp: 98 °F (36.7 °C)    TempSrc: Oral    SpO2: 97%    Weight: 65.8 kg (145 lb)    Height: 1.575 m (5' 2\")        REASSESSMENT        Patient presents to the emergency department today with chemical exposure yesterday. Patient's caretaker reports that

## 2024-07-05 NOTE — ED TRIAGE NOTES
Pt presents to ER from [facility] for a chemical exposure that occurred yesterday and per their protocol at the facility they need to bring them in to be seen. Pt at this time has not complaint of pain, N/V, headache. Pt is A&Ox2-3 which is baseline for. All vitals are stable at this time.

## 2024-07-29 ENCOUNTER — APPOINTMENT (OUTPATIENT)
Dept: PRIMARY CARE | Facility: CLINIC | Age: 59
End: 2024-07-29
Payer: MEDICARE

## 2024-09-26 DIAGNOSIS — E78.2 MODERATE MIXED HYPERLIPIDEMIA NOT REQUIRING STATIN THERAPY: ICD-10-CM

## 2024-09-26 DIAGNOSIS — D64.9 ANEMIA, UNSPECIFIED TYPE: ICD-10-CM

## 2024-09-26 DIAGNOSIS — E03.9 ACQUIRED HYPOTHYROIDISM: ICD-10-CM

## 2024-10-01 ENCOUNTER — APPOINTMENT (OUTPATIENT)
Dept: PRIMARY CARE | Facility: CLINIC | Age: 59
End: 2024-10-01
Payer: MEDICARE

## 2024-10-01 VITALS
SYSTOLIC BLOOD PRESSURE: 92 MMHG | OXYGEN SATURATION: 96 % | DIASTOLIC BLOOD PRESSURE: 65 MMHG | HEART RATE: 84 BPM | RESPIRATION RATE: 16 BRPM | TEMPERATURE: 97.5 F

## 2024-10-01 DIAGNOSIS — Z23 ENCOUNTER FOR IMMUNIZATION: ICD-10-CM

## 2024-10-01 DIAGNOSIS — E66.09 CLASS 1 OBESITY DUE TO EXCESS CALORIES WITH SERIOUS COMORBIDITY AND BODY MASS INDEX (BMI) OF 32.0 TO 32.9 IN ADULT: ICD-10-CM

## 2024-10-01 DIAGNOSIS — F42.2 MIXED OBSESSIONAL THOUGHTS AND ACTS: Primary | ICD-10-CM

## 2024-10-01 DIAGNOSIS — E66.811 CLASS 1 OBESITY DUE TO EXCESS CALORIES WITH SERIOUS COMORBIDITY AND BODY MASS INDEX (BMI) OF 32.0 TO 32.9 IN ADULT: ICD-10-CM

## 2024-10-01 PROCEDURE — 99213 OFFICE O/P EST LOW 20 MIN: CPT | Performed by: FAMILY MEDICINE

## 2024-10-01 PROCEDURE — G0008 ADMIN INFLUENZA VIRUS VAC: HCPCS | Performed by: FAMILY MEDICINE

## 2024-10-01 PROCEDURE — 90656 IIV3 VACC NO PRSV 0.5 ML IM: CPT | Performed by: FAMILY MEDICINE

## 2024-10-01 PROCEDURE — 1036F TOBACCO NON-USER: CPT | Performed by: FAMILY MEDICINE

## 2024-10-01 ASSESSMENT — ENCOUNTER SYMPTOMS
FLANK PAIN: 0
NECK STIFFNESS: 0
ABDOMINAL DISTENTION: 0
SPEECH DIFFICULTY: 0
FEVER: 0
SINUS PRESSURE: 0
CHEST TIGHTNESS: 0
FACIAL ASYMMETRY: 0
HEMATURIA: 0
POLYDIPSIA: 0
ARTHRALGIAS: 0
BLOOD IN STOOL: 0
WEAKNESS: 0
PHOTOPHOBIA: 0
ACTIVITY CHANGE: 0
EYE REDNESS: 0
TROUBLE SWALLOWING: 0
AGITATION: 0
FREQUENCY: 0
HALLUCINATIONS: 0
NECK PAIN: 0
CONFUSION: 0
EYE PAIN: 0
DYSURIA: 0
HEADACHES: 0
EYE ITCHING: 0
CHOKING: 0
COUGH: 0
LIGHT-HEADEDNESS: 0
UNEXPECTED WEIGHT CHANGE: 0
ADENOPATHY: 0
BRUISES/BLEEDS EASILY: 0
SLEEP DISTURBANCE: 0
BACK PAIN: 0
RHINORRHEA: 0
NUMBNESS: 0
CHILLS: 0
ABDOMINAL PAIN: 0
DYSPHORIC MOOD: 0
SORE THROAT: 0
WOUND: 0
JOINT SWELLING: 0
APPETITE CHANGE: 0
DIZZINESS: 0
EYE DISCHARGE: 0
NAUSEA: 0
VOICE CHANGE: 0
SEIZURES: 0
VOMITING: 0
WHEEZING: 0

## 2024-10-01 NOTE — PROGRESS NOTES
Subjective   Patient ID: Marci Mendiola is a 59 y.o. female who presents for Follow-up.    Patient comes to the office today mainly for immunization updates she needs her influenza vaccine.  POA has declined COVID-vaccine.  Main concern today is she is having frequent visits to the bathroom i.e. urination she did denies any urgency, dysuria, unusual odor or color of urine it seems to be more of a behavioral issue.       Review of Systems   Constitutional:  Negative for activity change, appetite change, chills, fever and unexpected weight change.   HENT:  Negative for congestion, ear pain, hearing loss, nosebleeds, postnasal drip, rhinorrhea, sinus pressure, sneezing, sore throat, tinnitus, trouble swallowing and voice change.    Eyes:  Negative for photophobia, pain, discharge, redness, itching and visual disturbance.   Respiratory:  Negative for cough, choking, chest tightness and wheezing.    Cardiovascular:  Negative for leg swelling.   Gastrointestinal:  Negative for abdominal distention, abdominal pain, blood in stool, nausea and vomiting.   Endocrine: Negative for polydipsia and polyuria.   Genitourinary:  Negative for dysuria, flank pain, frequency, hematuria and urgency.   Musculoskeletal:  Positive for gait problem. Negative for arthralgias, back pain, joint swelling, neck pain and neck stiffness.   Skin:  Negative for rash and wound.   Allergic/Immunologic: Negative for immunocompromised state.   Neurological:  Negative for dizziness, seizures, syncope, facial asymmetry, speech difficulty, weakness, light-headedness, numbness and headaches.   Hematological:  Negative for adenopathy. Does not bruise/bleed easily.   Psychiatric/Behavioral:  Negative for agitation, behavioral problems, confusion, dysphoric mood, hallucinations, self-injury, sleep disturbance and suicidal ideas.        Objective   BP 92/65 (BP Location: Right arm, Patient Position: Sitting, BP Cuff Size: Large adult)   Pulse 84   Temp 36.4  °C (97.5 °F) (Temporal)   Resp 16   SpO2 96%     Physical Exam  Constitutional:       General: She is not in acute distress.     Appearance: She is not ill-appearing or diaphoretic.   HENT:      Head: Normocephalic and atraumatic.      Right Ear: External ear normal.      Left Ear: External ear normal.      Nose: Nose normal. No rhinorrhea.   Eyes:      General: Lids are normal. No scleral icterus.        Right eye: No discharge.         Left eye: No discharge.      Conjunctiva/sclera: Conjunctivae normal.   Cardiovascular:      Rate and Rhythm: Normal rate and regular rhythm.      Pulses: Normal pulses.      Heart sounds: No murmur heard.  Pulmonary:      Effort: Pulmonary effort is normal. No respiratory distress.      Breath sounds: No decreased breath sounds, wheezing, rhonchi or rales.   Abdominal:      General: Bowel sounds are normal. There is no distension.      Palpations: Abdomen is soft. There is no mass.      Tenderness: There is no abdominal tenderness. There is no guarding or rebound.   Musculoskeletal:         General: No swelling, tenderness or deformity.      Cervical back: No rigidity or tenderness.      Right lower leg: No edema.      Left lower leg: No edema.   Lymphadenopathy:      Cervical: No cervical adenopathy.      Upper Body:      Right upper body: No supraclavicular adenopathy.      Left upper body: No supraclavicular adenopathy.   Skin:     General: Skin is warm and dry.      Coloration: Skin is not jaundiced or pale.      Findings: No erythema, lesion or rash.   Neurological:      General: No focal deficit present.      Mental Status: She is alert and oriented to person, place, and time.      Sensory: No sensory deficit.      Motor: No weakness or tremor.      Coordination: Coordination abnormal.      Gait: Gait abnormal.   Psychiatric:         Mood and Affect: Mood normal. Affect is not inappropriate.         Behavior: Behavior normal.       Assessment/Plan   Diagnoses and all orders  for this visit:  Mixed obsessional thoughts and acts  -     Follow Up In [facility] - PCP - Established  Encounter for immunization  -     Follow Up In [facility] - PCP - Established  -     Flu vaccine, trivalent, preservative free, age 6 months and greater (Fluarix/Fluzone/Flulaval)  -     diphth,pertus,acell,,tetanus (BoostRIX) 2.5-8-5 Lf-mcg-Lf/0.5mL injection; Inject 0.5 mL into the muscle 1 time for 1 dose.  -     zoster vaccine-recombinant adjuvanted (Shingrix) 50 mcg/0.5 mL vaccine; Inject 0.5 mL into the muscle 1 time for 1 dose.  -     respiratory syncytial virus, RSV, vaccine, adjuvanted, age 60y+ (Arexvy) 120 mcg/0.5 mL suspension for reconstitution; Inject 0.5 mL into the muscle 1 time for 1 dose.  Class 1 obesity due to excess calories with serious comorbidity and body mass index (BMI) of 32.0 to 32.9 in adult  -     Follow Up In [facility] - PCP - Established  In terms of underlying OCD I would recommend following up with psychiatry and discuss urine issues.  If she develops any alteration of symptomatology then we will repeat workup with UA and urine culture.  Offered COVID but declined.  Patient was identified as a fall risk. Risk prevention instructions provided.

## 2024-10-01 NOTE — PATIENT INSTRUCTIONS
Influenza vaccine given today.  Recommend tetanus, shingles and RSV at own convenience.  BMI was above normal measurement. Current weight: 0 kg () (w/c)  Weight change since last visit (-) denotes wt loss     Weight loss needed to achieve BMI 25:   Lbs  Weight loss needed to achieve BMI 30:   Lbs  Provided instructions on dietary changes  Provided instructions on exercise  Advised to Increase physical activity.    Ways to Help Prevent Falls at Home    Quick Tips   ? Ask for help if you need it. Most people want to help!   ? Get up slowly after sitting or laying down   ? Wear a medical alert device or keep cell phone in your pocket   ? Use night lights, especially areas near a bathroom   ? Keep the items you use often within reach on a small stool or end table   ? Use an assistive device such as walker or cane, as directed by provider/physical therapy   ? Use a non-slip mat and grab bars in your bathroom. Look for home health sections for best options     Other Areas to Focus On   ? Exercise and nutrition: Regular exercise or taking a falls prevention class are great ways improve strength and balance. Don’t forget to stay hydrated and bring a snack!   ? Medicine side effects: Some medicines can make you sleepy or dizzy, which could cause a fall. Ask your healthcare provider about the side effects your medicines could cause. Be sure to let them know if you take any vitamins or supplements as well.   ? Tripping hazards: Remove items you could trip on, such as loose mats, rugs, cords, and clutter. Wear closed toe shoes with rubber soles.   ? Health and wellness: Get regular checkups with your healthcare provider, plus routine vision and hearing screenings. Talk with your healthcare provider about:   o Your medicines and the possible side effects - bring them in a bag if that is easier!   o Problems with balance or feeling dizzy   o Ways to promote bone health, such as Vitamin D and calcium supplements   o Questions or  concerns about falling     *Ask your healthcare team if you have questions     ©[facility], 2022

## 2024-10-09 DIAGNOSIS — M51.369 DEGENERATION OF INTERVERTEBRAL DISC OF LUMBAR REGION, UNSPECIFIED WHETHER PAIN PRESENT: Chronic | ICD-10-CM

## 2024-10-09 DIAGNOSIS — R20.0 SENSORY LOSS: ICD-10-CM

## 2024-10-09 DIAGNOSIS — M62.81 MUSCLE WEAKNESS: ICD-10-CM

## 2024-10-09 DIAGNOSIS — F71 MODERATE INTELLECTUAL DISABILITIES: ICD-10-CM

## 2024-10-09 RX ORDER — CALCIUM CARBONATE 160(400)MG
TABLET,CHEWABLE ORAL
Qty: 1 EACH | Refills: 0 | Status: SHIPPED | OUTPATIENT
Start: 2024-10-09

## 2024-10-24 DIAGNOSIS — S42.212D CLOSED FRACTURE OF NECK OF LEFT HUMERUS WITH ROUTINE HEALING: Primary | ICD-10-CM

## 2024-10-24 RX ORDER — CALCIUM CARBONATE 500(1250)
1 TABLET ORAL DAILY
Qty: 30 TABLET | Refills: 0 | Status: SHIPPED | OUTPATIENT
Start: 2024-10-24

## 2024-11-06 DIAGNOSIS — M81.0 OSTEOPOROSIS OF MULTIPLE SITES: ICD-10-CM

## 2024-11-06 RX ORDER — DENOSUMAB 60 MG/ML
INJECTION SUBCUTANEOUS
Qty: 1 EACH | Refills: 1 | Status: SHIPPED | OUTPATIENT
Start: 2024-11-06

## 2024-11-07 ENCOUNTER — APPOINTMENT (OUTPATIENT)
Dept: RADIOLOGY | Facility: HOSPITAL | Age: 59
End: 2024-11-07
Payer: MEDICARE

## 2024-11-18 DIAGNOSIS — S42.212D CLOSED FRACTURE OF NECK OF LEFT HUMERUS WITH ROUTINE HEALING: ICD-10-CM

## 2024-11-18 RX ORDER — CALCIUM CARBONATE 500(1250)
1 TABLET ORAL DAILY
Qty: 30 TABLET | Refills: 0 | Status: SHIPPED | OUTPATIENT
Start: 2024-11-18

## 2024-12-18 ENCOUNTER — LAB (OUTPATIENT)
Dept: LAB | Facility: LAB | Age: 59
End: 2024-12-18
Payer: MEDICARE

## 2024-12-18 DIAGNOSIS — S42.212D CLOSED FRACTURE OF NECK OF LEFT HUMERUS WITH ROUTINE HEALING: ICD-10-CM

## 2024-12-18 DIAGNOSIS — G40.909 EPILEPSY, UNSPECIFIED, NOT INTRACTABLE, WITHOUT STATUS EPILEPTICUS: Primary | ICD-10-CM

## 2024-12-18 LAB — TOPIRAMATE SERPL-MCNC: 17.4 UG/ML (ref 2–25)

## 2024-12-18 PROCEDURE — 36415 COLL VENOUS BLD VENIPUNCTURE: CPT

## 2024-12-18 PROCEDURE — 80299 QUANTITATIVE ASSAY DRUG: CPT

## 2024-12-18 PROCEDURE — 80235 DRUG ASSAY LACOSAMIDE: CPT

## 2024-12-18 PROCEDURE — 80201 ASSAY OF TOPIRAMATE: CPT

## 2024-12-18 RX ORDER — CALCIUM CARBONATE 500(1250)
1 TABLET ORAL DAILY
Qty: 90 TABLET | Refills: 1 | Status: SHIPPED | OUTPATIENT
Start: 2024-12-18

## 2024-12-20 LAB — LACOSAMIDE SERPL-MCNC: 3.1 UG/ML (ref 1–10)

## 2024-12-23 LAB
CLOBAZAM SERPL-MCNC: 15 NG/ML (ref 30–300)
NORCLOBAZAM SERPL-MCNC: 132 NG/ML (ref 300–3000)

## 2025-01-01 ENCOUNTER — APPOINTMENT (OUTPATIENT)
Dept: GENERAL RADIOLOGY | Age: 60
End: 2025-01-01
Payer: MEDICARE

## 2025-01-01 ENCOUNTER — HOSPITAL ENCOUNTER (EMERGENCY)
Age: 60
Discharge: HOME | End: 2025-01-01
Payer: MEDICARE

## 2025-01-01 VITALS
HEIGHT: 62 IN | BODY MASS INDEX: 27.6 KG/M2 | TEMPERATURE: 98.7 F | SYSTOLIC BLOOD PRESSURE: 108 MMHG | OXYGEN SATURATION: 96 % | WEIGHT: 150 LBS | HEART RATE: 92 BPM | RESPIRATION RATE: 19 BRPM | DIASTOLIC BLOOD PRESSURE: 59 MMHG

## 2025-01-01 DIAGNOSIS — G40.919 BREAKTHROUGH SEIZURE (HCC): Primary | ICD-10-CM

## 2025-01-01 LAB
ALBUMIN SERPL-MCNC: 4.2 G/DL (ref 3.5–4.6)
ALP SERPL-CCNC: 105 U/L (ref 40–130)
ALT SERPL-CCNC: 14 U/L (ref 0–33)
ANION GAP SERPL CALCULATED.3IONS-SCNC: 14 MEQ/L (ref 9–15)
AST SERPL-CCNC: 12 U/L (ref 0–35)
BASOPHILS # BLD: 0 K/UL (ref 0–0.2)
BASOPHILS # BLD: 0 K/UL (ref 0–0.2)
BASOPHILS NFR BLD: 0.3 %
BASOPHILS NFR BLD: 0.3 %
BILIRUB SERPL-MCNC: <0.2 MG/DL (ref 0.2–0.7)
BILIRUB UR QL STRIP: NEGATIVE
BUN SERPL-MCNC: 11 MG/DL (ref 8–23)
CALCIUM SERPL-MCNC: 8.3 MG/DL (ref 8.5–9.9)
CHLORIDE SERPL-SCNC: 112 MEQ/L (ref 95–107)
CK SERPL-CCNC: 60 U/L (ref 0–170)
CLARITY UR: ABNORMAL
CO2 SERPL-SCNC: 16 MEQ/L (ref 20–31)
COLOR UR: YELLOW
CREAT SERPL-MCNC: 0.67 MG/DL (ref 0.5–0.9)
EOSINOPHIL # BLD: 0 K/UL (ref 0–0.7)
EOSINOPHIL # BLD: 0 K/UL (ref 0–0.7)
EOSINOPHIL NFR BLD: 0.2 %
EOSINOPHIL NFR BLD: 0.2 %
ERYTHROCYTE [DISTWIDTH] IN BLOOD BY AUTOMATED COUNT: 12.8 % (ref 11.5–14.5)
ERYTHROCYTE [DISTWIDTH] IN BLOOD BY AUTOMATED COUNT: 12.9 % (ref 11.5–14.5)
GLOBULIN SER CALC-MCNC: 2.7 G/DL (ref 2.3–3.5)
GLUCOSE SERPL-MCNC: 109 MG/DL (ref 70–99)
GLUCOSE UR STRIP-MCNC: NEGATIVE MG/DL
HCT VFR BLD AUTO: 34.8 % (ref 37–47)
HCT VFR BLD AUTO: 36.6 % (ref 37–47)
HGB BLD-MCNC: 12.1 G/DL (ref 12–16)
HGB BLD-MCNC: 13 G/DL (ref 12–16)
HGB UR QL STRIP: NEGATIVE
KETONES UR STRIP-MCNC: NEGATIVE MG/DL
LEUKOCYTE ESTERASE UR QL STRIP: NEGATIVE
LYMPHOCYTES # BLD: 0.5 K/UL (ref 1–4.8)
LYMPHOCYTES # BLD: 0.7 K/UL (ref 1–4.8)
LYMPHOCYTES NFR BLD: 3.8 %
LYMPHOCYTES NFR BLD: 5 %
MCH RBC QN AUTO: 33.6 PG (ref 27–31.3)
MCH RBC QN AUTO: 33.7 PG (ref 27–31.3)
MCHC RBC AUTO-ENTMCNC: 34.8 % (ref 33–37)
MCHC RBC AUTO-ENTMCNC: 35.5 % (ref 33–37)
MCV RBC AUTO: 94.8 FL (ref 79.4–94.8)
MCV RBC AUTO: 96.7 FL (ref 79.4–94.8)
MONOCYTES # BLD: 0.8 K/UL (ref 0.2–0.8)
MONOCYTES # BLD: 0.8 K/UL (ref 0.2–0.8)
MONOCYTES NFR BLD: 5.8 %
MONOCYTES NFR BLD: 6.1 %
NEUTROPHILS # BLD: 11.9 K/UL (ref 1.4–6.5)
NEUTROPHILS # BLD: 12.3 K/UL (ref 1.4–6.5)
NEUTS SEG NFR BLD: 88.1 %
NEUTS SEG NFR BLD: 89 %
NITRITE UR QL STRIP: NEGATIVE
PH UR STRIP: 7.5 [PH] (ref 5–9)
PLATELET # BLD AUTO: 221 K/UL (ref 130–400)
PLATELET # BLD AUTO: 223 K/UL (ref 130–400)
POTASSIUM SERPL-SCNC: 3.5 MEQ/L (ref 3.4–4.9)
PROT SERPL-MCNC: 6.9 G/DL (ref 6.3–8)
PROT UR STRIP-MCNC: ABNORMAL MG/DL
RBC # BLD AUTO: 3.6 M/UL (ref 4.2–5.4)
RBC # BLD AUTO: 3.86 M/UL (ref 4.2–5.4)
REASON FOR REJECTION: NORMAL
REJECTED TEST: NORMAL
SODIUM SERPL-SCNC: 142 MEQ/L (ref 135–144)
SP GR UR STRIP: 1.02 (ref 1–1.03)
TSH SERPL-MCNC: 0.62 UIU/ML (ref 0.44–3.86)
URINE REFLEX TO CULTURE: ABNORMAL
UROBILINOGEN UR STRIP-ACNC: 0.2 E.U./DL
WBC # BLD AUTO: 13.3 K/UL (ref 4.8–10.8)
WBC # BLD AUTO: 13.9 K/UL (ref 4.8–10.8)

## 2025-01-01 PROCEDURE — 71045 X-RAY EXAM CHEST 1 VIEW: CPT

## 2025-01-01 PROCEDURE — 80053 COMPREHEN METABOLIC PANEL: CPT

## 2025-01-01 PROCEDURE — 80299 QUANTITATIVE ASSAY DRUG: CPT

## 2025-01-01 PROCEDURE — 82550 ASSAY OF CK (CPK): CPT

## 2025-01-01 PROCEDURE — 80177 DRUG SCRN QUAN LEVETIRACETAM: CPT

## 2025-01-01 PROCEDURE — 99285 EMERGENCY DEPT VISIT HI MDM: CPT

## 2025-01-01 PROCEDURE — 36415 COLL VENOUS BLD VENIPUNCTURE: CPT

## 2025-01-01 PROCEDURE — 85025 COMPLETE CBC W/AUTO DIFF WBC: CPT

## 2025-01-01 PROCEDURE — 6370000000 HC RX 637 (ALT 250 FOR IP): Performed by: PHYSICIAN ASSISTANT

## 2025-01-01 PROCEDURE — 81003 URINALYSIS AUTO W/O SCOPE: CPT

## 2025-01-01 PROCEDURE — 84443 ASSAY THYROID STIM HORMONE: CPT

## 2025-01-01 PROCEDURE — 93005 ELECTROCARDIOGRAM TRACING: CPT | Performed by: PHYSICIAN ASSISTANT

## 2025-01-01 RX ORDER — ACETAMINOPHEN 500 MG
1000 TABLET ORAL ONCE
Status: COMPLETED | OUTPATIENT
Start: 2025-01-01 | End: 2025-01-01

## 2025-01-01 RX ADMIN — ACETAMINOPHEN 1000 MG: 500 TABLET ORAL at 10:55

## 2025-01-01 ASSESSMENT — ENCOUNTER SYMPTOMS
ABDOMINAL PAIN: 0
SORE THROAT: 0
COUGH: 1
COLOR CHANGE: 0
CONSTIPATION: 0
SHORTNESS OF BREATH: 0
RHINORRHEA: 0
EYE DISCHARGE: 0
ABDOMINAL DISTENTION: 0

## 2025-01-01 ASSESSMENT — PAIN DESCRIPTION - LOCATION
LOCATION: HEAD
LOCATION: BACK

## 2025-01-01 ASSESSMENT — PAIN - FUNCTIONAL ASSESSMENT: PAIN_FUNCTIONAL_ASSESSMENT: 0-10

## 2025-01-01 ASSESSMENT — PAIN DESCRIPTION - PAIN TYPE: TYPE: ACUTE PAIN

## 2025-01-01 NOTE — ED TRIAGE NOTES
The patient was brought to the ED by EMS. Per EMS the patient had a seizure that was witnessed by [facility] staff that lasted approximately 30-45second. Pt is A&Ox3 but appears drowsy. Respirations even and unlabored.

## 2025-01-01 NOTE — ED NOTES
Andrea EPPERSON at bedside with explanation of results and discharge instruction.  Discharge education reviewed.  Patient and caregiver at bedside were instructed to follow up with PCP and come back to the ED with any new or worsening symptoms.   No questions or concerns at this time.

## 2025-01-01 NOTE — ED PROVIDER NOTES
[facility] ED  EMERGENCY DEPARTMENT ENCOUNTER      Pt Name: Palmira Madrigal  MRN: [id number]  Birthdate 1965  Date of evaluation: 1/1/2025  Provider: Leonard Cutler PA-C  7:54 AM EST    CHIEF COMPLAINT       Chief Complaint   Patient presents with    Seizures     Witnessed seizure by [facility] staff lasting 30-45 seconds         HISTORY OF PRESENT ILLNESS   (Location/Symptom, Timing/Onset, Context/Setting, Quality, Duration, Modifying Factors, Severity)  Note limiting factors.   Palmira Madrigal is a 59 y.o. female who presents to the emergency department with a complaint of witnessed seizure by staff at [facility].  Patient has known history of epilepsy, she is on antiseizure medications, patient states she does have seizures frequently, she states her last seizure was 2 days ago.  According to staff, patient had a witnessed seizure lasting 30 to 45 seconds in duration, states it was grand mal, shaking of entire body, by the time EMS arrived, patient alert and oriented to her norm.  Patient states she has had a cough which is productive for the last couple of days denies any fevers, no chest pain shortness of breath nausea vomiting or diarrhea.  Patient denies any other complaints at this time.  There was no fall or injury according to staff at facility, staff was present at time of seizure, patient lowered to the ground.  Past medical history significant for seizures, MR, arthritis, hypothyroidism.    HPI    Nursing Notes were reviewed.    REVIEW OF SYSTEMS    (2-9 systems for level 4, 10 or more for level 5)     Review of Systems   Constitutional:  Negative for activity change and appetite change.   HENT:  Negative for congestion, ear discharge, ear pain, nosebleeds, rhinorrhea and sore throat.    Eyes:  Negative for discharge.   Respiratory:  Positive for cough. Negative for shortness of breath.    Cardiovascular:  Negative for chest pain, palpitations and leg swelling.

## 2025-01-03 ENCOUNTER — APPOINTMENT (OUTPATIENT)
Dept: RADIOLOGY | Facility: HOSPITAL | Age: 60
End: 2025-01-03
Payer: MEDICARE

## 2025-01-03 LAB
EKG ATRIAL RATE: 103 BPM
EKG P AXIS: 52 DEGREES
EKG P-R INTERVAL: 136 MS
EKG Q-T INTERVAL: 324 MS
EKG QRS DURATION: 78 MS
EKG QTC CALCULATION (BAZETT): 424 MS
EKG R AXIS: 52 DEGREES
EKG T AXIS: 48 DEGREES
EKG VENTRICULAR RATE: 103 BPM

## 2025-01-04 LAB
LACOSAMIDE SERPL-MCNC: 2.4 UG/ML (ref 1–10)
LEVETIRACETAM SERPL-MCNC: 39 UG/ML (ref 10–40)

## 2025-01-16 ENCOUNTER — APPOINTMENT (OUTPATIENT)
Dept: PRIMARY CARE | Facility: CLINIC | Age: 60
End: 2025-01-16
Payer: MEDICARE

## 2025-01-28 ENCOUNTER — APPOINTMENT (OUTPATIENT)
Dept: PRIMARY CARE | Facility: CLINIC | Age: 60
End: 2025-01-28
Payer: MEDICARE

## 2025-01-28 VITALS
SYSTOLIC BLOOD PRESSURE: 92 MMHG | DIASTOLIC BLOOD PRESSURE: 64 MMHG | RESPIRATION RATE: 16 BRPM | HEART RATE: 71 BPM | BODY MASS INDEX: 32.69 KG/M2 | TEMPERATURE: 97.6 F | HEIGHT: 61 IN | OXYGEN SATURATION: 96 %

## 2025-01-28 DIAGNOSIS — E83.51 HYPOCALCEMIA: ICD-10-CM

## 2025-01-28 DIAGNOSIS — S42.212D CLOSED FRACTURE OF NECK OF LEFT HUMERUS WITH ROUTINE HEALING: ICD-10-CM

## 2025-01-28 DIAGNOSIS — G40.909 SEIZURE DISORDER (MULTI): Primary | ICD-10-CM

## 2025-01-28 DIAGNOSIS — D72.829 LEUKOCYTOSIS, UNSPECIFIED TYPE: ICD-10-CM

## 2025-01-28 PROCEDURE — 99213 OFFICE O/P EST LOW 20 MIN: CPT | Performed by: FAMILY MEDICINE

## 2025-01-28 PROCEDURE — 1036F TOBACCO NON-USER: CPT | Performed by: FAMILY MEDICINE

## 2025-01-28 RX ORDER — CALCIUM CARBONATE 500(1250)
1 TABLET ORAL DAILY
Qty: 30 TABLET | Refills: 0 | Status: SHIPPED | OUTPATIENT
Start: 2025-01-28

## 2025-01-28 ASSESSMENT — ENCOUNTER SYMPTOMS
APPETITE CHANGE: 0
ABDOMINAL PAIN: 0
AGITATION: 0
NECK STIFFNESS: 0
FEVER: 0
CONFUSION: 0
HEMATURIA: 0
ADENOPATHY: 0
NECK PAIN: 0
HEADACHES: 0
ABDOMINAL DISTENTION: 0
TROUBLE SWALLOWING: 0
LOSS OF CONSCIOUSNESS: 1
WOUND: 0
BRUISES/BLEEDS EASILY: 0
ARTHRALGIAS: 0
DYSPHORIC MOOD: 0
SEIZURES: 1
SINUS PRESSURE: 0
POLYDIPSIA: 0
RHYTHMIC JERKING: 1
FACIAL ASYMMETRY: 0
FREQUENCY: 0
UNEXPECTED WEIGHT CHANGE: 0
DIZZINESS: 0
JOINT SWELLING: 0
EYE ITCHING: 0
EYE DISCHARGE: 0
BLOOD IN STOOL: 0
VOICE CHANGE: 0
EYE PAIN: 0
WHEEZING: 0
WEAKNESS: 0
NAUSEA: 0
SORE THROAT: 0
BACK PAIN: 0
PHOTOPHOBIA: 0
CHOKING: 0
FLANK PAIN: 0
CHEST TIGHTNESS: 0
EYE REDNESS: 0
SPEECH DIFFICULTY: 0
COUGH: 0
NUMBNESS: 0
CHILLS: 0
HALLUCINATIONS: 0
ACTIVITY CHANGE: 0
RHINORRHEA: 0
SLEEP DISTURBANCE: 0
DYSURIA: 0
VOMITING: 0
LIGHT-HEADEDNESS: 0

## 2025-01-28 NOTE — PROGRESS NOTES
Subjective   Patient ID: Marci Mendiola is a 59 y.o. female who presents for ER Follow-up ([facility] ER 1/12025/DX: Breakthrough seizure).    Seizures   This is a recurrent problem. The current episode started more than 1 week ago. The problem has been resolved. There was 1 seizure. The most recent episode lasted 30 to 120 seconds. Pertinent negatives include no confusion, no headaches, no speech difficulty, no visual disturbance, no sore throat, no cough, no nausea and no vomiting. Characteristics include rhythmic jerking and loss of consciousness. The episode was Witnessed. There was No sensation of an aura present. The seizures Did not continue in the ED. The seizure(s) had no focality. Possible causes do not include sleep deprivation. There has been no fever.        Review of Systems   Constitutional:  Negative for activity change, appetite change, chills, fever and unexpected weight change.   HENT:  Negative for congestion, ear pain, hearing loss, nosebleeds, postnasal drip, rhinorrhea, sinus pressure, sneezing, sore throat, tinnitus, trouble swallowing and voice change.    Eyes:  Negative for photophobia, pain, discharge, redness, itching and visual disturbance.   Respiratory:  Negative for cough, choking, chest tightness and wheezing.    Cardiovascular:  Negative for leg swelling.   Gastrointestinal:  Negative for abdominal distention, abdominal pain, blood in stool, nausea and vomiting.   Endocrine: Negative for polydipsia and polyuria.   Genitourinary:  Negative for dysuria, flank pain, frequency, hematuria and urgency.   Musculoskeletal:  Positive for gait problem. Negative for arthralgias, back pain, joint swelling, neck pain and neck stiffness.   Skin:  Negative for rash and wound.   Allergic/Immunologic: Negative for immunocompromised state.   Neurological:  Positive for seizures and loss of consciousness. Negative for dizziness, syncope, facial asymmetry, speech difficulty, weakness, light-headedness,  "numbness and headaches.   Hematological:  Negative for adenopathy. Does not bruise/bleed easily.   Psychiatric/Behavioral:  Negative for agitation, behavioral problems, confusion, dysphoric mood, hallucinations, self-injury, sleep disturbance and suicidal ideas.        Objective   BP 92/64 (BP Location: Left arm, Patient Position: Sitting, BP Cuff Size: Large adult)   Pulse 71   Temp 36.4 °C (97.6 °F) (Temporal)   Resp 16   Ht 1.549 m (5' 1\")   SpO2 96%   BMI 32.69 kg/m²     Physical Exam  Constitutional:       General: She is not in acute distress.     Appearance: She is not ill-appearing or diaphoretic.   HENT:      Head: Normocephalic and atraumatic.      Right Ear: External ear normal.      Left Ear: External ear normal.      Nose: Nose normal. No rhinorrhea.   Eyes:      General: Lids are normal. No scleral icterus.        Right eye: No discharge.         Left eye: No discharge.      Conjunctiva/sclera: Conjunctivae normal.   Cardiovascular:      Rate and Rhythm: Normal rate and regular rhythm.      Pulses: Normal pulses.      Heart sounds: No murmur heard.  Pulmonary:      Effort: Pulmonary effort is normal. No respiratory distress.      Breath sounds: No decreased breath sounds, wheezing, rhonchi or rales.   Abdominal:      General: Bowel sounds are normal. There is no distension.      Palpations: Abdomen is soft. There is no mass.      Tenderness: There is no abdominal tenderness. There is no guarding or rebound.   Musculoskeletal:         General: No swelling, tenderness or deformity.      Cervical back: No rigidity or tenderness.      Right lower leg: No edema.      Left lower leg: No edema.   Lymphadenopathy:      Cervical: No cervical adenopathy.      Upper Body:      Right upper body: No supraclavicular adenopathy.      Left upper body: No supraclavicular adenopathy.   Skin:     General: Skin is warm and dry.      Coloration: Skin is not jaundiced or pale.      Findings: No erythema, lesion or " rash.   Neurological:      General: No focal deficit present.      Mental Status: She is alert and oriented to person, place, and time.      Sensory: No sensory deficit.      Motor: No weakness or tremor.      Coordination: Coordination abnormal.      Gait: Gait abnormal.   Psychiatric:         Mood and Affect: Mood normal. Affect is not inappropriate.         Behavior: Behavior normal.         Assessment/Plan   Diagnoses and all orders for this visit:  Seizure disorder (Multi)  -     Vitamin D 25-Hydroxy,Total (for eval of Vitamin D levels); Future  -     C-Reactive Protein; Future  -     Sedimentation Rate; Future  -     Follow Up In [facility] - PCP - Established; Future  Leukocytosis, unspecified type  -     C-Reactive Protein; Future  -     Sedimentation Rate; Future  -     Follow Up In [facility] - PCP - Established; Future  Hypocalcemia  -     Vitamin D 25-Hydroxy,Total (for eval of Vitamin D levels); Future  -     Follow Up In [facility] - PCP - Established; Future    Maintain current meds as before continue to follow with neurology as recommended.  Upon reviewing labs from ER of note there was hypocalcemia and elevated white count we will repeat those labs prior to next visit along with a vitamin D level.  Return immediately if symptoms worsen at any time otherwise we will revisit labs in 1 month.

## 2025-02-17 DIAGNOSIS — S42.212D CLOSED FRACTURE OF NECK OF LEFT HUMERUS WITH ROUTINE HEALING: ICD-10-CM

## 2025-02-18 RX ORDER — CALCIUM CARBONATE 500(1250)
1 TABLET ORAL DAILY
Qty: 30 TABLET | Refills: 3 | Status: SHIPPED | OUTPATIENT
Start: 2025-02-18

## 2025-02-27 ENCOUNTER — APPOINTMENT (OUTPATIENT)
Dept: PRIMARY CARE | Facility: CLINIC | Age: 60
End: 2025-02-27
Payer: MEDICARE

## 2025-02-27 DIAGNOSIS — G40.909 SEIZURE DISORDER (MULTI): ICD-10-CM

## 2025-02-27 DIAGNOSIS — E83.51 HYPOCALCEMIA: ICD-10-CM

## 2025-02-27 DIAGNOSIS — D72.829 LEUKOCYTOSIS, UNSPECIFIED TYPE: ICD-10-CM

## 2025-03-08 LAB
25(OH)D3+25(OH)D2 SERPL-MCNC: 31 NG/ML (ref 30–100)
ALBUMIN SERPL-MCNC: 4 G/DL (ref 3.6–5.1)
ALP SERPL-CCNC: 92 U/L (ref 37–153)
ALT SERPL-CCNC: 27 U/L (ref 6–29)
ANION GAP SERPL CALCULATED.4IONS-SCNC: 9 MMOL/L (CALC) (ref 7–17)
AST SERPL-CCNC: 17 U/L (ref 10–35)
BASOPHILS # BLD AUTO: 123 CELLS/UL (ref 0–200)
BASOPHILS NFR BLD AUTO: 2.2 %
BILIRUB SERPL-MCNC: 0.3 MG/DL (ref 0.2–1.2)
BUN SERPL-MCNC: 13 MG/DL (ref 7–25)
CALCIUM SERPL-MCNC: 8.8 MG/DL (ref 8.6–10.4)
CHLORIDE SERPL-SCNC: 111 MMOL/L (ref 98–110)
CHOLEST SERPL-MCNC: 212 MG/DL
CHOLEST/HDLC SERPL: 3.2 (CALC)
CO2 SERPL-SCNC: 23 MMOL/L (ref 20–32)
CREAT SERPL-MCNC: 0.81 MG/DL (ref 0.5–1.05)
CRP SERPL-MCNC: NORMAL MG/L
EGFRCR SERPLBLD CKD-EPI 2021: 83 ML/MIN/1.73M2
EOSINOPHIL # BLD AUTO: 767 CELLS/UL (ref 15–500)
EOSINOPHIL NFR BLD AUTO: 13.7 %
ERYTHROCYTE [DISTWIDTH] IN BLOOD BY AUTOMATED COUNT: 13.2 % (ref 11–15)
ERYTHROCYTE [SEDIMENTATION RATE] IN BLOOD BY WESTERGREN METHOD: 11 MM/H
GLUCOSE SERPL-MCNC: 103 MG/DL (ref 65–99)
HCT VFR BLD AUTO: 35.9 % (ref 35–45)
HDLC SERPL-MCNC: 67 MG/DL
HGB BLD-MCNC: 12 G/DL (ref 11.7–15.5)
LDLC SERPL CALC-MCNC: 117 MG/DL (CALC)
LYMPHOCYTES # BLD AUTO: 1098 CELLS/UL (ref 850–3900)
LYMPHOCYTES NFR BLD AUTO: 19.6 %
MAGNESIUM SERPL-MCNC: 2.3 MG/DL (ref 1.5–2.5)
MCH RBC QN AUTO: 32.5 PG (ref 27–33)
MCHC RBC AUTO-ENTMCNC: 33.4 G/DL (ref 32–36)
MCV RBC AUTO: 97.3 FL (ref 80–100)
MONOCYTES # BLD AUTO: 476 CELLS/UL (ref 200–950)
MONOCYTES NFR BLD AUTO: 8.5 %
NEUTROPHILS # BLD AUTO: 3136 CELLS/UL (ref 1500–7800)
NEUTROPHILS NFR BLD AUTO: 56 %
NONHDLC SERPL-MCNC: 145 MG/DL (CALC)
PLATELET # BLD AUTO: 254 THOUSAND/UL (ref 140–400)
PMV BLD REES-ECKER: 10.3 FL (ref 7.5–12.5)
POTASSIUM SERPL-SCNC: 3.7 MMOL/L (ref 3.5–5.3)
PROT SERPL-MCNC: 6.8 G/DL (ref 6.1–8.1)
RBC # BLD AUTO: 3.69 MILLION/UL (ref 3.8–5.1)
SODIUM SERPL-SCNC: 143 MMOL/L (ref 135–146)
TRIGL SERPL-MCNC: 162 MG/DL
TSH SERPL-ACNC: 1.48 MIU/L (ref 0.4–4.5)
WBC # BLD AUTO: 5.6 THOUSAND/UL (ref 3.8–10.8)

## 2025-03-10 LAB
25(OH)D3+25(OH)D2 SERPL-MCNC: 31 NG/ML (ref 30–100)
CRP SERPL-MCNC: 5.4 MG/L
ERYTHROCYTE [SEDIMENTATION RATE] IN BLOOD BY WESTERGREN METHOD: 11 MM/H

## 2025-03-19 ENCOUNTER — APPOINTMENT (OUTPATIENT)
Dept: PRIMARY CARE | Facility: CLINIC | Age: 60
End: 2025-03-19
Payer: MEDICARE

## 2025-03-19 VITALS
HEART RATE: 67 BPM | TEMPERATURE: 97.5 F | DIASTOLIC BLOOD PRESSURE: 70 MMHG | OXYGEN SATURATION: 97 % | WEIGHT: 153.1 LBS | SYSTOLIC BLOOD PRESSURE: 100 MMHG | BODY MASS INDEX: 28.9 KG/M2 | RESPIRATION RATE: 16 BRPM | HEIGHT: 61 IN

## 2025-03-19 DIAGNOSIS — K21.9 GASTROESOPHAGEAL REFLUX DISEASE, UNSPECIFIED WHETHER ESOPHAGITIS PRESENT: ICD-10-CM

## 2025-03-19 DIAGNOSIS — E66.3 OVERWEIGHT WITH BODY MASS INDEX (BMI) OF 28 TO 28.9 IN ADULT: ICD-10-CM

## 2025-03-19 DIAGNOSIS — E55.9 VITAMIN D DEFICIENCY: ICD-10-CM

## 2025-03-19 DIAGNOSIS — E03.9 ACQUIRED HYPOTHYROIDISM: Primary | ICD-10-CM

## 2025-03-19 DIAGNOSIS — E78.2 MODERATE MIXED HYPERLIPIDEMIA NOT REQUIRING STATIN THERAPY: ICD-10-CM

## 2025-03-19 DIAGNOSIS — J30.9 ALLERGIC RHINITIS, UNSPECIFIED SEASONALITY, UNSPECIFIED TRIGGER: ICD-10-CM

## 2025-03-19 DIAGNOSIS — F25.1 SCHIZOAFFECTIVE DISORDER, DEPRESSIVE TYPE (MULTI): ICD-10-CM

## 2025-03-19 PROBLEM — S42.212D: Status: RESOLVED | Noted: 2023-03-17 | Resolved: 2025-03-19

## 2025-03-19 PROBLEM — S93.402D SPRAIN OF UNSPECIFIED LIGAMENT OF LEFT ANKLE, SUBSEQUENT ENCOUNTER: Status: RESOLVED | Noted: 2023-03-17 | Resolved: 2025-03-19

## 2025-03-19 PROBLEM — G47.9 SLEEP DISTURBANCE: Chronic | Status: RESOLVED | Noted: 2023-07-11 | Resolved: 2025-03-19

## 2025-03-19 PROBLEM — F32.A DEPRESSION, UNSPECIFIED: Status: RESOLVED | Noted: 2023-01-03 | Resolved: 2025-03-19

## 2025-03-19 PROBLEM — R79.89 ELEVATED LFTS: Status: RESOLVED | Noted: 2023-03-17 | Resolved: 2025-03-19

## 2025-03-19 PROBLEM — M15.0 PRIMARY OSTEOARTHRITIS INVOLVING MULTIPLE JOINTS: Status: ACTIVE | Noted: 2023-03-17

## 2025-03-19 PROBLEM — M25.612 DECREASED ROM OF LEFT SHOULDER: Status: RESOLVED | Noted: 2023-03-30 | Resolved: 2025-03-19

## 2025-03-19 PROBLEM — D64.9 ANEMIA: Status: RESOLVED | Noted: 2023-03-17 | Resolved: 2025-03-19

## 2025-03-19 PROBLEM — R41.89 COGNITIVE DECLINE: Chronic | Status: RESOLVED | Noted: 2023-07-11 | Resolved: 2025-03-19

## 2025-03-19 PROCEDURE — 99214 OFFICE O/P EST MOD 30 MIN: CPT | Performed by: FAMILY MEDICINE

## 2025-03-19 PROCEDURE — 1036F TOBACCO NON-USER: CPT | Performed by: FAMILY MEDICINE

## 2025-03-19 PROCEDURE — 3008F BODY MASS INDEX DOCD: CPT | Performed by: FAMILY MEDICINE

## 2025-03-19 RX ORDER — FAMOTIDINE 40 MG/1
40 TABLET, FILM COATED ORAL NIGHTLY
Qty: 30 TABLET | Refills: 11 | Status: SHIPPED | OUTPATIENT
Start: 2025-03-19 | End: 2026-03-19

## 2025-03-19 ASSESSMENT — ENCOUNTER SYMPTOMS
SLEEP DISTURBANCE: 0
BRUISES/BLEEDS EASILY: 0
VOICE CHANGE: 0
ABDOMINAL DISTENTION: 0
WOUND: 0
CHOKING: 0
WEAKNESS: 0
LIGHT-HEADEDNESS: 0
ACTIVITY CHANGE: 0
SEIZURES: 1
AGITATION: 0
ARTHRALGIAS: 0
LOSS OF CONSCIOUSNESS: 1
ABDOMINAL PAIN: 1
SORE THROAT: 0
DIZZINESS: 0
APPETITE CHANGE: 0
HALLUCINATIONS: 0
VOMITING: 0
EYE ITCHING: 0
FATIGUE: 1
JOINT SWELLING: 0
SPEECH DIFFICULTY: 0
EYE DISCHARGE: 0
SINUS PRESSURE: 0
CONSTIPATION: 0
DIARRHEA: 0
COUGH: 1
CHILLS: 0
NECK PAIN: 0
NUMBNESS: 0
BACK PAIN: 0
NAUSEA: 0
FEVER: 0
FLANK PAIN: 0
HEMATURIA: 0
SHORTNESS OF BREATH: 0
EYE PAIN: 0
CHEST TIGHTNESS: 0
DYSPHORIC MOOD: 0
ADENOPATHY: 0
HEADACHES: 0
CONFUSION: 0
FREQUENCY: 0
POLYDIPSIA: 0
EYE REDNESS: 0
UNEXPECTED WEIGHT CHANGE: 0
NECK STIFFNESS: 0
DYSURIA: 0
WHEEZING: 0
FACIAL ASYMMETRY: 0
TROUBLE SWALLOWING: 0
RHYTHMIC JERKING: 1
PHOTOPHOBIA: 0
BLOOD IN STOOL: 0
RHINORRHEA: 0

## 2025-03-19 NOTE — PROGRESS NOTES
Subjective   Patient ID: Marci Mendiola is a 59 y.o. female who presents for Follow-up (Review labs ), Fatigue (Pt states she is always tired. ), and Abdominal Pain (Couple weeks last bowel movement yesterday. Pain (achy) comes and goes. ).    Patient states she has been fatigued for a couple weeks. Endorses watery eyes, coughing, diffuse achy abdominal pain worse with eating. Denies fevers, sleep issues, changes in PO intake, nausea/vomiting, diarrhea, constipation, changes in urination.     Seizures   This is a recurrent problem. The current episode started more than 1 week ago. The problem has been resolved. There was 1 seizure. The most recent episode lasted 30 to 120 seconds. Associated symptoms include cough. Pertinent negatives include no confusion, no headaches, no speech difficulty, no visual disturbance, no sore throat, no nausea, no vomiting and no diarrhea. Characteristics include rhythmic jerking and loss of consciousness. The episode was Witnessed. There was No sensation of an aura present. The seizures Did not continue in the ED. The seizure(s) had no focality. Possible causes do not include sleep deprivation. There has been no fever.        Review of Systems   Constitutional:  Positive for fatigue. Negative for activity change, appetite change, chills, fever and unexpected weight change.   HENT:  Negative for congestion, ear pain, hearing loss, nosebleeds, postnasal drip, rhinorrhea, sinus pressure, sneezing, sore throat, tinnitus, trouble swallowing and voice change.    Eyes:  Negative for photophobia, pain, discharge, redness, itching and visual disturbance.   Respiratory:  Positive for cough. Negative for choking, chest tightness, shortness of breath and wheezing.    Cardiovascular:  Negative for leg swelling.   Gastrointestinal:  Positive for abdominal pain. Negative for abdominal distention, blood in stool, constipation, diarrhea, nausea and vomiting.   Endocrine: Negative for polydipsia and  "polyuria.   Genitourinary:  Negative for dysuria, flank pain, frequency, hematuria and urgency.   Musculoskeletal:  Positive for gait problem. Negative for arthralgias, back pain, joint swelling, neck pain and neck stiffness.   Skin:  Negative for rash and wound.   Allergic/Immunologic: Negative for immunocompromised state.   Neurological:  Positive for seizures and loss of consciousness. Negative for dizziness, syncope, facial asymmetry, speech difficulty, weakness, light-headedness, numbness and headaches.   Hematological:  Negative for adenopathy. Does not bruise/bleed easily.   Psychiatric/Behavioral:  Negative for agitation, behavioral problems, confusion, dysphoric mood, hallucinations, self-injury, sleep disturbance and suicidal ideas.        Objective   /70   Pulse 67   Temp 36.4 °C (97.5 °F) (Temporal)   Resp 16   Ht 1.549 m (5' 1\")   Wt 69.4 kg (153 lb 1.6 oz)   SpO2 97%   BMI 28.93 kg/m²     Physical Exam  Constitutional:       General: She is not in acute distress.     Appearance: She is not ill-appearing or diaphoretic.   HENT:      Head: Normocephalic and atraumatic.      Right Ear: External ear normal.      Left Ear: External ear normal.      Nose: Nose normal. No rhinorrhea.   Eyes:      General: Lids are normal. No scleral icterus.        Right eye: No discharge.         Left eye: No discharge.      Conjunctiva/sclera: Conjunctivae normal.   Cardiovascular:      Rate and Rhythm: Normal rate and regular rhythm.      Pulses: Normal pulses.      Heart sounds: No murmur heard.  Pulmonary:      Effort: Pulmonary effort is normal. No respiratory distress.      Breath sounds: No decreased breath sounds, wheezing, rhonchi or rales.   Abdominal:      General: Bowel sounds are normal. There is no distension.      Palpations: Abdomen is soft. There is no mass.      Tenderness: There is no abdominal tenderness. There is no guarding or rebound.   Musculoskeletal:         General: No swelling, " tenderness or deformity.      Cervical back: No rigidity or tenderness.      Right lower leg: No edema.      Left lower leg: No edema.   Lymphadenopathy:      Cervical: No cervical adenopathy.      Upper Body:      Right upper body: No supraclavicular adenopathy.      Left upper body: No supraclavicular adenopathy.   Skin:     General: Skin is warm and dry.      Coloration: Skin is not jaundiced or pale.      Findings: No erythema, lesion or rash.   Neurological:      General: No focal deficit present.      Mental Status: She is alert and oriented to person, place, and time.      Sensory: No sensory deficit.      Motor: No weakness or tremor.      Coordination: Coordination abnormal.      Gait: Gait abnormal.   Psychiatric:         Mood and Affect: Mood normal. Affect is not inappropriate.         Behavior: Behavior normal.         Assessment/Plan   Diagnoses and all orders for this visit:  Acquired hypothyroidism  -     Follow Up In [facility] - PCP - Medicare Annual; Future  -     Comprehensive Metabolic Panel; Future  -     Lipid Panel; Future  -     TSH with reflex to Free T4 if abnormal; Future  Schizoaffective disorder, depressive type (Multi)  -     Follow Up In [facility] - PCP - Medicare Annual; Future  Moderate mixed hyperlipidemia not requiring statin therapy  -     Follow Up In [facility] - PCP - Medicare Annual; Future  -     Comprehensive Metabolic Panel; Future  -     Lipid Panel; Future  -     TSH with reflex to Free T4 if abnormal; Future  Vitamin D deficiency  -     Follow Up In [facility] - PCP - Medicare Annual; Future  -     Vitamin D 25-Hydroxy,Total (for eval of Vitamin D levels); Future  Overweight with body mass index (BMI) of 28 to 28.9 in adult  -     Follow Up In [facility] - PCP - Medicare Annual; Future  -     CBC and Auto Differential; Future  -     Comprehensive Metabolic Panel; Future  -     Lipid Panel; Future  -     Magnesium;  Future  -     TSH with reflex to Free T4 if abnormal; Future  -     Vitamin D 25-Hydroxy,Total (for eval of Vitamin D levels); Future  Allergic rhinitis, unspecified seasonality, unspecified trigger  -     famotidine (Pepcid) 40 mg tablet; Take 1 tablet (40 mg) by mouth once daily at bedtime.  Gastroesophageal reflux disease, unspecified whether esophagitis present  -     famotidine (Pepcid) 40 mg tablet; Take 1 tablet (40 mg) by mouth once daily at bedtime.

## 2025-04-15 ENCOUNTER — HOSPITAL ENCOUNTER (OUTPATIENT)
Dept: RADIOLOGY | Facility: HOSPITAL | Age: 60
Discharge: HOME | End: 2025-04-15
Payer: MEDICARE

## 2025-04-15 VITALS — WEIGHT: 153 LBS | BODY MASS INDEX: 28.91 KG/M2

## 2025-04-15 DIAGNOSIS — Z12.31 ENCOUNTER FOR SCREENING MAMMOGRAM FOR BREAST CANCER: ICD-10-CM

## 2025-04-15 PROCEDURE — 77067 SCR MAMMO BI INCL CAD: CPT | Performed by: RADIOLOGY

## 2025-04-15 PROCEDURE — 77063 BREAST TOMOSYNTHESIS BI: CPT | Performed by: RADIOLOGY

## 2025-04-15 PROCEDURE — 77067 SCR MAMMO BI INCL CAD: CPT

## 2025-04-17 ENCOUNTER — HOSPITAL ENCOUNTER (OUTPATIENT)
Dept: RADIOLOGY | Facility: EXTERNAL LOCATION | Age: 60
Discharge: HOME | End: 2025-04-17

## 2025-04-28 DIAGNOSIS — R32 URINARY INCONTINENCE, UNSPECIFIED TYPE: ICD-10-CM

## 2025-05-04 LAB
APPEARANCE UR: CLEAR
BACTERIA UR CULT: NORMAL
BILIRUB UR QL STRIP: NEGATIVE
COLOR UR: YELLOW
GLUCOSE UR QL STRIP: NEGATIVE
HGB UR QL STRIP: NEGATIVE
KETONES UR QL STRIP: NEGATIVE
LEUKOCYTE ESTERASE UR QL STRIP: NEGATIVE
NITRITE UR QL STRIP: NEGATIVE
PH UR STRIP: 6 [PH] (ref 5–8)
PROT UR QL STRIP: NEGATIVE
SP GR UR STRIP: 1.02 (ref 1–1.03)

## 2025-05-19 DIAGNOSIS — F51.01 PRIMARY INSOMNIA: Primary | ICD-10-CM

## 2025-05-19 RX ORDER — MELATONIN 10 MG
TABLET, SUBLINGUAL SUBLINGUAL
Qty: 60 TABLET | Refills: 10 | Status: SHIPPED | OUTPATIENT
Start: 2025-05-19

## 2025-05-21 ENCOUNTER — PATIENT MESSAGE (OUTPATIENT)
Dept: PRIMARY CARE | Facility: CLINIC | Age: 60
End: 2025-05-21
Payer: MEDICARE

## 2025-05-21 DIAGNOSIS — F71 MODERATE INTELLECTUAL DISABILITIES: ICD-10-CM

## 2025-05-21 DIAGNOSIS — R39.81 FUNCTIONAL URINARY INCONTINENCE: Primary | ICD-10-CM

## 2025-05-22 RX ORDER — DIAPER,BRIEF,ADULT, DISPOSABLE
EACH MISCELLANEOUS
Qty: 90 EACH | Refills: 11 | Status: SHIPPED | OUTPATIENT
Start: 2025-05-22

## 2025-06-09 ENCOUNTER — APPOINTMENT (OUTPATIENT)
Dept: PRIMARY CARE | Facility: CLINIC | Age: 60
End: 2025-06-09
Payer: MEDICARE

## 2025-06-09 VITALS
DIASTOLIC BLOOD PRESSURE: 61 MMHG | TEMPERATURE: 97.6 F | SYSTOLIC BLOOD PRESSURE: 106 MMHG | HEART RATE: 65 BPM | OXYGEN SATURATION: 96 % | RESPIRATION RATE: 16 BRPM

## 2025-06-09 DIAGNOSIS — D53.9 ANEMIA, MACROCYTIC: ICD-10-CM

## 2025-06-09 DIAGNOSIS — E55.9 VITAMIN D DEFICIENCY: ICD-10-CM

## 2025-06-09 DIAGNOSIS — E03.9 ACQUIRED HYPOTHYROIDISM: ICD-10-CM

## 2025-06-09 DIAGNOSIS — E78.2 MODERATE MIXED HYPERLIPIDEMIA NOT REQUIRING STATIN THERAPY: ICD-10-CM

## 2025-06-09 DIAGNOSIS — Z71.89 ADVANCED DIRECTIVES, COUNSELING/DISCUSSION: ICD-10-CM

## 2025-06-09 DIAGNOSIS — Z00.00 ROUTINE GENERAL MEDICAL EXAMINATION AT HEALTH CARE FACILITY: Primary | ICD-10-CM

## 2025-06-09 PROBLEM — E66.3 OVERWEIGHT WITH BODY MASS INDEX (BMI) OF 28 TO 28.9 IN ADULT: Status: RESOLVED | Noted: 2024-01-24 | Resolved: 2025-06-09

## 2025-06-09 PROCEDURE — 96372 THER/PROPH/DIAG INJ SC/IM: CPT | Performed by: FAMILY MEDICINE

## 2025-06-09 PROCEDURE — G0439 PPPS, SUBSEQ VISIT: HCPCS | Performed by: FAMILY MEDICINE

## 2025-06-09 PROCEDURE — 99497 ADVNCD CARE PLAN 30 MIN: CPT | Performed by: FAMILY MEDICINE

## 2025-06-09 PROCEDURE — 1036F TOBACCO NON-USER: CPT | Performed by: FAMILY MEDICINE

## 2025-06-09 PROCEDURE — 99214 OFFICE O/P EST MOD 30 MIN: CPT | Performed by: FAMILY MEDICINE

## 2025-06-09 RX ORDER — CYANOCOBALAMIN 1000 UG/ML
1000 INJECTION, SOLUTION INTRAMUSCULAR; SUBCUTANEOUS ONCE
Status: COMPLETED | OUTPATIENT
Start: 2025-06-09 | End: 2025-06-09

## 2025-06-09 RX ADMIN — CYANOCOBALAMIN 1000 MCG: 1000 INJECTION, SOLUTION INTRAMUSCULAR; SUBCUTANEOUS at 10:15

## 2025-06-09 ASSESSMENT — ENCOUNTER SYMPTOMS
CONFUSION: 0
COUGH: 0
SINUS PRESSURE: 0
DYSURIA: 0
BRUISES/BLEEDS EASILY: 0
NERVOUS/ANXIOUS: 0
SPEECH DIFFICULTY: 0
RHINORRHEA: 0
ACTIVITY CHANGE: 0
HEMATURIA: 0
NECK PAIN: 0
EYE DISCHARGE: 0
OCCASIONAL FEELINGS OF UNSTEADINESS: 1
LOSS OF SENSATION IN FEET: 0
LIGHT-HEADEDNESS: 0
FREQUENCY: 0
ADENOPATHY: 0
TREMORS: 0
FLANK PAIN: 0
NECK STIFFNESS: 0
SLEEP DISTURBANCE: 0
FEVER: 0
WHEEZING: 0
CHOKING: 0
NAUSEA: 0
WOUND: 0
ARTHRALGIAS: 0
APPETITE CHANGE: 0
SHORTNESS OF BREATH: 0
BACK PAIN: 0
EYE REDNESS: 0
CHEST TIGHTNESS: 0
DEPRESSION: 0
JOINT SWELLING: 0
TROUBLE SWALLOWING: 0
CONSTIPATION: 0
PHOTOPHOBIA: 0
DIZZINESS: 0
DIARRHEA: 0
ABDOMINAL PAIN: 0
VOMITING: 0
BLOOD IN STOOL: 0
AGITATION: 0
UNEXPECTED WEIGHT CHANGE: 0
SEIZURES: 0
EYE PAIN: 0
ABDOMINAL DISTENTION: 0
SORE THROAT: 0
DIAPHORESIS: 0
PALPITATIONS: 0
DYSPHORIC MOOD: 0
EYE ITCHING: 0
HALLUCINATIONS: 0
VOICE CHANGE: 0
MYALGIAS: 0
WEAKNESS: 0
FACIAL ASYMMETRY: 0
CHILLS: 0
POLYDIPSIA: 0
HEADACHES: 0
FATIGUE: 1
NUMBNESS: 0

## 2025-06-09 ASSESSMENT — ACTIVITIES OF DAILY LIVING (ADL)
MANAGING_FINANCES: TOTAL CARE
DRESSING: NEEDS ASSISTANCE
GROCERY_SHOPPING: TOTAL CARE
DOING_HOUSEWORK: TOTAL CARE
TAKING_MEDICATION: TOTAL CARE
BATHING: NEEDS ASSISTANCE

## 2025-06-09 NOTE — PROGRESS NOTES
Subjective   Patient ID: Marci Mendiola is a 59 y.o. female who presents for Medicare Annual Wellness Visit Subsequent (Pt here today for a Medicare Annual Wellness Visit and discuss lab work ).    Thyroid Problem  Presents for follow-up visit. Symptoms include fatigue. Patient reports no anxiety, cold intolerance, constipation, diaphoresis, diarrhea, heat intolerance, palpitations or tremors. The symptoms have been stable.   Hyperlipidemia  This is a chronic problem. The current episode started more than 1 year ago. The problem is controlled. Recent lipid tests were reviewed and are normal. Exacerbating diseases include hypothyroidism and obesity. Factors aggravating her hyperlipidemia include atypical antipsychotics. Pertinent negatives include no chest pain, myalgias or shortness of breath. She is currently on no antihyperlipidemic treatment. The current treatment provides no improvement of lipids. Compliance problems include adherence to diet.  Risk factors for coronary artery disease include dyslipidemia, obesity, a sedentary lifestyle and post-menopausal.        Review of Systems   Constitutional:  Positive for fatigue. Negative for activity change, appetite change, chills, diaphoresis, fever and unexpected weight change.   HENT:  Negative for congestion, ear pain, hearing loss, nosebleeds, postnasal drip, rhinorrhea, sinus pressure, sneezing, sore throat, tinnitus, trouble swallowing and voice change.    Eyes:  Negative for photophobia, pain, discharge, redness, itching and visual disturbance.   Respiratory:  Negative for cough, choking, chest tightness, shortness of breath and wheezing.    Cardiovascular:  Negative for chest pain, palpitations and leg swelling.   Gastrointestinal:  Negative for abdominal distention, abdominal pain, blood in stool, constipation, diarrhea, nausea and vomiting.   Endocrine: Negative for cold intolerance, heat intolerance, polydipsia and polyuria.   Genitourinary:  Negative for  dysuria, flank pain, frequency, hematuria and urgency.   Musculoskeletal:  Positive for gait problem. Negative for arthralgias, back pain, joint swelling, myalgias, neck pain and neck stiffness.   Skin:  Negative for rash and wound.   Allergic/Immunologic: Negative for immunocompromised state.   Neurological:  Negative for dizziness, tremors, seizures, syncope, facial asymmetry, speech difficulty, weakness, light-headedness, numbness and headaches.   Hematological:  Negative for adenopathy. Does not bruise/bleed easily.   Psychiatric/Behavioral:  Negative for agitation, behavioral problems, confusion, dysphoric mood, hallucinations, self-injury, sleep disturbance and suicidal ideas. The patient is not nervous/anxious.        Objective   /61   Pulse 65   Temp 36.4 °C (97.6 °F)   Resp 16   SpO2 96%     Physical Exam  Constitutional:       General: She is not in acute distress.     Appearance: She is not ill-appearing or diaphoretic.   HENT:      Head: Normocephalic and atraumatic.      Right Ear: External ear normal.      Left Ear: External ear normal.      Nose: Nose normal. No rhinorrhea.   Eyes:      General: Lids are normal. No scleral icterus.        Right eye: No discharge.         Left eye: No discharge.      Conjunctiva/sclera: Conjunctivae normal.   Cardiovascular:      Rate and Rhythm: Normal rate and regular rhythm.      Pulses: Normal pulses.      Heart sounds: No murmur heard.  Pulmonary:      Effort: Pulmonary effort is normal. No respiratory distress.      Breath sounds: No decreased breath sounds, wheezing, rhonchi or rales.   Abdominal:      General: Bowel sounds are normal. There is no distension.      Palpations: Abdomen is soft. There is no mass.      Tenderness: There is no abdominal tenderness. There is no guarding or rebound.   Musculoskeletal:         General: No swelling, tenderness or deformity.      Cervical back: No rigidity or tenderness.      Right lower leg: No edema.      Left  lower leg: No edema.   Lymphadenopathy:      Cervical: No cervical adenopathy.      Upper Body:      Right upper body: No supraclavicular adenopathy.      Left upper body: No supraclavicular adenopathy.   Skin:     General: Skin is warm and dry.      Coloration: Skin is not jaundiced or pale.      Findings: No erythema, lesion or rash.   Neurological:      General: No focal deficit present.      Mental Status: She is alert and oriented to person, place, and time.      Sensory: No sensory deficit.      Motor: No weakness or tremor.      Coordination: Coordination abnormal.      Gait: Gait abnormal.   Psychiatric:         Mood and Affect: Mood normal. Affect is not inappropriate.         Behavior: Behavior normal.         Assessment/Plan   Diagnoses and all orders for this visit:  Routine general medical examination at health care facility  -     Follow Up In [facility]; Future  Acquired hypothyroidism  -     Follow Up In [facility] - PCP - Medicare Annual  -     Comprehensive Metabolic Panel; Future  -     Lipid Panel; Future  -     TSH with reflex to Free T4 if abnormal; Future  -     Follow Up In [facility]; Future  Moderate mixed hyperlipidemia not requiring statin therapy  -     Follow Up In [facility] - PCP - Medicare Annual  -     Comprehensive Metabolic Panel; Future  -     Lipid Panel; Future  -     TSH with reflex to Free T4 if abnormal; Future  -     Follow Up In [facility] - Established; Future  Advanced directives, counseling/discussion  -     Full code  -     Follow Up In [facility]; Future  Anemia, macrocytic  -     cyanocobalamin (Vitamin B-12) injection 1,000 mcg  -     Folate; Future  -     Vitamin B12; Future  -     CBC and Auto Differential; Future  -     Follow Up In [facility]; Future  Vitamin D deficiency  -     Comprehensive Metabolic  Panel; Future  -     Magnesium; Future  -     Follow Up In [facility] - PCP - Established; Future  -     Vitamin D 25-Hydroxy,Total (for eval of Vitamin D levels); Future    Advance care planning was discussed including living will, power of  for healthcare and living will.  Advance care planning packet will be provided to patient at discharge.  Patient was encouraged to bring in any advanced care planning paperwork to file on the chart at their own convenience.  (16min spent discussing above)  Patient was identified as a fall risk. Risk prevention instructions provided.

## 2025-06-17 DIAGNOSIS — E03.9 ACQUIRED HYPOTHYROIDISM: ICD-10-CM

## 2025-06-17 DIAGNOSIS — E55.9 VITAMIN D DEFICIENCY: ICD-10-CM

## 2025-06-17 DIAGNOSIS — D64.9 ANEMIA, UNSPECIFIED TYPE: ICD-10-CM

## 2025-06-17 DIAGNOSIS — S42.212D CLOSED FRACTURE OF NECK OF LEFT HUMERUS WITH ROUTINE HEALING: ICD-10-CM

## 2025-06-17 RX ORDER — ACETAMINOPHEN 500 MG
TABLET ORAL DAILY
Qty: 30 CAPSULE | Refills: 10 | Status: SHIPPED | OUTPATIENT
Start: 2025-06-17

## 2025-06-17 RX ORDER — LEVOTHYROXINE SODIUM 100 UG/1
TABLET ORAL
Qty: 30 TABLET | Refills: 4 | Status: SHIPPED | OUTPATIENT
Start: 2025-06-17

## 2025-06-17 RX ORDER — FERROUS SULFATE 325(65) MG
TABLET ORAL
Qty: 30 TABLET | Refills: 10 | Status: SHIPPED | OUTPATIENT
Start: 2025-06-17

## 2025-06-17 RX ORDER — CALCIUM CARBONATE 500(1250)
1 TABLET ORAL DAILY
Qty: 30 TABLET | Refills: 2 | Status: SHIPPED | OUTPATIENT
Start: 2025-06-17

## 2025-12-09 ENCOUNTER — APPOINTMENT (OUTPATIENT)
Dept: PRIMARY CARE | Facility: CLINIC | Age: 60
End: 2025-12-09
Payer: MEDICARE

## (undated) DEVICE — TUBING, SUCTION, 1/4" X 10', STRAIGHT: Brand: MEDLINE

## (undated) DEVICE — BRUSH ENDO CLN L90.5IN SHTH DIA1.7MM BRIST DIA5-7MM 2-6MM

## (undated) DEVICE — ENDO CARRY-ON PROCEDURE KIT: Brand: ENDO CARRY-ON PROCEDURE KIT

## (undated) DEVICE — SINGLE PORT MANIFOLD: Brand: NEPTUNE 2

## (undated) DEVICE — TUBE SET 96 MM 64 MM H2O PERISTALTIC STD AUX CHANNEL

## (undated) DEVICE — Device: Brand: ENDO SMARTCAP

## (undated) DEVICE — GLOVE ORANGE PI 8 1/2   MSG9085